# Patient Record
Sex: FEMALE | Race: WHITE | NOT HISPANIC OR LATINO | Employment: OTHER | URBAN - NONMETROPOLITAN AREA
[De-identification: names, ages, dates, MRNs, and addresses within clinical notes are randomized per-mention and may not be internally consistent; named-entity substitution may affect disease eponyms.]

---

## 2017-01-03 ENCOUNTER — TRANSCRIBE ORDERS (OUTPATIENT)
Dept: ADMINISTRATIVE | Facility: HOSPITAL | Age: 67
End: 2017-01-03

## 2017-01-03 DIAGNOSIS — Z12.31 SCREENING MAMMOGRAM, ENCOUNTER FOR: Primary | ICD-10-CM

## 2017-01-03 DIAGNOSIS — Z12.39 ENCOUNTER FOR SCREENING FOR MALIGNANT NEOPLASM OF BREAST: Primary | ICD-10-CM

## 2017-01-04 ENCOUNTER — HOSPITAL ENCOUNTER (OUTPATIENT)
Dept: MAMMOGRAPHY | Facility: HOSPITAL | Age: 67
Discharge: HOME | End: 2017-01-04
Admitting: NURSE PRACTITIONER

## 2017-01-04 DIAGNOSIS — Z12.39 ENCOUNTER FOR SCREENING FOR MALIGNANT NEOPLASM OF BREAST: ICD-10-CM

## 2017-01-04 PROCEDURE — G0202 SCR MAMMO BI INCL CAD: HCPCS

## 2017-01-04 PROCEDURE — 77063 BREAST TOMOSYNTHESIS BI: CPT

## 2017-01-10 ENCOUNTER — TRANSCRIBE ORDERS (OUTPATIENT)
Dept: ADMINISTRATIVE | Facility: HOSPITAL | Age: 67
End: 2017-01-10

## 2017-01-10 DIAGNOSIS — N64.89 BREAST ASYMMETRY: Primary | ICD-10-CM

## 2017-01-12 ENCOUNTER — HOSPITAL ENCOUNTER (OUTPATIENT)
Dept: MAMMOGRAPHY | Facility: HOSPITAL | Age: 67
Discharge: HOME | End: 2017-01-12
Admitting: NURSE PRACTITIONER

## 2017-01-12 ENCOUNTER — HOSPITAL ENCOUNTER (OUTPATIENT)
Dept: ULTRASOUND IMAGING | Facility: HOSPITAL | Age: 67
Discharge: HOME | End: 2017-01-12

## 2017-01-12 DIAGNOSIS — N64.89 BREAST ASYMMETRY: ICD-10-CM

## 2017-01-12 DIAGNOSIS — R92.2 BREAST DENSITY: Primary | ICD-10-CM

## 2017-01-12 PROCEDURE — 76642 ULTRASOUND BREAST LIMITED: CPT

## 2017-01-12 PROCEDURE — G0204 DX MAMMO INCL CAD BI: HCPCS

## 2017-01-12 PROCEDURE — G0279 TOMOSYNTHESIS, MAMMO: HCPCS

## 2017-06-07 ENCOUNTER — OFFICE VISIT (OUTPATIENT)
Dept: CARDIOLOGY | Age: 67
End: 2017-06-07
Payer: COMMERCIAL

## 2017-06-07 VITALS
SYSTOLIC BLOOD PRESSURE: 130 MMHG | HEIGHT: 62 IN | BODY MASS INDEX: 25.1 KG/M2 | DIASTOLIC BLOOD PRESSURE: 80 MMHG | WEIGHT: 136.4 LBS | HEART RATE: 77 BPM

## 2017-06-07 DIAGNOSIS — I10 ESSENTIAL HYPERTENSION: Primary | ICD-10-CM

## 2017-06-07 PROCEDURE — 99204 OFFICE O/P NEW MOD 45 MIN: CPT | Performed by: INTERNAL MEDICINE

## 2017-06-07 PROCEDURE — 93000 ELECTROCARDIOGRAM COMPLETE: CPT | Performed by: INTERNAL MEDICINE

## 2017-06-07 ASSESSMENT — ENCOUNTER SYMPTOMS: SHORTNESS OF BREATH: 0

## 2017-07-17 ENCOUNTER — TRANSCRIBE ORDERS (OUTPATIENT)
Dept: PHYSICAL THERAPY | Facility: HOSPITAL | Age: 67
End: 2017-07-17

## 2017-07-17 ENCOUNTER — HOSPITAL ENCOUNTER (OUTPATIENT)
Dept: PHYSICAL THERAPY | Facility: HOSPITAL | Age: 67
Setting detail: THERAPIES SERIES
Discharge: HOME | End: 2017-07-17

## 2017-07-17 DIAGNOSIS — Z96.651 STATUS POST TOTAL RIGHT KNEE REPLACEMENT: Primary | ICD-10-CM

## 2017-07-17 PROCEDURE — 97161 PT EVAL LOW COMPLEX 20 MIN: CPT

## 2017-07-17 NOTE — THERAPY EVALUATION
Outpatient Physical Therapy Ortho Initial Evaluation   [facility]     Patient Name: Gayatri Bianchi  : 1950  MRN: [id number]  Today's Date: 2017      Visit Date: 2017    There is no problem list on file for this patient.       History reviewed. No pertinent past medical history.     Past Surgical History:   Procedure Laterality Date   • BREAST BIOPSY     • HYSTERECTOMY     • OOPHORECTOMY Right        Visit Dx:     ICD-10-CM ICD-9-CM   1. Status post total right knee replacement Z96.651 V43.65             Patient History       17 0900          History    Chief Complaint Pain;Muscle weakness  -RS      Type of Pain Knee pain  -RS      Date Current Problem(s) Began 17  -RS      Brief Description of Current Complaint Patient had a (R) TKA on the date listed above.  She was admitted for one night at the hospital.  Patient is currently using the axillary crutches and negotiating stairs with no difficulties.  Minor drainge was noted from the wound two days ago but this has stopped.  She is changing her own dressings and working on the prescribed HEP.  No falls since surgery.  -RS      Previous treatment for THIS PROBLEM Surgery;Medication  -RS      Onset Date- PT 2017  -RS      Surgery Date: 17  -RS      Patient/Caregiver Goals Relieve pain;Return to prior level of function;Improve mobility;Improve strength  -RS      Current Tobacco Use no  -RS      Smoking Status never smoker  -RS      Patient's Rating of General Health Excellent  -RS      Hand Dominance right-handed  -RS      Occupation/sports/leisure activities retired physical therapist  -RS      Related/Recent Hospitalizations Yes  -RS      Date of Hospitalization 17  -RS      Surgery/Hospitalization (R) TKA  -RS      Are you or can you be pregnant No  -RS      Location (Hospital Name) [facility]  -RS      Pain     Pain Location Knee  -RS      Pain at Present 4  -RS      Pain at Best 0  -RS      Pain at Worst 10  -RS       Pain Frequency Constant/continuous  -RS      Pain Description Aching;Sharp;Tightness;Tender;Throbbing;Sore  -RS      What Performance Factors Make the Current Problem(s) WORSE? active SLR; twisting; bending  -RS      What Performance Factors Make the Current Problem(s) BETTER? propping knee with ice  -RS      Pain Comments 10/10 pain with straightening the knee (working on extension)  -RS      Fall Risk Assessment    Any falls in the past year: No  -RS      Services    Prior Rehab/Home Health Experiences Yes  -RS      Do you plan to receive Home Health services in the near future No  -RS      Daily Activities    Primary Language English  -RS      Are you able to read Yes  -RS      Are you able to write Yes  -RS      How does patient learn best? Listening;Pictures/Video;Demonstration  -RS      Teaching needs identified Home Exercise Program  -RS      Pt Participated in POC and Goals Yes  -RS      Safety    Are you being hurt, hit, or frightened by anyone at home or in your life? No  -RS      Are you being neglected by a caregiver No  -RS        User Key  (r) = Recorded By, (t) = Taken By, (c) = Cosigned By    Initials Name Provider Type    RS Amadou Hunt, PT DPT Physical Therapist                PT Ortho       07/17/17 0930    Posture/Observations    Posture- WNL Posture is WNL  -RS    Observations Incision healing  -RS    Posture/Observations Comments No drainage noted from the incision.  The distal 1/3 of the incision is slightly adhered but I was not aggressive due to recent surgery.  -RS    Sensation    Sensation WNL? WNL  -RS    Light Touch No apparent deficits  -RS    Special Tests/Palpation    Special Tests/Palpation Knee  -RS    Knee Palpation    Medial Joint Line Right:;Tender;Swollen  -RS    Lateral Joint Line Right:;Tender;Swollen;Warm  -RS    Posterior Joint Line Right:;Tender;Swollen  -RS    Quads Right:;Tender;Atrophied  -RS    Medial Gastroc Head Right:;Tender;Swollen;Guarded/taut  -RS     Lateral Gastroc Head Right:;Tender;Guarded/taut;Swollen  -RS    Knee Palpation? Yes  -RS    Patellar Accessory Motions    Patellar Accessory Motions Tested? Yes  -RS    Medial glide Right:;WNL  -RS    Lateral glide Right:;WNL  -RS    Knee (Tibiofemoral) Accessory Motions    Knee (Tibiofemoral) Accessory Motions Tested? --   not assessed due to recent surgery  -RS    ROM (Range of Motion)    General ROM lower extremity range of motion deficits identified  -RS    General LE Assessment    ROM knee, right: LE ROM deficit  -RS    ROM Detail (R) extension lag with SLR:  13 degrees with pain noted  -RS    Right Knee    Extension/Flexion AROM Deficit Extension:  +3 (WNL); Flexion:  65 degrees  -RS    MMT (Manual Muscle Testing)    General MMT Assessment lower extremity strength deficits identified  -RS    Lower Extremity    Lower Ext Manual Muscle Testing right knee strength deficit  -RS    Right Knee    Knee Manual Muscle Testing Detail Quad tone was moderately decreased with quad set.  Formal MMT unable to be assessed due to pain/swelling  -RS    Girth    Girth Measured? Right Lower Extremity;Left Lower Extremity  -RS    RLE Quick Girth (cm)    Mid patella 48.3 cm  -RS    LLE Quick Girth (cm)    Mid patella 34.6 cm  -RS    Pathomechanics    Lower Extremity Pathomechanics Antalgic with midstance;Asymmetrical steps  -RS    Pathomechanics Comments avoidance pattern of weight bearing on the (R) LE with midstance that was present prior to surgery.  -RS    Balance Skills Training    SLS (R) unable without UE support  -RS    Gait Assessment/Treatment    Gait, Assistive Device axillary crutches  -RS    Gait, Gait Pattern Analysis swing-through gait  -RS    Gait, Gait Deviations right:;ataxia;leans to the left;step length decreased;weight-shifting ability decreased  -RS    Gait, Impairments strength decreased;ROM decreased;impaired balance;pain  -RS    Gait, Comment Patient does lose 6 degrees of knee extension with midstance with  a CW pelvic rotation (habitual prior to surgery)  -RS    Stairs Assessment/Treatment    Stairs, Comment not assessed due to patient reports of this not being an issue at home.  Patient can negotitate 14 stairs at home without assistance.     -RS      User Key  (r) = Recorded By, (t) = Taken By, (c) = Cosigned By    Initials Name Provider Type    RS Amadou Hunt, PT DPT Physical Therapist                            Therapy Education       07/17/17 0930          Therapy Education    Given HEP;Symptoms/condition management;Mobility training  -RS      Program Modified   eccentric SLR, isometric SAQ vs. ASLR and SAQ OKC  -RS      How Provided Verbal;Demonstration  -RS      Provided to Patient  -RS      Level of Understanding Demonstrated;Verbalized  -RS        User Key  (r) = Recorded By, (t) = Taken By, (c) = Cosigned By    Initials Name Provider Type    RS Amadou Hunt, PT DPT Physical Therapist                PT OP Goals       07/17/17 0900       PT Short Term Goals    STG Date to Achieve 08/07/17  -RS     STG 1 Patient will improve active (R) knee flexion to 90+ degrees  -RS     STG 1 Progress New  -RS     STG 2 Patient will decrease (R) knee swelling, measured at the mid patella, by 2.0 cm at least  -RS     STG 2 Progress New  -RS     STG 3 Patient will decrease (R) knee extension lag with active SLR to <5 degrees  -RS     STG 3 Progress New  -RS     Long Term Goals    LTG Date to Achieve 08/28/17  -RS     LTG 1 Patient will be independent with a comprehensive HEP by discharge.    -RS     LTG 1 Progress New  -RS     LTG 2 Patient will ambulate with equal weight bearing using no assistive device, demonstrating no compensatory flexion, by discharge.  -RS     LTG 2 Progress New  -RS     LTG 3 Patient will demonstrate (R) active knee flexion to 120 degrees plus by discharge.  -RS     LTG 3 Progress New  -RS     LTG 4 Patient will demonstrate no compensatory weight shift with functional activites such  as sit to stand, stand to sit, squatting, etc by discharge  -RS     LTG 4 Progress New  -RS     LTG 5 Patient will report symptoms <2/10 with ADL's and recreational activities (other than kayaking) including ambulating community distances by discharge.  -RS     LTG 5 Progress New  -RS     Time Calculation    PT Goal Re-Cert Due Date 08/16/17  -RS       User Key  (r) = Recorded By, (t) = Taken By, (c) = Cosigned By    Initials Name Provider Type    RS Amadou Hunt, PT DPT Physical Therapist                PT Assessment/Plan       07/17/17 0930       PT Assessment    Functional Limitations Impaired gait;Performance in self-care ADL;Performance in leisure activities  -RS     Impairments Balance;Gait;Muscle strength;Pain;Range of motion;Edema  -RS     Assessment Comments Patient presents s/p (R) TKA on 07/13/2017.  Overall she is doing very well at this time.  The patient is using axillary crutches independently with no buckling of the (R) knee noted during midstance.  The patient has habitually shifted to the left LE prior to surgery that we need to address, including a clockwise pelvic rotation.  Extension ROM is excellent at this time and we will monitor this over time to ensure we don't lose any motion.  Flexion AROM is limited by pain but this is early and this will also improve over time.  Scar mobility will be a focus once the incision is fully healed.  The swelling has reflexively inhibited the quads leading to the extension lag with active SLR.  Patient was given a size F Tg  (with gauze to cover the incision) for the swelling and instructed on activity modifications to avoid excessive swelling.  HEP was modified to avoid painful SLR with an eccentric focus.  Modalities may be used to improve quadriceps control and decrease pain if needed.  I anticipate that the patient will do very well.  Thank you for allowing us to work with this patient.  -RS     Please refer to paper survey for additional  self-reported information Yes  -RS     Rehab Potential Excellent  -RS     Patient/caregiver participated in establishment of treatment plan and goals Yes  -RS     Patient would benefit from skilled therapy intervention Yes  -RS     PT Plan    PT Frequency 3x/week  -RS     Predicted Duration of Therapy Intervention (days/wks) 4-6 weeks  -RS     Planned CPT's? PT EVAL LOW COMPLEXITY: 76835;PT THER PROC EA 15 MIN: 85079;PT MANUAL THERAPY EA 15 MIN: 43670;PT NEUROMUSC RE-EDUCATION EA 15 MIN: 67658;PT GAIT TRAINING EA 15 MIN: 99417;PT HOT OR COLD PACK TREAT MCARE;PT ELECTRICAL STIM UNATTEND: ;PT ELECTRICAL STIM ATTD EA 15 MIN: 62130  -RS     Physical Therapy Interventions (Optional Details) balance training;gait training;home exercise program;joint mobilization;manual therapy techniques;modalities;neuromuscular re-education;patient/family education;ROM (Range of Motion);strengthening;stretching;swiss ball techniques;taping;transfer training  -RS     PT Plan Comments We will begin with focusing on decreasing the swelling, gradually restoring flexion ROM, and improving volitonal quadriceps control.  The muscular control will improve as the swelling/effusion goes down.  The patient will be progressed on a comprehensive HEP.  -RS       User Key  (r) = Recorded By, (t) = Taken By, (c) = Cosigned By    Initials Name Provider Type    RS Amadou Hunt, PT DPT Physical Therapist                                    Time Calculation:   Start Time: 0920  Stop Time: 1020  Time Calculation (min): 60 min     Therapy Charges for Today     Code Description Service Date Service Provider Modifiers Qty    [id number]  PT EVAL LOW COMPLEXITY 4 7/17/2017 Amadou Hunt, PT DPT GP 1                    KIM Hunt, PT DPT  7/17/2017

## 2017-07-18 ENCOUNTER — HOSPITAL ENCOUNTER (OUTPATIENT)
Dept: ULTRASOUND IMAGING | Facility: HOSPITAL | Age: 67
Discharge: HOME | End: 2017-07-18
Admitting: ORTHOPAEDIC SURGERY

## 2017-07-18 ENCOUNTER — TRANSCRIBE ORDERS (OUTPATIENT)
Dept: ADMINISTRATIVE | Facility: HOSPITAL | Age: 67
End: 2017-07-18

## 2017-07-18 DIAGNOSIS — M79.89 LEG SWELLING: ICD-10-CM

## 2017-07-18 DIAGNOSIS — M79.89 LEG SWELLING: Primary | ICD-10-CM

## 2017-07-18 PROCEDURE — 93971 EXTREMITY STUDY: CPT

## 2017-07-19 ENCOUNTER — HOSPITAL ENCOUNTER (OUTPATIENT)
Dept: PHYSICAL THERAPY | Facility: HOSPITAL | Age: 67
Setting detail: THERAPIES SERIES
Discharge: HOME | End: 2017-07-19

## 2017-07-19 DIAGNOSIS — Z96.651 STATUS POST TOTAL RIGHT KNEE REPLACEMENT: Primary | ICD-10-CM

## 2017-07-19 PROCEDURE — 97110 THERAPEUTIC EXERCISES: CPT

## 2017-07-19 NOTE — THERAPY TREATMENT NOTE
Outpatient Physical Therapy Ortho Treatment Note  [facility]     Patient Name: Gayatri Bianchi  : 1950  MRN: [id number]  Today's Date: 2017      Visit Date: 2017    Visit Dx:    ICD-10-CM ICD-9-CM   1. Status post total right knee replacement Z96.651 V43.65       There is no problem list on file for this patient.       No past medical history on file.     Past Surgical History:   Procedure Laterality Date   • BREAST BIOPSY     • HYSTERECTOMY     • OOPHORECTOMY Right              PT Ortho       17 0930    Posture/Observations    Posture- WNL Posture is WNL  -RS    Observations Incision healing  -RS    Posture/Observations Comments No drainage noted from the incision.  The distal 1/3 of the incision is slightly adhered but I was not aggressive due to recent surgery.  -RS    Sensation    Sensation WNL? WNL  -RS    Light Touch No apparent deficits  -RS    Special Tests/Palpation    Special Tests/Palpation Knee  -RS    Knee Palpation    Medial Joint Line Right:;Tender;Swollen  -RS    Lateral Joint Line Right:;Tender;Swollen;Warm  -RS    Posterior Joint Line Right:;Tender;Swollen  -RS    Quads Right:;Tender;Atrophied  -RS    Medial Gastroc Head Right:;Tender;Swollen;Guarded/taut  -RS    Lateral Gastroc Head Right:;Tender;Guarded/taut;Swollen  -RS    Knee Palpation? Yes  -RS    Patellar Accessory Motions    Patellar Accessory Motions Tested? Yes  -RS    Medial glide Right:;WNL  -RS    Lateral glide Right:;WNL  -RS    Knee (Tibiofemoral) Accessory Motions    Knee (Tibiofemoral) Accessory Motions Tested? --   not assessed due to recent surgery  -RS    ROM (Range of Motion)    General ROM lower extremity range of motion deficits identified  -RS    General LE Assessment    ROM knee, right: LE ROM deficit  -RS    ROM Detail (R) extension lag with SLR:  13 degrees with pain noted  -RS    Right Knee    Extension/Flexion AROM Deficit Extension:  +3 (WNL); Flexion:  65 degrees  -RS    MMT (Manual Muscle  Testing)    General MMT Assessment lower extremity strength deficits identified  -RS    Lower Extremity    Lower Ext Manual Muscle Testing right knee strength deficit  -RS    Right Knee    Knee Manual Muscle Testing Detail Quad tone was moderately decreased with quad set.  Formal MMT unable to be assessed due to pain/swelling  -RS    Girth    Girth Measured? Right Lower Extremity;Left Lower Extremity  -RS    RLE Quick Girth (cm)    Mid patella 48.3 cm  -RS    LLE Quick Girth (cm)    Mid patella 34.6 cm  -RS    Pathomechanics    Lower Extremity Pathomechanics Antalgic with midstance;Asymmetrical steps  -RS    Pathomechanics Comments avoidance pattern of weight bearing on the (R) LE with midstance that was present prior to surgery.  -RS    Balance Skills Training    SLS (R) unable without UE support  -RS    Gait Assessment/Treatment    Gait, Assistive Device axillary crutches  -RS    Gait, Gait Pattern Analysis swing-through gait  -RS    Gait, Gait Deviations right:;ataxia;leans to the left;step length decreased;weight-shifting ability decreased  -RS    Gait, Impairments strength decreased;ROM decreased;impaired balance;pain  -RS    Gait, Comment Patient does lose 6 degrees of knee extension with midstance with a CW pelvic rotation (habitual prior to surgery)  -RS    Stairs Assessment/Treatment    Stairs, Comment not assessed due to patient reports of this not being an issue at home.  Patient can negotitate 14 stairs at home without assistance.     -RS      User Key  (r) = Recorded By, (t) = Taken By, (c) = Cosigned By    Initials Name Provider Type    RS Amadou Hunt, PT DPT Physical Therapist                            PT Assessment/Plan       07/19/17 1000       PT Assessment    Assessment Comments Today the patient was much more swollen and sore today.  The patient did have a DVT ruled out yesterday.  The flexion was improved and was not forced today due to symptoms and increased swelling.    -RS     PT  Plan    PT Plan Comments progress the HEP as able; continue to improve swelling and ROM.  -RS       User Key  (r) = Recorded By, (t) = Taken By, (c) = Cosigned By    Initials Name Provider Type    RS Amadou Hunt, PT DPT Physical Therapist                    Exercises       07/19/17 0800          Subjective Pain    Able to rate subjective pain? yes  -RS      Pre-Treatment Pain Level 3  -RS      Post-Treatment Pain Level 4  -RS      Subjective Pain Comment 8-9/10 with motion  -RS      Exercise 1    Exercise Name 1 assessed ROM:  3-0-66 to begin  -RS      Exercise 2    Exercise Name 2 eccentric place and hold SLR  -RS      Reps 2 15  -RS      Exercise 3    Exercise Name 3 supine:  supported AAROM knee flexion   55 cm physioball  -RS      Reps 3 30  -RS      Exercise 4    Exercise Name 4 supine:  therapist PROM flexion to tolerance with LLLD hold  -RS      Time (Minutes) 4 8 total  -RS      Exercise 5    Exercise Name 5 supine heel prop extension with therapist providing calf raise.  -RS      Time (Minutes) 5 4  -RS      Exercise 6    Exercise Name 6 light patellofemoral glide grade 1/2 M-L and superior - inferior  -RS        User Key  (r) = Recorded By, (t) = Taken By, (c) = Cosigned By    Initials Name Provider Type    EFRAIN Hunt, PT DPT Physical Therapist                               PT OP Goals       07/19/17 1000       PT Short Term Goals    STG Date to Achieve 08/07/17  -RS     STG 1 Patient will improve active (R) knee flexion to 90+ degrees  -RS     STG 1 Progress Ongoing  -RS     STG 1 Progress Comments AAROM 68 deg  -RS     STG 2 Patient will decrease (R) knee swelling, measured at the mid patella, by 2.0 cm at least  -RS     STG 2 Progress Ongoing  -RS     STG 3 Patient will decrease (R) knee extension lag with active SLR to <5 degrees  -RS     STG 3 Progress Ongoing  -RS     Long Term Goals    LTG Date to Achieve 08/28/17  -RS     LTG 1 Patient will be independent with a  comprehensive HEP by discharge.    -RS     LTG 1 Progress Ongoing  -RS     LTG 2 Patient will ambulate with equal weight bearing using no assistive device, demonstrating no compensatory flexion, by discharge.  -RS     LTG 2 Progress Ongoing  -RS     LTG 3 Patient will demonstrate (R) active knee flexion to 120 degrees plus by discharge.  -RS     LTG 3 Progress Ongoing  -RS     LTG 4 Patient will demonstrate no compensatory weight shift with functional activites such as sit to stand, stand to sit, squatting, etc by discharge  -RS     LTG 4 Progress Ongoing  -RS     LTG 5 Patient will report symptoms <2/10 with ADL's and recreational activities (other than kayaking) including ambulating community distances by discharge.  -RS     LTG 5 Progress Ongoing  -RS     Time Calculation    PT Goal Re-Cert Due Date 08/16/17  -RS       User Key  (r) = Recorded By, (t) = Taken By, (c) = Cosigned By    Initials Name Provider Type    RS Amadou Hunt, PT DPT Physical Therapist                    Time Calculation:   Start Time: 0845  Stop Time: 0930  Time Calculation (min): 45 min    Therapy Charges for Today     Code Description Service Date Service Provider Modifiers Qty    14305692575 HC PT THER PROC EA 15 MIN 7/19/2017 Amadou Hunt, PT DPT GP 3                    KIM Hunt, PT DPT  7/19/2017

## 2017-07-21 ENCOUNTER — HOSPITAL ENCOUNTER (OUTPATIENT)
Dept: PHYSICAL THERAPY | Facility: HOSPITAL | Age: 67
Setting detail: THERAPIES SERIES
Discharge: HOME | End: 2017-07-21

## 2017-07-21 DIAGNOSIS — Z96.651 STATUS POST TOTAL RIGHT KNEE REPLACEMENT: Primary | ICD-10-CM

## 2017-07-21 PROCEDURE — 97140 MANUAL THERAPY 1/> REGIONS: CPT

## 2017-07-21 PROCEDURE — 97110 THERAPEUTIC EXERCISES: CPT

## 2017-07-21 NOTE — THERAPY TREATMENT NOTE
Outpatient Physical Therapy Ortho Treatment Note   [facility]     Patient Name: Gayatri Bianchi  : 1950  MRN: [id number]  Today's Date: 2017      Visit Date: 2017    Visit Dx:    ICD-10-CM ICD-9-CM   1. Status post total right knee replacement Z96.651 V43.65       There is no problem list on file for this patient.       No past medical history on file.     Past Surgical History:   Procedure Laterality Date   • BREAST BIOPSY     • HYSTERECTOMY     • OOPHORECTOMY Right                              PT Assessment/Plan       17 1000       PT Assessment    Assessment Comments The global swelling has improved with the calf mobility overall drastically improved.  At this point the total flexion ROM is where we should be at this time.  Quadriceps volitional control is limited by the reflexive inhibition due to the swelling.    -RS     PT Plan    PT Plan Comments continue to gradually improve ROM as symptoms allow; scar mobility.  Improvoe quadriceps recruitment.  -RS       User Key  (r) = Recorded By, (t) = Taken By, (c) = Cosigned By    Initials Name Provider Type    RS Amadou Hunt, PT DPT Physical Therapist                    Exercises       17 1000          Subjective Comments    Subjective Comments Patient has been working on decreasing the swelling with the LE elevated.  She has also been working on knee bending.  -RS      Subjective Pain    Able to rate subjective pain? yes  -RS      Pre-Treatment Pain Level 4  -RS      Post-Treatment Pain Level 4  -RS      Exercise 1    Exercise Name 1 assessed flexion ROM:  70 prior to stretching; 78 after stretching  -RS      Exercise 2    Exercise Name 2 eccentric place and hold SLR using 45 cm physioball  -RS      Reps 2 15  -RS      Exercise 3    Exercise Name 3 supine:  supported AAROM knee flexion   55 cm physioball  -RS      Reps 3 30  -RS      Exercise 4    Exercise Name 4 supine:  therapist PROM flexion to tolerance with LLLD hold   -RS      Time (Minutes) 4 8 total  -RS      Exercise 5    Exercise Name 5 supine heel prop extension with therapist providing calf raise.  -RS      Time (Minutes) 5 4  -RS      Exercise 6    Exercise Name 6 cold laser sweep mode for inflammation without contact of the sound head  -RS      Time (Minutes) 6 5  -RS        User Key  (r) = Recorded By, (t) = Taken By, (c) = Cosigned By    Initials Name Provider Type    RS Amadou Hunt, PT DPT Physical Therapist                        Manual Rx (last 36 hours)      Manual Treatments       07/21/17 1000          Manual Rx 1    Manual Rx 1 Location scar mobility focusing on distal segments  -RS      Manual Rx 1 Type min OP  -RS      Manual Rx 1 Duration 10  -RS      Manual Rx 2    Manual Rx 2 Location (R) patella  -RS      Manual Rx 2 Type medial and lateral oscillation   -RS      Manual Rx 2 Grade 1/2  -RS      Manual Rx 2 Duration 5  -RS        User Key  (r) = Recorded By, (t) = Taken By, (c) = Cosigned By    Initials Name Provider Type    RS Amadou Hunt, PT DPT Physical Therapist                PT OP Goals       07/21/17 0900       PT Short Term Goals    STG Date to Achieve 08/07/17  -RS     STG 1 Patient will improve active (R) knee flexion to 90+ degrees  -RS     STG 1 Progress Ongoing  -RS     STG 1 Progress Comments 78 deg  -RS     STG 2 Patient will decrease (R) knee swelling, measured at the mid patella, by 2.0 cm at least  -RS     STG 2 Progress Ongoing  -RS     STG 3 Patient will decrease (R) knee extension lag with active SLR to <5 degrees  -RS     STG 3 Progress Ongoing  -RS     Long Term Goals    LTG Date to Achieve 08/28/17  -RS     LTG 1 Patient will be independent with a comprehensive HEP by discharge.    -RS     LTG 1 Progress Progressing  -RS     LTG 2 Patient will ambulate with equal weight bearing using no assistive device, demonstrating no compensatory flexion, by discharge.  -RS     LTG 2 Progress Ongoing  -RS     LTG 3 Patient will  demonstrate (R) active knee flexion to 120 degrees plus by discharge.  -RS     LTG 3 Progress Ongoing  -RS     LTG 3 Progress Comments supported AAROM 78;   -RS     LTG 4 Patient will demonstrate no compensatory weight shift with functional activites such as sit to stand, stand to sit, squatting, etc by discharge  -RS     LTG 4 Progress Ongoing  -RS     LTG 5 Patient will report symptoms <2/10 with ADL's and recreational activities (other than kayaking) including ambulating community distances by discharge.  -RS     LTG 5 Progress Ongoing  -RS     Time Calculation    PT Goal Re-Cert Due Date 08/16/17  -RS       User Key  (r) = Recorded By, (t) = Taken By, (c) = Cosigned By    Initials Name Provider Type    EFRAIN Hunt, PT DPT Physical Therapist                Therapy Education       07/21/17 1000          Therapy Education    Given HEP  -RS      Program Reinforced  -RS      How Provided Verbal  -RS      Provided to Patient  -RS      Level of Understanding Verbalized  -RS        User Key  (r) = Recorded By, (t) = Taken By, (c) = Cosigned By    Initials Name Provider Type    EFRAIN Hunt, PT DPT Physical Therapist                Time Calculation:   Start Time: 0950  Stop Time: 1030  Time Calculation (min): 40 min  Total Timed Code Minutes- PT: 40 minute(s)    Therapy Charges for Today     Code Description Service Date Service Provider Modifiers Qty    29011913609 HC PT THER PROC EA 15 MIN 7/21/2017 Amadou Hunt, PT DPT GP 2    98119300465 HC PT MANUAL THERAPY EA 15 MIN 7/21/2017 Amadou Hunt, PT DPT GP 1                    KIM Hunt PT DPT  7/21/2017

## 2017-07-24 ENCOUNTER — HOSPITAL ENCOUNTER (OUTPATIENT)
Dept: PHYSICAL THERAPY | Facility: HOSPITAL | Age: 67
Setting detail: THERAPIES SERIES
Discharge: HOME | End: 2017-07-24

## 2017-07-24 DIAGNOSIS — Z96.651 STATUS POST TOTAL RIGHT KNEE REPLACEMENT: Primary | ICD-10-CM

## 2017-07-24 PROCEDURE — 97032 APPL MODALITY 1+ESTIM EA 15: CPT

## 2017-07-24 PROCEDURE — 97110 THERAPEUTIC EXERCISES: CPT

## 2017-07-24 PROCEDURE — 97140 MANUAL THERAPY 1/> REGIONS: CPT

## 2017-07-24 NOTE — THERAPY TREATMENT NOTE
Outpatient Physical Therapy Ortho Treatment Note   [place]     Patient Name: Gayatri Bianchi  : 1950  MRN: [id number]  Today's Date: 2017      Visit Date: 2017    Visit Dx:    ICD-10-CM ICD-9-CM   1. Status post total right knee replacement Z96.651 V43.65       There is no problem list on file for this patient.       No past medical history on file.     Past Surgical History:   Procedure Laterality Date   • BREAST BIOPSY     • HYSTERECTOMY     • OOPHORECTOMY Right                              PT Assessment/Plan       17 1150       PT Assessment    Assessment Comments I encouraged her to use one crutch for ambulation more often and consider using a cane in the near future due to with both crutches her gait pattern is more poor and she has a tendancy to keep the R LE rigid. She is very motivated and compliant, but we will need to be diligent in assuring that she is over doing it. I assessed her R quad lag today and it had improved to 11 degrees.  -EC     PT Plan    PT Plan Comments Consider brief gait training with SC, assess the effects of the K.T. application, and consider Estim to the quads end of the week or first of next week.  -EC       User Key  (r) = Recorded By, (t) = Taken By, (c) = Cosigned By    Initials Name Provider Type    EC Germán Pérez PTA Physical Therapy Assistant                Modalities       17 1100          ELECTRICAL STIMULATION    Attended/Unattended Attended  -EC      Stimulation Type --   Chronic Inflammation Mode  -EC      Location/Electrode Placement/Other R knee globally  -EC      Rx Minutes 10 mins  -EC        User Key  (r) = Recorded By, (t) = Taken By, (c) = Cosigned By    Initials Name Provider Type    EC Germán Pérez PTA Physical Therapy Assistant                Exercises       17 1000          Subjective Comments    Subjective Comments Pt reports continued swelling and R knee pain as well as soreness. States R lateral distal  ankle is sore  -EC      Subjective Pain    Able to rate subjective pain? yes  -EC      Pre-Treatment Pain Level 4  -EC      Post-Treatment Pain Level 5  -EC      Exercise 1    Exercise Name 1 heel slides and TKE stretches on 45 cm swiss ball  -EC      Time (Minutes) 1 5  -EC      Exercise 2    Exercise Name 2 Sure prep, K.T. application R anterior and anterior/lateral thigh for Lymphatic drainage  -EC        User Key  (r) = Recorded By, (t) = Taken By, (c) = Cosigned By    Initials Name Provider Type    EC Germán Pérez PTA Physical Therapy Assistant                        Manual Rx (last 36 hours)      Manual Treatments       07/24/17 1100          Manual Rx 1    Manual Rx 1 Location R hamstrings and gastroc  -EC      Manual Rx 1 Type IASTM with EDGE tool  -EC      Manual Rx 1 Duration 15   10 min hamstrings/ 5 gastroc due to swelling  -EC      Manual Rx 2    Manual Rx 2 Location (R) patella  -EC      Manual Rx 2 Type medial and lateral oscillation   -EC      Manual Rx 2 Grade 1/2  -EC      Manual Rx 2 Duration 5  -EC        User Key  (r) = Recorded By, (t) = Taken By, (c) = Cosigned By    Initials Name Provider Type    EC Germán Pérez PTA Physical Therapy Assistant                PT OP Goals       07/24/17 1028       PT Short Term Goals    STG Date to Achieve 08/07/17  -EC     STG 1 Patient will improve active (R) knee flexion to 90+ degrees  -EC     STG 1 Progress Ongoing  -EC     STG 1 Progress Comments 87 degrees today  -EC     STG 2 Patient will decrease (R) knee swelling, measured at the mid patella, by 2.0 cm at least  -EC     STG 2 Progress Ongoing  -EC     STG 2 Progress Comments L LE 32.4 cm R LE 37 cm  -EC     STG 3 Patient will decrease (R) knee extension lag with active SLR to <5 degrees  -EC     STG 3 Progress Ongoing  -EC     Long Term Goals    LTG Date to Achieve 08/28/17  -EC     LTG 1 Patient will be independent with a comprehensive HEP by discharge.    -EC     LTG 1 Progress Progressing   -EC     LTG 2 Patient will ambulate with equal weight bearing using no assistive device, demonstrating no compensatory flexion, by discharge.  -EC     LTG 2 Progress Ongoing  -EC     LTG 3 Patient will demonstrate (R) active knee flexion to 120 degrees plus by discharge.  -EC     LTG 3 Progress Ongoing  -EC     LTG 4 Patient will demonstrate no compensatory weight shift with functional activites such as sit to stand, stand to sit, squatting, etc by discharge  -EC     LTG 4 Progress Ongoing  -EC     LTG 5 Patient will report symptoms <2/10 with ADL's and recreational activities (other than kayaking) including ambulating community distances by discharge.  -EC     LTG 5 Progress Ongoing  -EC     Time Calculation    PT Goal Re-Cert Due Date 08/16/17  -EC       User Key  (r) = Recorded By, (t) = Taken By, (c) = Cosigned By    Initials Name Provider Type    EC Gerámn Pérez PTA Physical Therapy Assistant                Therapy Education       07/24/17 1150          Therapy Education    Given Symptoms/condition management;Edema management  -EC      How Provided Verbal  -EC      Provided to Patient  -EC      Level of Understanding Verbalized  -EC        User Key  (r) = Recorded By, (t) = Taken By, (c) = Cosigned By    Initials Name Provider Type    WILVER Pérez PTA Physical Therapy Assistant                Time Calculation:   Start Time: 1030  Stop Time: 1120  Time Calculation (min): 50 min  Total Timed Code Minutes- PT: 50 minute(s)    Therapy Charges for Today     Code Description Service Date Service Provider Modifiers Qty    74307991512 HC PT THER PROC EA 15 MIN 7/24/2017 Germán Pérez, RICCI GP 1    50021915836 HC PT ELEC STIM EA-PER 15 MIN 7/24/2017 Germán Pérez PTA GP 1    57807400394 HC PT MANUAL THERAPY EA 15 MIN 7/24/2017 Germán Pérez PTA GP 1                    Germán Pérez PTA  7/24/2017

## 2017-07-26 ENCOUNTER — HOSPITAL ENCOUNTER (OUTPATIENT)
Dept: PHYSICAL THERAPY | Facility: HOSPITAL | Age: 67
Setting detail: THERAPIES SERIES
Discharge: HOME | End: 2017-07-26

## 2017-07-26 DIAGNOSIS — Z96.651 STATUS POST TOTAL RIGHT KNEE REPLACEMENT: Primary | ICD-10-CM

## 2017-07-26 PROCEDURE — 97140 MANUAL THERAPY 1/> REGIONS: CPT

## 2017-07-26 PROCEDURE — 97032 APPL MODALITY 1+ESTIM EA 15: CPT

## 2017-07-26 PROCEDURE — 97110 THERAPEUTIC EXERCISES: CPT

## 2017-07-26 NOTE — THERAPY TREATMENT NOTE
Outpatient Physical Therapy Ortho Treatment Note   [place]     Patient Name: Gayatri Bianchi  : 1950  MRN: [id number]  Today's Date: 2017      Visit Date: 2017    Visit Dx:    ICD-10-CM ICD-9-CM   1. Status post total right knee replacement Z96.651 V43.65       There is no problem list on file for this patient.       No past medical history on file.     Past Surgical History:   Procedure Laterality Date   • BREAST BIOPSY     • HYSTERECTOMY     • OOPHORECTOMY Right                              PT Assessment/Plan       17 1245       PT Assessment    Assessment Comments She is utilizing a single cruthc for a majority of her ambulation and it seems to have improved her pattern by not causing her to be forward flexed; however, she tends to WB less through her R LE. As her swelling decreases and pain remains low we will address that slowly.  -EC     PT Plan    PT Plan Comments Attempt to have her consider gait training with SC if not spend some time on equal WS with gait with single crutch.  -EC       User Key  (r) = Recorded By, (t) = Taken By, (c) = Cosigned By    Initials Name Provider Type    EC Germán Pérez PTA Physical Therapy Assistant                Modalities       17 1100          ELECTRICAL STIMULATION    Attended/Unattended Attended  -EC      Stimulation Type --   Chronic Inflammation Mode  -EC      Location/Electrode Placement/Other R knee globally  -EC      Rx Minutes 10 mins  -EC        User Key  (r) = Recorded By, (t) = Taken By, (c) = Cosigned By    Initials Name Provider Type    EC Germán Pérez PTA Physical Therapy Assistant                Exercises       17 1200 17 1100       Subjective Comments    Subjective Comments  Pt reports tape on one side of her leg came loose when getting dressed  -EC     Subjective Pain    Able to rate subjective pain?  yes  -EC     Pre-Treatment Pain Level  3  -EC     Exercise 1    Exercise Name 1  R passive TKE  stretches within tolerance  -EC     Time (Minutes) 1  2  -EC     Exercise 2    Exercise Name 2 Sure prep, K.T. application R  anterior/lateral thigh for Lymphatic drainage  -EC      Exercise 3    Exercise Name 3 Sure prep and K.T. applied to R lower leg along bruising and swelling of the calf  -EC      Exercise 4    Exercise Name 4 sustained wall ball SLR's with manual perturbations  -EC      Sets 4 3  -EC      Time (Seconds) 4 30  -EC      Additional Comments 5 cm swiss ball  -EC      Exercise 5    Exercise Name 5 assessed B tibial M/L glide and rotation   -EC        User Key  (r) = Recorded By, (t) = Taken By, (c) = Cosigned By    Initials Name Provider Type    EC Germán Pérez PTA Physical Therapy Assistant                        Manual Rx (last 36 hours)      Manual Treatments       07/26/17 1100          Manual Rx 1    Manual Rx 1 Location R hamstrings and gastroc  -EC      Manual Rx 1 Type IASTM with EDGE tool  -EC      Manual Rx 1 Duration 5  -EC        User Key  (r) = Recorded By, (t) = Taken By, (c) = Cosigned By    Initials Name Provider Type    EC Germán Pérez PTA Physical Therapy Assistant                PT OP Goals       07/26/17 1059       PT Short Term Goals    STG Date to Achieve 08/07/17  -EC     STG 1 Patient will improve active (R) knee flexion to 90+ degrees  -EC     STG 1 Progress Ongoing  -EC     STG 2 Patient will decrease (R) knee swelling, measured at the mid patella, by 2.0 cm at least  -EC     STG 2 Progress Ongoing  -EC     STG 2 Progress Comments R LE has decreased to 36.2  -EC     STG 3 Patient will decrease (R) knee extension lag with active SLR to <5 degrees  -EC     STG 3 Progress Ongoing  -EC     Long Term Goals    LTG Date to Achieve 08/28/17  -EC     LTG 1 Patient will be independent with a comprehensive HEP by discharge.    -EC     LTG 1 Progress Progressing  -EC     LTG 2 Patient will ambulate with equal weight bearing using no assistive device, demonstrating no  compensatory flexion, by discharge.  -EC     LTG 2 Progress Ongoing  -EC     LTG 3 Patient will demonstrate (R) active knee flexion to 120 degrees plus by discharge.  -EC     LTG 3 Progress Ongoing  -EC     LTG 4 Patient will demonstrate no compensatory weight shift with functional activites such as sit to stand, stand to sit, squatting, etc by discharge  -EC     LTG 4 Progress Ongoing  -EC     LTG 5 Patient will report symptoms <2/10 with ADL's and recreational activities (other than kayaking) including ambulating community distances by discharge.  -EC     LTG 5 Progress Ongoing  -EC     Time Calculation    PT Goal Re-Cert Due Date 08/16/17  -EC       User Key  (r) = Recorded By, (t) = Taken By, (c) = Cosigned By    Initials Name Provider Type    WILVER Pérez PTA Physical Therapy Assistant                Therapy Education       07/26/17 1244          Therapy Education    Given Symptoms/condition management  -EC      How Provided Verbal  -EC      Provided to Patient  -EC      Level of Understanding Verbalized  -EC        User Key  (r) = Recorded By, (t) = Taken By, (c) = Cosigned By    Initials Name Provider Type    WILVER Pérez PTA Physical Therapy Assistant                Time Calculation:   Start Time: 1100  Stop Time: 1150  Time Calculation (min): 50 min  Total Timed Code Minutes- PT: 50 minute(s)    Therapy Charges for Today     Code Description Service Date Service Provider Modifiers Qty    89116309482 HC PT ELEC STIM EA-PER 15 MIN 7/26/2017 Germán Pérez PTA GP 1    45695249419 HC PT MANUAL THERAPY EA 15 MIN 7/26/2017 Germán Pérez PTA GP 1    55549659067 HC PT THER PROC EA 15 MIN 7/26/2017 Germán Pérez PTA GP 1                    Germán Pérez PTA  7/26/2017

## 2017-07-28 ENCOUNTER — HOSPITAL ENCOUNTER (OUTPATIENT)
Dept: PHYSICAL THERAPY | Facility: HOSPITAL | Age: 67
Setting detail: THERAPIES SERIES
Discharge: HOME | End: 2017-07-28

## 2017-07-28 DIAGNOSIS — Z96.651 STATUS POST TOTAL RIGHT KNEE REPLACEMENT: Primary | ICD-10-CM

## 2017-07-28 PROCEDURE — 97140 MANUAL THERAPY 1/> REGIONS: CPT

## 2017-07-28 PROCEDURE — 97110 THERAPEUTIC EXERCISES: CPT

## 2017-07-28 NOTE — THERAPY TREATMENT NOTE
"    Outpatient Physical Therapy Ortho Treatment Note  [facility]     Patient Name: Gayatri Bianchi  : 1950  MRN: [id number]  Today's Date: 2017      Visit Date: 2017    Visit Dx:    ICD-10-CM ICD-9-CM   1. Status post total right knee replacement Z96.651 V43.65       There is no problem list on file for this patient.       No past medical history on file.     Past Surgical History:   Procedure Laterality Date   • BREAST BIOPSY     • HYSTERECTOMY     • OOPHORECTOMY Right                      Lymphedema       17 0930          Subjective Comments    Subjective Comments Rates pain 3/10 in the knee joint.  She has tightness in the right ankle with movement, she states this is an ache.  No falls.  She is working on her HEP. keeps her leg elevated, and using ice.   -AL      RLE Quick Girth (cm)    Mid patella 36.6 cm  -AL      Manual Lymphatic Drainage    Manual Lymphatic Drainage initial sequence;opened regional lymph nodes;extremity treatment  -AL      Initial Sequence short neck;supraclavicular;shoulder collectors;abdomen;diaphragmatic breathing  -AL      Supraclavicular right;left  -AL      Shoulder Collectors right;left  -AL      Abdomen deep  -AL      Diaphragmatic Breathing x 9 with deep abdominals  -AL      Opened Regional Lymph Nodes axillary;inguinal  -AL      Axillary right;left  -AL      Inguinal left;right  -AL      Extremity Treatment MLD to full limb  -AL      MLD to Full Limb R LE  -AL      Manual Lymphatic Drainage Comments Instructed on self MLD.  Concentrated on right knee and upper leg.  Instructed on self MLD  -AL      Compression/Skin Care    Compression/Skin Care Comments Discussed getting a 15-20 mmHg compression thigh danny, using \"It Stays\"  to help keep the garment in place.  She is to wear the compression when working on her HEP as well.   -AL        User Key  (r) = Recorded By, (t) = Taken By, (c) = Cosigned By    Initials Name Provider Type    ELINOR Blackmon PTA " Physical Therapy Assistant                    PT Assessment/Plan       07/28/17 0930       PT Assessment    Assessment Comments Patient prefers using the crutch,she is supposed to have a cane delivered today and will start using it in the house.  She is much more comfortable by the end of treatment with the cane. She continues to have swelling R LE, especially at the knee as well as the right upper leg.  She is going to start working on self MLD as well as wear a light comrpession stocking.  Patient's ROM is still limited by pain.   -AL     PT Plan    PT Plan Comments Will progress with quad strengthening next treatment, as well as ROM.  Will also see how patient is doing with using the straight cane for gait and check on the swelling in the R LE.   -AL       User Key  (r) = Recorded By, (t) = Taken By, (c) = Cosigned By    Initials Name Provider Type    ELINOR Blackmon PTA Physical Therapy Assistant                    Exercises       07/28/17 0930          Subjective Comments    Subjective Comments Rates pain 3/10 in the knee joint.  She has tightness in the right ankle with movement, she states this is an ache.  No falls.  She is working on her HEP. keeps her leg elevated, and using ice.   -AL      Subjective Pain    Able to rate subjective pain? yes  -AL      Pre-Treatment Pain Level 3  -AL      Post-Treatment Pain Level 5  -AL      Exercise 1    Exercise Name 1 R passive TKE stretches within tolerance  -AL      Time (Minutes) 1 2  -AL      Exercise 2    Exercise Name 2 Sure prep, K.T. application R  anterior/lateral thigh for Lymphatic drainage   Medial thigh with two pieces of Ktape ending at medial knee  -AL      Exercise 3    Exercise Name 3 Sure prep and K.T. applied to R lower leg along bruising and swelling of the calf   Calf Ktape anchor towards medial knee  -AL      Exercise 4    Exercise Name 4 Worked on TKE, patient instructed to work on this at home.  -AL      Cueing 4 Verbal;Tactile  -AL      Sets  4 2  -AL      Reps 4 5  -AL      Exercise 5    Exercise Name 5 weight shifting forward onto R leg with cane in left hand  -AL      Sets 5 2  -AL      Reps 5 5  -AL      Exercise 6    Exercise Name 6 Gait training with a cane  -AL      Cueing 6 Verbal  -AL      Reps 6 4  -AL      Additional Comments 50  -AL      Exercise 7    Exercise Name 7 Backwards walking with a straight cane   Added to HEP  -AL      Sets 7 3  -AL      Additional Comments 50 feet  -AL        User Key  (r) = Recorded By, (t) = Taken By, (c) = Cosigned By    Initials Name Provider Type    ELINOR Blackmon PTA Physical Therapy Assistant                        Manual Rx (last 36 hours)      Manual Treatments       07/28/17 1100          Manual Rx 1    Manual Rx 1 Location Encouraged scar tissue massage, especially at most distal portion of scar.  -AL      Manual Rx 2    Manual Rx 2 Location Sitting knee distraction right knee  -AL      Manual Rx 2 Duration 2  -AL        User Key  (r) = Recorded By, (t) = Taken By, (c) = Cosigned By    Initials Name Provider Type    ELINOR Blackmon PTA Physical Therapy Assistant                PT OP Goals       07/28/17 0930       PT Short Term Goals    STG Date to Achieve 08/07/17  -AL     STG 1 Patient will improve active (R) knee flexion to 90+ degrees  -AL     STG 1 Progress Ongoing  -AL     STG 1 Progress Comments Starts to guard with stretching at 90 degrees flexion  -AL     STG 2 Patient will decrease (R) knee swelling, measured at the mid patella, by 2.0 cm at least  -AL     STG 2 Progress Ongoing  -AL     STG 2 Progress Comments 36.6 cm  -AL     STG 3 Patient will decrease (R) knee extension lag with active SLR to <5 degrees  -AL     STG 3 Progress Ongoing  -AL     STG 3 Progress Comments Extension lag still present  -AL     Long Term Goals    LTG Date to Achieve 08/28/17  -AL     LTG 1 Patient will be independent with a comprehensive HEP by discharge.    -AL     LTG 1 Progress Progressing  -AL     LTG 1  Progress Comments She is working on HEP  -AL     LTG 2 Patient will ambulate with equal weight bearing using no assistive device, demonstrating no compensatory flexion, by discharge.  -AL     LTG 2 Progress Ongoing  -AL     LTG 2 Progress Comments Needs cues to increase weight bearing R LE  -AL     LTG 3 Patient will demonstrate (R) active knee flexion to 120 degrees plus by discharge.  -AL     LTG 3 Progress Ongoing  -AL     LTG 4 Patient will demonstrate no compensatory weight shift with functional activites such as sit to stand, stand to sit, squatting, etc by discharge  -AL     LTG 4 Progress Ongoing  -AL     LTG 4 Progress Comments Continues to compensate, but improves with verbal cues and practice.  -AL     LTG 5 Patient will report symptoms <2/10 with ADL's and recreational activities (other than kayaking) including ambulating community distances by discharge.  -AL     LTG 5 Progress Ongoing  -AL     LTG 5 Progress Comments Pain 3/10 before treatment, 5/10 after treatment.   -AL     Time Calculation    PT Goal Re-Cert Due Date 08/16/17  -AL       User Key  (r) = Recorded By, (t) = Taken By, (c) = Cosigned By    Initials Name Provider Type    ELINOR Blackmon PTA Physical Therapy Assistant                Therapy Education       07/28/17 0930          Therapy Education    Given Edema management;HEP   TKE, ambulate with the cane at home, walking backwards with the cane.  Also instructed on self MLD  -AL      Program New  -AL      How Provided Verbal  -AL      Provided to Patient;Caregiver  -AL      Level of Understanding Teach back education performed;Verbalized;Demonstrated  -AL        User Key  (r) = Recorded By, (t) = Taken By, (c) = Cosigned By    Initials Name Provider Type    ELINOR Blackmon PTA Physical Therapy Assistant                Time Calculation:   Start Time: 0930  Stop Time: 1045  Time Calculation (min): 75 min  Total Timed Code Minutes- PT: 75 minute(s)    Therapy Charges for Today     Code  Description Service Date Service Provider Modifiers Qty    30784243600 HC PT MANUAL THERAPY EA 15 MIN 7/28/2017 Digna Blackmon PTA GP 3    80158539114 HC PT THER PROC EA 15 MIN 7/28/2017 Digna Blackmon PTA GP 2                    Digna Blackmon PTA  7/28/2017

## 2017-07-31 ENCOUNTER — HOSPITAL ENCOUNTER (OUTPATIENT)
Dept: PHYSICAL THERAPY | Facility: HOSPITAL | Age: 67
Setting detail: THERAPIES SERIES
Discharge: HOME | End: 2017-07-31

## 2017-07-31 DIAGNOSIS — Z96.651 STATUS POST TOTAL RIGHT KNEE REPLACEMENT: Primary | ICD-10-CM

## 2017-07-31 PROCEDURE — 97032 APPL MODALITY 1+ESTIM EA 15: CPT

## 2017-07-31 PROCEDURE — 97110 THERAPEUTIC EXERCISES: CPT

## 2017-08-02 ENCOUNTER — HOSPITAL ENCOUNTER (OUTPATIENT)
Dept: PHYSICAL THERAPY | Facility: HOSPITAL | Age: 67
Setting detail: THERAPIES SERIES
Discharge: HOME | End: 2017-08-02

## 2017-08-02 DIAGNOSIS — Z96.651 STATUS POST TOTAL RIGHT KNEE REPLACEMENT: Primary | ICD-10-CM

## 2017-08-02 PROCEDURE — 97032 APPL MODALITY 1+ESTIM EA 15: CPT

## 2017-08-02 PROCEDURE — 97140 MANUAL THERAPY 1/> REGIONS: CPT

## 2017-08-02 PROCEDURE — 97110 THERAPEUTIC EXERCISES: CPT

## 2017-08-02 NOTE — THERAPY TREATMENT NOTE
Outpatient Physical Therapy Ortho Treatment Note   [place]     Patient Name: Gayatri Bianchi  : 1950  MRN: [id number]  Today's Date: 2017      Visit Date: 2017    Visit Dx:    ICD-10-CM ICD-9-CM   1. Status post total right knee replacement Z96.651 V43.65       There is no problem list on file for this patient.       No past medical history on file.     Past Surgical History:   Procedure Laterality Date   • BREAST BIOPSY     • HYSTERECTOMY     • OOPHORECTOMY Right                              PT Assessment/Plan       17 1238       PT Assessment    Assessment Comments I advised her to try using the Tubigrip again and we my need to encourage this again in the future. She had really focused on flexion yesterday and reported after an extended period of time she reached 110 degrees. I advised her make sure to focus on her gait pattern and quad recruitment  equally as well. She will be returning to the referring surgeon on 17 and I  anticipate our progres thus far will be sufficient , but he will order additional sessions. Due to her inccreased pain rating and swelling I opted to focus on STM and mobillizations, but advised her we will return to strengthening activities during her next session and to refrain from her HEP for the rest of the day today  -EC     PT Plan    PT Plan Comments Continue to progress and encourage Tubigrip use, and focus on all aspects of her recovery especialy her gait pattern with increased activity.  -EC       User Key  (r) = Recorded By, (t) = Taken By, (c) = Cosigned By    Initials Name Provider Type    EC Germán Pérez PTA Physical Therapy Assistant                Modalities       17 1100 17 0900       Subjective Pain    Post-Treatment Pain Level  5  -EC     ELECTRICAL STIMULATION    Attended/Unattended Attended  -EC      Stimulation Type --   Chronic Inflammation Mode  -EC      Location/Electrode Placement/Other R knee globally  -EC      Rx  Minutes 10 mins  -EC        User Key  (r) = Recorded By, (t) = Taken By, (c) = Cosigned By    Initials Name Provider Type    WILVER Pérez PTA Physical Therapy Assistant                Exercises       08/02/17 0900          Subjective Comments    Subjective Comments Pt states she ran a lot of errands yesterday, had increased activity and it's more sore today.  -EC      Subjective Pain    Able to rate subjective pain? yes  -EC      Pre-Treatment Pain Level 5  -EC      Post-Treatment Pain Level 5  -EC        User Key  (r) = Recorded By, (t) = Taken By, (c) = Cosigned By    Initials Name Provider Type    WILVER Pérez PTA Physical Therapy Assistant                        Manual Rx (last 36 hours)      Manual Treatments       08/02/17 1100          Manual Rx 1    Manual Rx 1 Location R hamstrings and gastroc  -EC      Manual Rx 1 Type IASTM with EDGE tool  -EC      Manual Rx 1 Duration 15  -EC      Manual Rx 2    Manual Rx 2 Location R ITB  -EC      Manual Rx 2 Type IASTM with EDGE tool  -EC      Manual Rx 2 Duration 10  -EC      Manual Rx 3    Manual Rx 3 Location R patella  -EC      Manual Rx 3 Type medial and superior glides  -EC      Manual Rx 3 Grade 1 and 2  -EC      Manual Rx 3 Duration 5  -EC      Manual Rx 4    Manual Rx 4 Location light scar tissue mobilization distal incision and R patellar tendon  -EC      Manual Rx 4 Duration 3  -EC      Manual Rx 5    Manual Rx 5 Location manual R knee flexion stretches  -EC      Manual Rx 5 Duration 2  -EC        User Key  (r) = Recorded By, (t) = Taken By, (c) = Cosigned By    Initials Name Provider Type    WILVER Pérez PTA Physical Therapy Assistant                PT OP Goals       08/02/17 1100 08/02/17 0934    PT Short Term Goals    STG Date to Achieve 08/07/17  -EC     STG 1 Patient will improve active (R) knee flexion to 90+ degrees  -EC     STG 1 Progress Met  -EC     STG 1 Progress Comments 93 degrees AROM previous session, 98 degrees PROM   -EC     STG 2 Patient will decrease (R) knee swelling, measured at the mid patella, by 2.0 cm at least  -EC     STG 2 Progress Ongoing  -EC     STG 2 Progress Comments 35.9 at the jointline  -EC     STG 3 Patient will decrease (R) knee extension lag with active SLR to <5 degrees  -EC     STG 3 Progress Ongoing  -EC     Long Term Goals    LTG Date to Achieve 08/28/17  -EC     LTG 1 Patient will be independent with a comprehensive HEP by discharge.    -EC     LTG 1 Progress Progressing  -EC     LTG 2 Patient will ambulate with equal weight bearing using no assistive device, demonstrating no compensatory flexion, by discharge.  -EC     LTG 2 Progress Ongoing  -EC     LTG 3 Patient will demonstrate (R) active knee flexion to 120 degrees plus by discharge.  -EC     LTG 3 Progress Ongoing  -EC     LTG 4 Patient will demonstrate no compensatory weight shift with functional activites such as sit to stand, stand to sit, squatting, etc by discharge  -EC     LTG 4 Progress Ongoing  -EC     LTG 5 Patient will report symptoms <2/10 with ADL's and recreational activities (other than kayaking) including ambulating community distances by discharge.  -EC     LTG 5 Progress Ongoing  -EC     Time Calculation    PT Goal Re-Cert Due Date  08/16/17  -EC      User Key  (r) = Recorded By, (t) = Taken By, (c) = Cosigned By    Initials Name Provider Type    WILVER Pérez PTA Physical Therapy Assistant                Therapy Education       08/02/17 1237          Therapy Education    Given Symptoms/condition management;Edema management  -EC      How Provided Verbal  -EC      Provided to Patient  -EC      Level of Understanding Verbalized  -EC        User Key  (r) = Recorded By, (t) = Taken By, (c) = Cosigned By    Initials Name Provider Type    WILVER Pérez PTA Physical Therapy Assistant                Time Calculation:   Start Time: 0932  Stop Time: 1017  Time Calculation (min): 45 min  Total Timed Code Minutes- PT: 45  minute(s)    Therapy Charges for Today     Code Description Service Date Service Provider Modifiers Qty    86909856591 HC PT MANUAL THERAPY EA 15 MIN 8/2/2017 Germán Pérez, RICCI GP 2    48748567414 HC PT ELEC STIM EA-PER 15 MIN 8/2/2017 Germán Pérez PTA GP 1                    Germán Pérez, PTA  8/2/2017

## 2017-08-04 ENCOUNTER — HOSPITAL ENCOUNTER (OUTPATIENT)
Dept: PHYSICAL THERAPY | Facility: HOSPITAL | Age: 67
Setting detail: THERAPIES SERIES
Discharge: HOME | End: 2017-08-04

## 2017-08-04 DIAGNOSIS — Z96.651 STATUS POST TOTAL RIGHT KNEE REPLACEMENT: Primary | ICD-10-CM

## 2017-08-04 PROCEDURE — 97110 THERAPEUTIC EXERCISES: CPT

## 2017-08-04 NOTE — THERAPY TREATMENT NOTE
Outpatient Physical Therapy Ortho Treatment Note  [facility]     Patient Name: Gayatri Bianchi  : 1950  MRN: [id number]  Today's Date: 2017      Visit Date: 2017    Visit Dx:    ICD-10-CM ICD-9-CM   1. Status post total right knee replacement Z96.651 V43.65       There is no problem list on file for this patient.       No past medical history on file.     Past Surgical History:   Procedure Laterality Date   • BREAST BIOPSY     • HYSTERECTOMY     • OOPHORECTOMY Right                              PT Assessment/Plan       17 1000       PT Assessment    Assessment Comments The extension motion has decreased as we have been working on bending this week.  The weight bearing with midstance has improved with the cane with the chronic hip compensation still noted.  This will be an emphasis going forward.  -RS     PT Plan    PT Plan Comments continue with improving extension and extension lag, quad recruitment, and gait mechanics  -RS       User Key  (r) = Recorded By, (t) = Taken By, (c) = Cosigned By    Initials Name Provider Type    RS Amadou Hunt, PT DPT Physical Therapist                    Exercises       17 0800          Subjective Comments    Subjective Comments Patient is doing well and will see the MD next week.  She has been working on her bending at home  -RS      Subjective Pain    Able to rate subjective pain? yes  -RS      Pre-Treatment Pain Level 3  -RS      Post-Treatment Pain Level 4  -RS      Exercise 1    Exercise Name 1 knee flexion/extension:  0-4-101  -RS      Exercise 2    Exercise Name 2 sitting:  LAQ isometric with therapist resistance at 60 deg flexion  -RS      Cueing 2 Verbal;Tactile  -RS      Reps 2 10  -RS      Time (Seconds) 2 20 sec hold  -RS      Exercise 3    Exercise Name 3 sitting:  TKE with NMES superimposed resting foot on stool in between reps  -RS      Cueing 3 Verbal  -RS      Time (Minutes) 3 10  -RS      Additional Comments 10/40 on/off; 12  mA bipolar setup VMO --> rectus femoris; skin check after and no adverse reaction noted  -RS      Exercise 4    Exercise Name 4 mini lunge on curved side of BOSU  -RS      Cueing 4 Demo  -RS      Sets 4 3  -RS      Reps 4 5  -RS      Time (Seconds) 4 10 sec hold  -RS      Exercise 5    Exercise Name 5 step back up/over cone with UE support as needed  -RS      Cueing 5 Demo  -RS      Sets 5 3  -RS      Reps 5 10  -RS      Additional Comments focused on upright trunk  -RS      Exercise 6    Exercise Name 6 sitting flexion with therapist light OP at end range  -RS      Sets 6 1  -RS      Reps 6 20  -RS        User Key  (r) = Recorded By, (t) = Taken By, (c) = Cosigned By    Initials Name Provider Type    RS Amadou Hunt, PT DPT Physical Therapist                               PT OP Goals       08/04/17 0800       PT Short Term Goals    STG Date to Achieve 08/07/17  -RS     STG 1 Patient will improve active (R) knee flexion to 90+ degrees  -RS     STG 1 Progress Met  -RS     STG 2 Patient will decrease (R) knee swelling, measured at the mid patella, by 2.0 cm at least  -RS     STG 2 Progress Ongoing  -RS     STG 3 Patient will decrease (R) knee extension lag with active SLR to <5 degrees  -RS     STG 3 Progress Ongoing  -RS     STG 3 Progress Comments 8 deg today  -RS     Long Term Goals    LTG Date to Achieve 08/28/17  -RS     LTG 1 Patient will be independent with a comprehensive HEP by discharge.    -RS     LTG 1 Progress Progressing  -RS     LTG 2 Patient will ambulate with equal weight bearing using no assistive device, demonstrating no compensatory flexion, by discharge.  -RS     LTG 2 Progress Ongoing  -RS     LTG 3 Patient will demonstrate (R) active knee flexion to 120 degrees plus by discharge.  -RS     LTG 3 Progress Ongoing  -RS     LTG 3 Progress Comments 101 AAROM  -RS     LTG 4 Patient will demonstrate no compensatory weight shift with functional activites such as sit to stand, stand to sit,  squatting, etc by discharge  -RS     LTG 4 Progress Ongoing  -RS     LTG 5 Patient will report symptoms <2/10 with ADL's and recreational activities (other than kayaking) including ambulating community distances by discharge.  -RS     LTG 5 Progress Ongoing  -RS     Time Calculation    PT Goal Re-Cert Due Date 08/16/17  -RS       User Key  (r) = Recorded By, (t) = Taken By, (c) = Cosigned By    Initials Name Provider Type    EFRAIN Hunt, PT DPT Physical Therapist                Therapy Education       08/04/17 0800          Therapy Education    Education Details step back up/over cone with UE support  -RS      Given HEP  -RS      Program New  -RS      How Provided Demonstration  -RS      Provided to Patient  -RS      Level of Understanding Demonstrated  -RS        User Key  (r) = Recorded By, (t) = Taken By, (c) = Cosigned By    Initials Name Provider Type    EFRAIN Hunt, PT DPT Physical Therapist                Time Calculation:   Start Time: 0846  Stop Time: 0932  Time Calculation (min): 46 min  Total Timed Code Minutes- PT: 40 minute(s)    Therapy Charges for Today     Code Description Service Date Service Provider Modifiers Qty    91518968120 HC PT THER PROC EA 15 MIN 8/4/2017 Amadou Hunt, PT DPT GP 3                    KIM Hunt, PT DPT  8/4/2017

## 2017-08-07 ENCOUNTER — HOSPITAL ENCOUNTER (OUTPATIENT)
Dept: PHYSICAL THERAPY | Facility: HOSPITAL | Age: 67
Setting detail: THERAPIES SERIES
Discharge: HOME | End: 2017-08-07

## 2017-08-07 DIAGNOSIS — Z96.651 STATUS POST TOTAL RIGHT KNEE REPLACEMENT: Primary | ICD-10-CM

## 2017-08-07 PROCEDURE — 97110 THERAPEUTIC EXERCISES: CPT

## 2017-08-07 PROCEDURE — 97140 MANUAL THERAPY 1/> REGIONS: CPT

## 2017-08-07 NOTE — THERAPY TREATMENT NOTE
Outpatient Physical Therapy Ortho Treatment Note  [facility]     Patient Name: Gayatri Bianchi  : 1950  MRN: [id number]  Today's Date: 2017      Visit Date: 2017    Visit Dx:    ICD-10-CM ICD-9-CM   1. Status post total right knee replacement Z96.651 V43.65       There is no problem list on file for this patient.       No past medical history on file.     Past Surgical History:   Procedure Laterality Date   • BREAST BIOPSY     • HYSTERECTOMY     • OOPHORECTOMY Right                              PT Assessment/Plan       17 1125       PT Assessment    Assessment Comments Her edema appears to be more controlled and her soft tissue adhesions are reduced significantly. Her quad lag is still evident and we will continue to address this. I continue to suspect her M/L tibia glide is restricted significantly thus changing her mechanics with gait.  -EC     PT Plan    PT Plan Comments Continue quad strengthening and work to improve gait pattern.   -EC       User Key  (r) = Recorded By, (t) = Taken By, (c) = Cosigned By    Initials Name Provider Type    EC Germán Pérez PTA Physical Therapy Assistant                    Exercises       17 0900 17 0800       Subjective Comments    Subjective Comments  Pt reports R sharri pain. She reports she thinks the use of the EDGE tool is very beneficial  -EC     Subjective Pain    Able to rate subjective pain?  yes  -EC     Pre-Treatment Pain Level  3  -EC     Post-Treatment Pain Level  3  -EC     Exercise 1    Exercise Name 1  prone flexion stretches with contract/relax technique  -EC     Reps 1  --  -EC     Time (Seconds) 1  --  -EC     Exercise 2    Exercise Name 2  supine manual TKE stretches  -EC     Reps 2  --  -EC     Time (Seconds) 2  --  -EC     Exercise 3    Exercise Name 3 sitting:  TKE with NMES superimposed resting foot on stool in between reps  -EC      Cueing 3 Verbal  -EC      Time (Minutes) 3 10  -EC        User Key  (r) = Recorded  By, (t) = Taken By, (c) = Cosigned By    Initials Name Provider Type    EC Germán Pérez PTA Physical Therapy Assistant                        Manual Rx (last 36 hours)      Manual Treatments       08/07/17 0900          Manual Rx 1    Manual Rx 1 Location R hamstrings and gastroc  -EC      Manual Rx 1 Type IASTM with EDGE tool  -EC      Manual Rx 1 Duration 10  -EC      Manual Rx 3    Manual Rx 3 Location R patella  -EC      Manual Rx 3 Type medial and superior glides  -EC      Manual Rx 3 Grade 1 and 2  -EC      Manual Rx 3 Duration 5  -EC        User Key  (r) = Recorded By, (t) = Taken By, (c) = Cosigned By    Initials Name Provider Type    EC Germán Pérez PTA Physical Therapy Assistant                PT OP Goals       08/07/17 0843       PT Short Term Goals    STG Date to Achieve 08/07/17  -EC     STG 1 Patient will improve active (R) knee flexion to 90+ degrees  -EC     STG 1 Progress Met  -EC     STG 2 Patient will decrease (R) knee swelling, measured at the mid patella, by 2.0 cm at least  -EC     STG 2 Progress Ongoing  -EC     STG 3 Patient will decrease (R) knee extension lag with active SLR to <5 degrees  -EC     STG 3 Progress Ongoing  -EC     Long Term Goals    LTG Date to Achieve 08/28/17  -EC     LTG 1 Patient will be independent with a comprehensive HEP by discharge.    -EC     LTG 1 Progress Progressing  -EC     LTG 1 Progress Comments she continues to work on extension, and equal WS  -EC     LTG 2 Patient will ambulate with equal weight bearing using no assistive device, demonstrating no compensatory flexion, by discharge.  -EC     LTG 2 Progress Ongoing  -EC     LTG 3 Patient will demonstrate (R) active knee flexion to 120 degrees plus by discharge.  -EC     LTG 3 Progress Ongoing  -EC     LTG 4 Patient will demonstrate no compensatory weight shift with functional activites such as sit to stand, stand to sit, squatting, etc by discharge  -EC     LTG 4 Progress Ongoing  -EC     LTG 5  Patient will report symptoms <2/10 with ADL's and recreational activities (other than kayaking) including ambulating community distances by discharge.  -EC     LTG 5 Progress Ongoing  -EC     Time Calculation    PT Goal Re-Cert Due Date 08/16/17  -EC       User Key  (r) = Recorded By, (t) = Taken By, (c) = Cosigned By    Initials Name Provider Type    EC Germán Pérez PTA Physical Therapy Assistant                Therapy Education       08/07/17 1125          Therapy Education    Given Symptoms/condition management  -EC      How Provided Verbal  -EC      Provided to Patient  -EC      Level of Understanding Verbalized  -EC        User Key  (r) = Recorded By, (t) = Taken By, (c) = Cosigned By    Initials Name Provider Type    EC Germán Pérez PTA Physical Therapy Assistant                Time Calculation:   Start Time: 0847  Stop Time: 0930  Time Calculation (min): 43 min  Total Timed Code Minutes- PT: 43 minute(s)    Therapy Charges for Today     Code Description Service Date Service Provider Modifiers Qty    47411831409 HC PT MANUAL THERAPY EA 15 MIN 8/7/2017 Germán Pérez PTA GP 1    [id number] HC PT THER PROC EA 15 MIN 8/7/2017 Germán Pérez PTA GP 2                    Germán Pérez PTA  8/7/2017

## 2017-08-09 ENCOUNTER — HOSPITAL ENCOUNTER (OUTPATIENT)
Dept: PHYSICAL THERAPY | Facility: HOSPITAL | Age: 67
Setting detail: THERAPIES SERIES
Discharge: HOME | End: 2017-08-09

## 2017-08-09 DIAGNOSIS — Z96.651 STATUS POST TOTAL RIGHT KNEE REPLACEMENT: Primary | ICD-10-CM

## 2017-08-09 PROCEDURE — 97110 THERAPEUTIC EXERCISES: CPT

## 2017-08-09 NOTE — THERAPY TREATMENT NOTE
Outpatient Physical Therapy Ortho Treatment Note  [facility]     Patient Name: Gayatri Bianchi  : 1950  MRN: [id number]  Today's Date: 2017      Visit Date: 2017    Visit Dx:    ICD-10-CM ICD-9-CM   1. Status post total right knee replacement Z96.651 V43.65       There is no problem list on file for this patient.       No past medical history on file.     Past Surgical History:   Procedure Laterality Date   • BREAST BIOPSY     • HYSTERECTOMY     • OOPHORECTOMY Right                              PT Assessment/Plan       17 0800       PT Assessment    Assessment Comments (P)  The patient continues to gain more AROM and PROM of her (R) knee with every visit. She reports some disappointment that she has equal amounts of pain at her end range as she did in the begining but appreciates that she has much more range now. As she has progressed to more weight-bearing activities, her (R) posterior tibialis has caused her some pain, which was relieved with IASTM using the EDGE tool. Her extension lag has significantly improved, although she still does lack some TKE ROM for a proper screw-home mechanism.   -SG     PT Plan    PT Plan Comments (P)  Continue to focus on (R) knee AROM and the progression towards weightbearing strength and mobility exercises. Monitor (R) posterior tibialis pain.  -       User Key  (r) = Recorded By, (t) = Taken By, (c) = Cosigned By    Initials Name Provider Type    SUBHA Dao, PT Student PT Student                    Exercises       17 0800          Subjective Comments    Subjective Comments (P)  Patient reports that her medial (R) calf has been sore while walking and that it is tender; it feels like it is referrering pain to her ankle. She reports walking short distances in her home without her cane.  -SG      Subjective Pain    Able to rate subjective pain? (P)  yes  -SG      Pre-Treatment Pain Level (P)  3  -SG      Post-Treatment Pain Level (P)  7   -SG      Exercise 1    Exercise Name 1 (P)  Assessed (R) knee AROM in supine: -1 deg extension, 104 deg flexion  -SG      Exercise 2    Exercise Name 2 (P)  AAROM with light OP into (R) flexion and extension  -SG      Cueing 2 (P)  Verbal  -SG      Reps 2 (P)  20  -SG      Additional Comments (P)  Patient provided OP while in knee flexion.  -SG      Exercise 3    Exercise Name 3 (P)  Shuttle press with 4 bands  -SG      Cueing 3 (P)  Verbal  -SG      Sets 3 (P)  3  -SG      Reps 3 (P)  10  -SG      Additional Comments (P)  Patient reported that this exercise felt good when she was doing it but after standing up she felt that her knee had flared up some.  -SG      Exercise 4    Exercise Name 4 (P)  (R) hip abd in sidelying  -SG      Cueing 4 (P)  Verbal  -SG      Reps 4 (P)  12  -SG      Additional Comments (P)  When attempted with a physioball against the wall, she felt a cramp on the lateral side of her leg which she attributed to her lateral ligamentous structures. That area was not tender to palpation. She did not have this sensation while performing it without a ball.  -SG      Exercise 5    Exercise Name 5 (P)  Assessed ambulation without cane for 10 feet  -SG      Additional Comments (P)  Patient demonstrated increased lateral sway and decreased time in (R) stance. Her knee extension in mid-stance was WNL.  -SG        User Key  (r) = Recorded By, (t) = Taken By, (c) = Cosigned By    Initials Name Provider Type    SUBHA Dao PT Student PT Student                        Manual Rx (last 36 hours)      Manual Treatments       08/09/17 0800          Manual Rx 1    Manual Rx 1 Location (P)  (R) posterior tibialis  -      Manual Rx 1 Type (P)  IASTM with EDGE tool  -SG      Manual Rx 1 Duration (P)  5  -SG        User Key  (r) = Recorded By, (t) = Taken By, (c) = Cosigned By    Initials Name Provider Type    SUBHA Dao PT Student PT Student                PT OP Goals       08/09/17 0800        PT Short Term Goals    STG Date to Achieve (P)  08/07/17  -SG     STG 1 (P)  Patient will improve active (R) knee flexion to 90+ degrees  -SG     STG 1 Progress (P)  Met  -SG     STG 2 (P)  Patient will decrease (R) knee swelling, measured at the mid patella, by 2.0 cm at least  -SG     STG 2 Progress (P)  Progressing   (L): 34.2cm, (R): 36.6cm  -SG     STG 3 (P)  Patient will decrease (R) knee extension lag with active SLR to <5 degrees  -SG     STG 3 Progress (P)  Met  -SG     STG 3 Progress Comments (P)  Patient has no extension lag for her current AROM during (R) SLR.  -SG     Long Term Goals    LTG Date to Achieve (P)  08/28/17  -SG     LTG 1 (P)  Patient will be independent with a comprehensive HEP by discharge.    -SG     LTG 1 Progress (P)  Progressing  -SG     LTG 2 (P)  Patient will ambulate with equal weight bearing using no assistive device, demonstrating no compensatory flexion, by discharge.  -SG     LTG 2 Progress (P)  Ongoing  -SG     LTG 3 (P)  Patient will demonstrate (R) active knee flexion to 120 degrees plus by discharge.  -SG     LTG 3 Progress (P)  Ongoing  -SG     LTG 4 (P)  Patient will demonstrate no compensatory weight shift with functional activites such as sit to stand, stand to sit, squatting, etc by discharge  -SG     LTG 4 Progress (P)  Ongoing  -SG     LTG 5 (P)  Patient will report symptoms <2/10 with ADL's and recreational activities (other than kayaking) including ambulating community distances by discharge.  -     LTG 5 Progress (P)  Ongoing  -SG     Time Calculation    PT Goal Re-Cert Due Date (P)  08/16/17  -       User Key  (r) = Recorded By, (t) = Taken By, (c) = Cosigned By    Initials Name Provider Type    SUBHA Dao, PT Student PT Student                Therapy Education       08/09/17 0800          Therapy Education    Education Details (P)  Patient was provided education that although she continues to experience a similar amount of pain as she did at the  begining of her rehab, she does not experience it nearly as early in her ROM.  -SG      Given (P)  Symptoms/condition management  -SG      Program (P)  Reinforced  -SG      How Provided (P)  Verbal  -SG      Provided to (P)  Patient  -SG      Level of Understanding (P)  Verbalized  -SG        User Key  (r) = Recorded By, (t) = Taken By, (c) = Cosigned By    Initials Name Provider Type    SG Luisa Dao, PT Student PT Student                Time Calculation:   Start Time: (P) 0800  Stop Time: (P) 0845  Time Calculation (min): (P) 45 min  PT Non-Billable Time (min): (P) 6 min    Therapy Charges for Today     Code Description Service Date Service Provider Modifiers Qty    90885205367 HC PT THER PROC EA 15 MIN 8/9/2017 Luisa Dao, PT Student GP 3                    Luisa Dao PT Student  8/9/2017

## 2017-08-15 ENCOUNTER — HOSPITAL ENCOUNTER (OUTPATIENT)
Dept: PHYSICAL THERAPY | Facility: HOSPITAL | Age: 67
Setting detail: THERAPIES SERIES
Discharge: HOME | End: 2017-08-15

## 2017-08-15 DIAGNOSIS — Z96.651 STATUS POST TOTAL RIGHT KNEE REPLACEMENT: Primary | ICD-10-CM

## 2017-08-15 PROCEDURE — 97110 THERAPEUTIC EXERCISES: CPT

## 2017-08-15 NOTE — THERAPY TREATMENT NOTE
Outpatient Physical Therapy Ortho Treatment Note  [facility]     Patient Name: Gayatri Bianchi  : 1950  MRN: [id number]  Today's Date: 8/15/2017      Visit Date: 08/15/2017    Visit Dx:    ICD-10-CM ICD-9-CM   1. Status post total right knee replacement Z96.651 V43.65       There is no problem list on file for this patient.       No past medical history on file.     Past Surgical History:   Procedure Laterality Date   • BREAST BIOPSY     • HYSTERECTOMY     • OOPHORECTOMY Right                              PT Assessment/Plan       08/15/17 1048       PT Assessment    Assessment Comments Her progress thus far is above average; however, at times she tends to over do some activities and with some activities she has some apprehension. She has had a significant improvement in her active flexion but a slight reduction in her extension. We will continue two to three times a week for the next three weeks and then we may either drop to once a week if not D/C at that point. Among her personal goals are to be able to travel to [place] in late September and be able to increase her gait performance, distance and tolerance without her SBQC. Thus we will update her goals to include gait distance, SLS, and proprioceptive objectives such as EC on dynamic surface for  a length of time , idealy thirty seconds.   -EC     PT Plan    PT Plan Comments Continue to progress and intensify proprioceptive activities, gait training, and allow her to work on strengthening some with us here but more on her own.  -EC       User Key  (r) = Recorded By, (t) = Taken By, (c) = Cosigned By    Initials Name Provider Type    EC Germán Pérez PTA Physical Therapy Assistant                    Exercises       08/15/17 1000 08/15/17 0900       Subjective Comments    Subjective Comments  Pt reports her physician was pleased with her progress thus far.  -EC     Subjective Pain    Able to rate subjective pain?  yes  -EC     Pre-Treatment Pain  Level  2  -EC     Post-Treatment Pain Level  2  -EC     Exercise 1    Exercise Name 1  assessed all goals for recertification  -EC     Exercise 2    Exercise Name 2  4 degrees AROM R TKE couldn  -EC     Exercise 3    Exercise Name 3 Shuttle press with 4 bands  -EC      Cueing 3 Verbal  -EC      Sets 3 2  -EC      Reps 3 10  -EC      Exercise 4    Exercise Name 4 Shuttle Press B DF  -EC      Sets 4 2  -EC      Reps 4 10  -EC      Additional Comments 4 bands  -EC      Exercise 5    Exercise Name 5 minisquats on wobble board M/L  -EC      Reps 5 10  -EC        User Key  (r) = Recorded By, (t) = Taken By, (c) = Cosigned By    Initials Name Provider Type    EC Germán Pérez PTA Physical Therapy Assistant                   Balance Exercises (last 24 hours)      Balance Exercises       08/15/17 1000          Stand with Feet Together    Activity Eyes open;Eyes closed;On foam;On rocker board   Theradisks, gray and blue foam max  -EC      Time (seconds) 30   30 EO 17 sec EC.  -EC       Tandem    Activity On foam;Eyes open;Eyes closed   threadisks, blue and gray foam for max  -EC      Time (seconds) --   improved to 30 sec static surface, 15 sex max all others  -EC        User Key  (r) = Recorded By, (t) = Taken By, (c) = Cosigned By    Initials Name Provider Type    EC Germán Pérez PTA Physical Therapy Assistant                     PT OP Goals       08/15/17 0932       PT Short Term Goals    STG Date to Achieve 08/07/17  -EC     STG 1 Patient will improve active (R) knee flexion to 90+ degrees  -EC     STG 1 Progress Met  -EC     STG 2 Patient will decrease (R) knee swelling, measured at the mid patella, by 2.0 cm at least  -EC     STG 2 Progress Progressing  -EC     STG 2 Progress Comments R LE 35.4, L LE 33.5  -EC     STG 3 Patient will decrease (R) knee extension lag with active SLR to <5 degrees  -EC     STG 3 Progress Met  -EC     Long Term Goals    LTG Date to Achieve 08/28/17  -EC     LTG 1 Patient  will be independent with a comprehensive HEP by discharge.    -EC     LTG 1 Progress Progressing  -EC     LTG 1 Progress Comments verbalized components  -EC     LTG 2 Patient will ambulate with equal weight bearing using no assistive device, demonstrating no compensatory flexion, by discharge.  -EC     LTG 2 Progress Ongoing  -EC     LTG 2 Progress Comments uses SBQC B shoulder listing bilaterally  -EC     LTG 3 Patient will demonstrate (R) active knee flexion to 120 degrees plus by discharge.  -EC     LTG 3 Progress Ongoing  -EC     LTG 3 Progress Comments 110 AROM post stretching  -EC     LTG 4 Patient will demonstrate no compensatory weight shift with functional activites such as sit to stand, stand to sit, squatting, etc by discharge  -EC     LTG 4 Progress Ongoing  -EC     LTG 4 Progress Comments L compensatory shift with sit to stand is decreased but remains very slight  -EC     LTG 5 Patient will report symptoms <2/10 with ADL's and recreational activities (other than kayaking) including ambulating community distances by discharge.  -EC     LTG 5 Progress Ongoing  -EC     LTG 5 Progress Comments 2/ 10 today  -EC     Time Calculation    PT Goal Re-Cert Due Date 09/14/17  -EC       User Key  (r) = Recorded By, (t) = Taken By, (c) = Cosigned By    Initials Name Provider Type    WILVER Pérez PTA Physical Therapy Assistant                Therapy Education       08/15/17 1029          Therapy Education    Given Symptoms/condition management  -EC      How Provided Verbal  -EC      Provided to Patient  -EC      Level of Understanding Verbalized;Demonstrated  -EC        User Key  (r) = Recorded By, (t) = Taken By, (c) = Cosigned By    Initials Name Provider Type    WILVER Pérez PTA Physical Therapy Assistant                Time Calculation:   Start Time: 0935  Stop Time: 1026  Time Calculation (min): 51 min  PT Non-Billable Time (min): 5 min  Total Timed Code Minutes- PT: 46 minute(s)    Therapy  Charges for Today     Code Description Service Date Service Provider Modifiers Qty    10455989006 HC PT THER PROC EA 15 MIN 8/15/2017 Germán Pérez, RICCI GP 3                    Germán Pérez, RICCI  8/15/2017

## 2017-08-17 ENCOUNTER — HOSPITAL ENCOUNTER (OUTPATIENT)
Dept: PHYSICAL THERAPY | Facility: HOSPITAL | Age: 67
Setting detail: THERAPIES SERIES
Discharge: HOME | End: 2017-08-17

## 2017-08-17 DIAGNOSIS — Z96.651 STATUS POST TOTAL RIGHT KNEE REPLACEMENT: Primary | ICD-10-CM

## 2017-08-17 PROCEDURE — 97110 THERAPEUTIC EXERCISES: CPT

## 2017-08-17 NOTE — THERAPY TREATMENT NOTE
Outpatient Physical Therapy Ortho Treatment Note  [facility]     Patient Name: Gayatri Bianchi  : 1950  MRN: [id number]  Today's Date: 2017      Visit Date: 2017    Visit Dx:    ICD-10-CM ICD-9-CM   1. Status post total right knee replacement Z96.651 V43.65       There is no problem list on file for this patient.       No past medical history on file.     Past Surgical History:   Procedure Laterality Date   • BREAST BIOPSY     • HYSTERECTOMY     • OOPHORECTOMY Right                              PT Assessment/Plan       17 0930       PT Assessment    Assessment Comments (P)  Patient has not made significant gains in her ROM but reports making significant functional gains. She has increased tolerance to WBing and in more comfortable in her available AROM. She demonstrates good control of her knee during squatting and lunging activities but has some compensations at her pelvis. Her (R) ankle is slow to give feedback during balance activities on the foam block but she does not demonstrate postural sway.   -SG     PT Plan    PT Plan Comments (P)  Continue to increase endurance for WB activities. Monitor (R) posterior tib and ankle mobility. Cont to work on (R) knee ROM.  -SG       User Key  (r) = Recorded By, (t) = Taken By, (c) = Cosigned By    Initials Name Provider Type    SUBHA Dao, PT Student PT Student                    Exercises       17 0930          Subjective Comments    Subjective Comments (P)  Pt reports she swam laps for 15min and walked in the pool for 10 min and this felt pretty good with her knee. At home, she is using her cane less and reports forgetting about it sometimes.  -SG      Subjective Pain    Able to rate subjective pain? (P)  yes  -SG      Pre-Treatment Pain Level (P)  2  -SG      Post-Treatment Pain Level (P)  2  -SG      Subjective Pain Comment (P)  Her scar feels more tender today.  -SG      Exercise 1    Exercise Name 1 (P)  Assessed (R) knee  ROM: 110 deg flexion, -5 deg extension  -SG      Exercise 2    Exercise Name 2 (P)  AAROM in supine on 55cm physioball  -SG      Time (Minutes) 2 (P)  8  -SG      Exercise 3    Exercise Name 3 (P)  LAQ in supine  -SG      Sets 3 (P)  2  -SG      Reps 3 (P)  8  -SG      Time (Seconds) 3 (P)  3  -SG      Exercise 4    Exercise Name 4 (P)  Standing with (R) knee extension resisted by blue theraband  -SG      Cueing 4 (P)  Verbal  -SG      Sets 4 (P)  3  -SG      Reps 4 (P)  10  -SG      Time (Seconds) 4 (P)  10  -SG      Exercise 5    Exercise Name 5 (P)  Minisquats and minilunges  -SG      Cueing 5 (P)  Verbal  -SG      Sets 5 (P)  2  -SG      Reps 5 (P)  10  -SG      Additional Comments (P)  Cueing for neutral pelvis  -SG      Exercise 6    Exercise Name 6 (P)  Gray foam feet together and tandem, EO and EC  -SG      Time (Minutes) 6 (P)  3 min total  -SG      Additional Comments (P)  (R) LE was not compensating for balance as much as the (L). Tandem stance with (L) in front wasa difficult for her (R) ankle  -SG      Exercise 7    Exercise Name 7 (P)  Sit-to-stand with 4# counter weight  -SG      Cueing 7 (P)  Verbal  -SG      Sets 7 (P)  1  -SG      Reps 7 (P)  8  -SG      Exercise 8    Exercise Name 8 (P)  Assessed (R) great toe ext and (R) ankle DF  -SG      Exercise 9    Exercise Name 9 (P)  Assessed incision. Slightly pinkish-red, intact, good mobility.  -SG        User Key  (r) = Recorded By, (t) = Taken By, (c) = Cosigned By    Initials Name Provider Type    SG Luisa Dao, PT Student PT Student                               PT OP Goals       08/17/17 1000 08/17/17 0930    PT Short Term Goals    STG Date to Achieve  (P)  08/07/17  -SG    STG 1  (P)  Patient will improve active (R) knee flexion to 90+ degrees  -SG    STG 1 Progress  (P)  Met  -SG    STG 2  (P)  Patient will decrease (R) knee swelling, measured at the mid patella, by 2.0 cm at least  -SG    STG 2 Progress  (P)  Progressing  -SG    STG 3   (P)  Patient will decrease (R) knee extension lag with active SLR to <5 degrees  -SG    STG 3 Progress  (P)  Met  -SG    Long Term Goals    LTG Date to Achieve  (P)  08/28/17  -SG    LTG 1  (P)  Patient will be independent with a comprehensive HEP by discharge.    -SG    LTG 1 Progress  (P)  Progressing  -SG    LTG 2  (P)  Patient will ambulate with equal weight bearing using no assistive device, demonstrating no compensatory flexion, by discharge.  -SG    LTG 2 Progress  (P)  Ongoing  -SG    LTG 3  (P)  Patient will demonstrate (R) active knee flexion to 120 degrees plus by discharge.  -SG    LTG 3 Progress  (P)  Ongoing  -SG    LTG 4  (P)  Patient will demonstrate no compensatory weight shift with functional activites such as sit to stand, stand to sit, squatting, etc by discharge  -SG    LTG 4 Progress  (P)  Ongoing  -SG    LTG 5  (P)  Patient will report symptoms <2/10 with ADL's and recreational activities (other than kayaking) including ambulating community distances by discharge.  -SG    LTG 5 Progress  (P)  Ongoing  -SG    LTG 6  (P)  Patient will be able to return to recreational activities such as hiking, climbing up/down inclines on uneven surfaces, etc. by discharge  -SG    LTG 6 Progress  (P)  New  -SG    Time Calculation    PT Goal Re-Cert Due Date (P)  09/14/17  -SG       User Key  (r) = Recorded By, (t) = Taken By, (c) = Cosigned By    Initials Name Provider Type    SUBHA Dao PT Student PT Student                Therapy Education       08/17/17 0930          Therapy Education    Education Details (P)  Stretch (R) great toe into extension  -SG      Given (P)  HEP  -SG      Program (P)  New  -SG      How Provided (P)  Verbal;Demonstration  -SG      Provided to (P)  Patient  -SG      Level of Understanding (P)  Verbalized;Demonstrated  -SG        User Key  (r) = Recorded By, (t) = Taken By, (c) = Cosigned By    Initials Name Provider Type    SUBHA Dao, PT Student PT Student                 Time Calculation:   Start Time: (P) 0930  Stop Time: (P) 1020  Time Calculation (min): (P) 50 min  PT Non-Billable Time (min): (P) 4 min    Therapy Charges for Today     Code Description Service Date Service Provider Modifiers Qty    91020157743 HC PT THER PROC EA 15 MIN 8/17/2017 Luisa Dao, PT Student GP 3                    Luisa Dao, PT Student  8/17/2017

## 2017-08-22 ENCOUNTER — HOSPITAL ENCOUNTER (OUTPATIENT)
Dept: PHYSICAL THERAPY | Facility: HOSPITAL | Age: 67
Setting detail: THERAPIES SERIES
Discharge: HOME | End: 2017-08-22

## 2017-08-22 DIAGNOSIS — Z96.651 STATUS POST TOTAL RIGHT KNEE REPLACEMENT: Primary | ICD-10-CM

## 2017-08-22 PROCEDURE — 97140 MANUAL THERAPY 1/> REGIONS: CPT

## 2017-08-22 PROCEDURE — 97032 APPL MODALITY 1+ESTIM EA 15: CPT

## 2017-08-22 NOTE — THERAPY TREATMENT NOTE
Outpatient Physical Therapy Ortho Treatment Note  [facility]     Patient Name: Gayatri Bianchi  : 1950  MRN: [id number]  Today's Date: 2017      Visit Date: 2017    Visit Dx:    ICD-10-CM ICD-9-CM   1. Status post total right knee replacement Z96.651 V43.65       There is no problem list on file for this patient.       No past medical history on file.     Past Surgical History:   Procedure Laterality Date   • BREAST BIOPSY     • HYSTERECTOMY     • OOPHORECTOMY Right                              PT Assessment/Plan       17 1057       PT Assessment    Assessment Comments I had advised her during our last session together that I would return a focus to IASTM for comparison which I performed today. Her soft tissue adhesions overall were significantly reduced as compared to previous; however, she has a significant trigger point in her medial distal gastroc. I advised her we will return a focus on strengthening and proprioceptive activities in the future.  -EC     PT Plan    PT Plan Comments Continue gait training and proprioceptive activities.  -EC       User Key  (r) = Recorded By, (t) = Taken By, (c) = Cosigned By    Initials Name Provider Type    EC Germán Pérez PTA Physical Therapy Assistant                Modalities       17 0900          Subjective Comments    Subjective Comments Pt reports improved overal walking and able to tolerate more stretching of the knee, may have overdone the stretching of FHL  -EC      ELECTRICAL STIMULATION    Attended/Unattended Attended  -EC      Stimulation Type --   Laser stim Chronic Inflammation Mode  -EC      Location/Electrode Placement/Other R knee globally  -EC      Rx Minutes 10 mins  -EC        User Key  (r) = Recorded By, (t) = Taken By, (c) = Cosigned By    Initials Name Provider Type    EC Germán Pérez PTA Physical Therapy Assistant                Exercises       17 0900          Subjective Comments    Subjective Comments  Pt reports improved overal walking and able to tolerate more stretching of the knee, may have overdone the stretching of FHL  -EC      Subjective Pain    Able to rate subjective pain? yes  -EC      Pre-Treatment Pain Level 3  -EC      Post-Treatment Pain Level 2  -EC        User Key  (r) = Recorded By, (t) = Taken By, (c) = Cosigned By    Initials Name Provider Type    EC Germán Pérez PTA Physical Therapy Assistant                        Manual Rx (last 36 hours)      Manual Treatments       08/22/17 0900          Manual Rx 1    Manual Rx 1 Location R hamstrings and gastroc  -EC      Manual Rx 1 Type IASTM with EDGE tool  -EC      Manual Rx 1 Duration 20   10 min each  -EC      Manual Rx 2    Manual Rx 2 Location R ITB, rectus femoris  -EC      Manual Rx 2 Type IASTM with EDGE tool  -EC      Manual Rx 2 Duration 10  -EC      Manual Rx 3    Manual Rx 3 Location R plantar surface  -EC      Manual Rx 3 Type IASTM with EDGE tool  -EC      Manual Rx 3 Duration 2  -EC      Manual Rx 4    Manual Rx 4 Location R LE  -EC      Manual Rx 4 Type manual prone flexion stretches, hooklying hamstring and calf stretches with contract/relax technique  -EC        User Key  (r) = Recorded By, (t) = Taken By, (c) = Cosigned By    Initials Name Provider Type     Germán Pérez PTA Physical Therapy Assistant                PT OP Goals       08/22/17 1000 08/22/17 0935    PT Short Term Goals    STG Date to Achieve 08/07/17  -EC 08/07/17  -EC    STG 1 Patient will improve active (R) knee flexion to 90+ degrees  -EC Patient will improve active (R) knee flexion to 90+ degrees  -EC    STG 1 Progress Met  -EC Met  -EC    STG 2 Patient will decrease (R) knee swelling, measured at the mid patella, by 2.0 cm at least  -EC Patient will decrease (R) knee swelling, measured at the mid patella, by 2.0 cm at least  -EC    STG 2 Progress Progressing  -EC Progressing  -EC    STG 3 Patient will decrease (R) knee extension lag with active SLR to  <5 degrees  -EC Patient will decrease (R) knee extension lag with active SLR to <5 degrees  -EC    STG 3 Progress Met  -EC Met  -EC    Long Term Goals    LTG Date to Achieve 08/28/17  -EC 08/28/17  -EC    LTG 1 Patient will be independent with a comprehensive HEP by discharge.    -EC Patient will be independent with a comprehensive HEP by discharge.    -EC    LTG 1 Progress Progressing  -EC Progressing  -EC    LTG 2 Patient will ambulate with equal weight bearing using no assistive device, demonstrating no compensatory flexion, by discharge.  -EC Patient will ambulate with equal weight bearing using no assistive device, demonstrating no compensatory flexion, by discharge.  -EC    LTG 2 Progress Ongoing  -EC Ongoing  -EC    LTG 3 Patient will demonstrate (R) active knee flexion to 120 degrees plus by discharge.  -EC Patient will demonstrate (R) active knee flexion to 120 degrees plus by discharge.  -EC    LTG 3 Progress Ongoing  -EC Ongoing  -EC    LTG 4 Patient will demonstrate no compensatory weight shift with functional activites such as sit to stand, stand to sit, squatting, etc by discharge  -EC Patient will demonstrate no compensatory weight shift with functional activites such as sit to stand, stand to sit, squatting, etc by discharge  -EC    LTG 4 Progress Ongoing  -EC Ongoing  -EC    LTG 4 Progress Comments improved R heel stike and toe off post treatment today.  -EC     LTG 5 Patient will report symptoms <2/10 with ADL's and recreational activities (other than kayaking) including ambulating community distances by discharge.  -EC Patient will report symptoms <2/10 with ADL's and recreational activities (other than kayaking) including ambulating community distances by discharge.  -EC    LTG 5 Progress Ongoing  -EC Ongoing  -EC    LTG 6 Patient will be able to return to recreational activities such as hiking, climbing up/down inclines on uneven surfaces, etc. by discharge  -EC Patient will be able to return to  recreational activities such as hiking, climbing up/down inclines on uneven surfaces, etc. by discharge  -EC    LTG 6 Progress New  -EC New  -EC    Time Calculation    PT Goal Re-Cert Due Date  09/14/17  -EC      User Key  (r) = Recorded By, (t) = Taken By, (c) = Cosigned By    Initials Name Provider Type    EC Germán Pérez PTA Physical Therapy Assistant                Therapy Education       08/22/17 1057          Therapy Education    Given Symptoms/condition management  -EC      Program New  -EC      How Provided Verbal  -EC      Provided to Patient  -EC      Level of Understanding Verbalized  -EC        User Key  (r) = Recorded By, (t) = Taken By, (c) = Cosigned By    Initials Name Provider Type    EC Germán Pérez PTA Physical Therapy Assistant                Time Calculation:   Start Time: 0935  Stop Time: 1025  Time Calculation (min): 50 min  Total Timed Code Minutes- PT: 50 minute(s)    Therapy Charges for Today     Code Description Service Date Service Provider Modifiers Qty    87968149115 HC PT MANUAL THERAPY EA 15 MIN 8/22/2017 Germán Pérez PTA GP 2    33894669078 HC PT ELEC STIM EA-PER 15 MIN 8/22/2017 Germán Pérez PTA GP 1                    Germán Pérez PTA  8/22/2017

## 2017-08-24 ENCOUNTER — HOSPITAL ENCOUNTER (OUTPATIENT)
Dept: PHYSICAL THERAPY | Facility: HOSPITAL | Age: 67
Setting detail: THERAPIES SERIES
Discharge: HOME | End: 2017-08-24

## 2017-08-24 DIAGNOSIS — Z96.651 STATUS POST TOTAL RIGHT KNEE REPLACEMENT: Primary | ICD-10-CM

## 2017-08-24 PROCEDURE — 97032 APPL MODALITY 1+ESTIM EA 15: CPT

## 2017-08-24 PROCEDURE — 97110 THERAPEUTIC EXERCISES: CPT

## 2017-08-24 NOTE — THERAPY TREATMENT NOTE
Outpatient Physical Therapy Ortho Treatment Note  [facility]     Patient Name: Gayatri Bianchi  : 1950  MRN: [id number]  Today's Date: 2017      Visit Date: 2017    Visit Dx:    ICD-10-CM ICD-9-CM   1. Status post total right knee replacement Z96.651 V43.65       There is no problem list on file for this patient.       No past medical history on file.     Past Surgical History:   Procedure Laterality Date   • BREAST BIOPSY     • HYSTERECTOMY     • OOPHORECTOMY Right                              PT Assessment/Plan       17 1306       PT Assessment    Assessment Comments She has significant difficulty with SLS and eccentric motion due to pain and I would summize that her patellar tendon is very restricted. Her pain increased with treatment today and I would suspect she may have an increase in swelling and I taped her in preparation for that.  -EC     PT Plan    PT Plan Comments Progress as symptoms allow.  -EC       User Key  (r) = Recorded By, (t) = Taken By, (c) = Cosigned By    Initials Name Provider Type    EC Germán Pérez PTA Physical Therapy Assistant                Modalities       17 1100          ELECTRICAL STIMULATION    Attended/Unattended Attended  -EC      Stimulation Type --   Laser stim Chronic Inflammation Mode  -EC      Location/Electrode Placement/Other R knee globally  -EC      Rx Minutes 10 mins  -EC        User Key  (r) = Recorded By, (t) = Taken By, (c) = Cosigned By    Initials Name Provider Type    EC Germán Pérez PTA Physical Therapy Assistant                Exercises       17 0900          Subjective Comments    Subjective Comments Pt reports R knee   -EC      Subjective Pain    Able to rate subjective pain? yes  -EC      Pre-Treatment Pain Level 3  -EC      Post-Treatment Pain Level 4  -EC      Exercise 1    Exercise Name 1 Unicam no resistance seat#2  -EC      Time (Minutes) 1 5  -EC      Exercise 2    Exercise Name 2 ascend/descend hill  "side x 60 ft.  -EC      Exercise 3    Exercise Name 3 standing TKE against the wall with red ball  -EC      Reps 3 10  -EC      Time (Seconds) 3 5 sec holds  -EC      Exercise 4    Exercise Name 4 multiple attempts SLS R LE  6 sec without substitutions   -EC      Exercise 5    Exercise Name 5 attempted eccentric step downs with 2 inch step but elicited too much pain.  -EC      Exercise 6    Exercise Name 6 applied Sure prep, KT to R thigh for lymphatic drainage and R gastroc \"Y\" strip 50% stretch for TPR  -EC        User Key  (r) = Recorded By, (t) = Taken By, (c) = Cosigned By    Initials Name Provider Type    WILVER Pérez, PTA Physical Therapy Assistant                               PT OP Goals       08/24/17 0900 08/24/17 0847    PT Short Term Goals    STG Date to Achieve 08/07/17  -EC     STG 1 Patient will improve active (R) knee flexion to 90+ degrees  -EC     STG 1 Progress Met  -EC     STG 2 Patient will decrease (R) knee swelling, measured at the mid patella, by 2.0 cm at least  -EC     STG 2 Progress Progressing  -EC     STG 2 Progress Comments R LE 35.6 today  -EC     STG 3 Patient will decrease (R) knee extension lag with active SLR to <5 degrees  -EC     STG 3 Progress Met  -EC     Long Term Goals    LTG Date to Achieve 08/28/17  -EC     LTG 1 Patient will be independent with a comprehensive HEP by discharge.    -EC     LTG 1 Progress Progressing  -EC     LTG 2 Patient will ambulate with equal weight bearing using no assistive device, demonstrating no compensatory flexion, by discharge.  -EC     LTG 2 Progress Ongoing  -EC     LTG 3 Patient will demonstrate (R) active knee flexion to 120 degrees plus by discharge.  -EC     LTG 3 Progress Ongoing  -EC     LTG 4 Patient will demonstrate no compensatory weight shift with functional activites such as sit to stand, stand to sit, squatting, etc by discharge  -EC     LTG 4 Progress Ongoing  -EC     LTG 5 Patient will report symptoms <2/10 with ADL's and " recreational activities (other than kayaking) including ambulating community distances by discharge.  -EC     LTG 5 Progress Ongoing  -EC     LTG 6 Patient will be able to return to recreational activities such as hiking, climbing up/down inclines on uneven surfaces, etc. by discharge  -EC     LTG 6 Progress New  -EC     Time Calculation    PT Goal Re-Cert Due Date  09/14/17  -EC      User Key  (r) = Recorded By, (t) = Taken By, (c) = Cosigned By    Initials Name Provider Type    WILVER Pérez PTA Physical Therapy Assistant                Therapy Education       08/24/17 1221          Therapy Education    Given Symptoms/condition management  -EC      How Provided Verbal  -EC      Provided to Patient  -EC      Level of Understanding Verbalized  -EC        User Key  (r) = Recorded By, (t) = Taken By, (c) = Cosigned By    Initials Name Provider Type    WILVER Pérez PTA Physical Therapy Assistant                Time Calculation:   Start Time: 0847  Stop Time: 0930  Time Calculation (min): 43 min  Total Timed Code Minutes- PT: 43 minute(s)    Therapy Charges for Today     Code Description Service Date Service Provider Modifiers Qty    01116084314 HC PT THER PROC EA 15 MIN 8/24/2017 Germán Pérez PTA GP 2    39758255491 HC PT ELEC STIM EA-PER 15 MIN 8/24/2017 Germán Pérez PTA GP 1                    Germán Pérez PTA  8/24/2017

## 2017-08-29 ENCOUNTER — HOSPITAL ENCOUNTER (OUTPATIENT)
Dept: PHYSICAL THERAPY | Facility: HOSPITAL | Age: 67
Setting detail: THERAPIES SERIES
Discharge: HOME | End: 2017-08-29

## 2017-08-29 DIAGNOSIS — Z96.651 STATUS POST TOTAL RIGHT KNEE REPLACEMENT: Primary | ICD-10-CM

## 2017-08-29 PROCEDURE — 97110 THERAPEUTIC EXERCISES: CPT

## 2017-08-29 NOTE — THERAPY TREATMENT NOTE
Outpatient Physical Therapy Ortho Treatment Note  [facility]     Patient Name: Gayatri Bianchi  : 1950  MRN: [id number]  Today's Date: 2017      Visit Date: 2017    Visit Dx:    ICD-10-CM ICD-9-CM   1. Status post total right knee replacement Z96.651 V43.65       There is no problem list on file for this patient.       No past medical history on file.     Past Surgical History:   Procedure Laterality Date   • BREAST BIOPSY     • HYSTERECTOMY     • OOPHORECTOMY Right                              PT Assessment/Plan       17 09       PT Assessment    Assessment Comments (P)  Patient has noted significant improvements over the last few days, gaining more range and not feeling any deep bone pain. While she still demonstrates decreased (R) calf strength and muscle bulk, her gait mechanics at her (R) knee and ankle have significantly improved. She holds her ankle in a more neutral prontion/supination position and complains of less cramping in her lower leg after walking. We progressed today to increased control of TKE in midstance by working on mini lunges forward and backward. We also worked on increasing the load to her patellar tendon using a place and hold squat technique to progress her towards squatting throughout the range.   -SG     PT Plan    PT Plan Comments (P)  Monitor (R) LE trigger points and soft tissue adhesions. Cont to incorporate eccentric control of (R) LE.   -       User Key  (r) = Recorded By, (t) = Taken By, (c) = Cosigned By    Initials Name Provider Type    SUBHA Dao, PT Student PT Student                    Exercises       17 09          Subjective Comments    Subjective Comments Patient reports noticing a lot of improvement over the last few days. She is now using stairs with a step through pattern, walking 1/3-1/2 mile at a time, and is not using her cane much anymore, an estimated 10% of the day. The tape was helpful for her calf. She is doing  a Pilates exercise program for folks 6 weeks to 3mo post total arthoplasty.  -RS      Subjective Pain    Able to rate subjective pain? yes  -RS      Pre-Treatment Pain Level 2  -RS      Post-Treatment Pain Level (P)  2  -SG      Subjective Pain Comment (P)  No more deep bone pain with exercises  -SG      Exercise 1    Exercise Name 1 Assessed sagittal knee ROM: 2/0/126  -RS      Time (Minutes) 1 (P)  5  -SG      Exercise 2    Exercise Name 2 (L) mini lunges forward and backward with emphasis on TKE  -RS      Cueing 2 Verbal;Demo  -RS      Additional Comments Chair for support  -RS      Exercise 3    Exercise Name 3 (P)  Place and hold squat  -SG      Cueing 3 (P)  Verbal  -SG      Sets 3 (P)  3  -SG      Reps 3 (P)  5  -SG      Time (Seconds) 3 (P)  ~15 sec  -SG      Exercise 4    Exercise Name 4 (P)  Backwards walking resisted by black theraband  -SG      Cueing 4 (P)  Verbal  -SG      Additional Comments (P)  100 feet total  -SG        User Key  (r) = Recorded By, (t) = Taken By, (c) = Cosigned By    Initials Name Provider Type    RS Amadou Hunt, PT DPT Physical Therapist    SG Luisa Dao, PT Student PT Student                               PT OP Goals       08/29/17 0900       PT Short Term Goals    STG Date to Achieve (P)  08/07/17  -SG     STG 1 (P)  Patient will improve active (R) knee flexion to 90+ degrees  -SG     STG 1 Progress (P)  Met  -SG     STG 2 (P)  Patient will decrease (R) knee swelling, measured at the mid patella, by 2.0 cm at least  -SG     STG 2 Progress (P)  Progressing  -SG     STG 3 (P)  Patient will decrease (R) knee extension lag with active SLR to <5 degrees  -SG     STG 3 Progress (P)  Met  -SG     Long Term Goals    LTG Date to Achieve (P)  08/28/17  -SG     LTG 1 (P)  Patient will be independent with a comprehensive HEP by discharge.    -SG     LTG 1 Progress (P)  Progressing  -SG     LTG 2 (P)  Patient will ambulate with equal weight bearing using no assistive  device, demonstrating no compensatory flexion, by discharge.  -SG     LTG 2 Progress (P)  Ongoing  -SG     LTG 3 (P)  Patient will demonstrate (R) active knee flexion to 120 degrees plus by discharge.  -SG     LTG 3 Progress (P)  Met  -SG     LTG 3 Progress Comments (P)  Patient measured 126 deg of (R) active knee flexion today.  -SG     LTG 4 (P)  Patient will demonstrate no compensatory weight shift with functional activites such as sit to stand, stand to sit, squatting, etc by discharge  -SG     LTG 4 Progress (P)  Ongoing  -SG     LTG 5 (P)  Patient will report symptoms <2/10 with ADL's and recreational activities (other than kayaking) including ambulating community distances by discharge.  -SG     LTG 5 Progress (P)  Ongoing  -SG     LTG 6 (P)  Patient will be able to return to recreational activities such as hiking, climbing up/down inclines on uneven surfaces, etc. by discharge  -SG     LTG 6 Progress (P)  New  -SG     Time Calculation    PT Goal Re-Cert Due Date (P)  09/14/17  -SG       User Key  (r) = Recorded By, (t) = Taken By, (c) = Cosigned By    Initials Name Provider Type    SG Luisa Dao, PT Student PT Student                Therapy Education       08/29/17 0900          Therapy Education    Education Details Patient instructed to only use cane in emergencies.   -RS      Given Posture/body mechanics;HEP;Symptoms/condition management  -RS      Program New  -RS      How Provided Verbal  -RS      Provided to Patient  -RS      Level of Understanding Verbalized  -RS        User Key  (r) = Recorded By, (t) = Taken By, (c) = Cosigned By    Initials Name Provider Type    RS Amadou Hunt, PT DPT Physical Therapist                Time Calculation:   Start Time: (P) 0934  Stop Time: (P) 1019  Time Calculation (min): (P) 45 min  PT Non-Billable Time (min): (P) 4 min    Therapy Charges for Today     Code Description Service Date Service Provider Modifiers Qty    12256707095  PT THER PROC EA 15  MIN 8/29/2017 Luisa Dao, PT Student GP 3                    Luisa Dao, PT Student  8/29/2017

## 2017-09-06 ENCOUNTER — HOSPITAL ENCOUNTER (OUTPATIENT)
Dept: PHYSICAL THERAPY | Facility: HOSPITAL | Age: 67
Setting detail: THERAPIES SERIES
Discharge: HOME | End: 2017-09-06

## 2017-09-06 DIAGNOSIS — Z96.651 STATUS POST TOTAL RIGHT KNEE REPLACEMENT: Primary | ICD-10-CM

## 2017-09-06 PROCEDURE — 97110 THERAPEUTIC EXERCISES: CPT

## 2017-09-06 NOTE — THERAPY DISCHARGE NOTE
Outpatient Physical Therapy Ortho Treatment Note/Discharge Summary   [facility]     Patient Name: Gayatri Bianchi  : 1950  MRN: [id number]  Today's Date: 2017      Visit Date: 2017    Visit Dx:    ICD-10-CM ICD-9-CM   1. Status post total right knee replacement Z96.651 V43.65       There is no problem list on file for this patient.       No past medical history on file.     Past Surgical History:   Procedure Laterality Date   • BREAST BIOPSY     • HYSTERECTOMY     • OOPHORECTOMY Right                              PT Assessment/Plan       17 1238       PT Assessment    Assessment Comments She has paritally met two goals and met the other remaining seven. She has the training to continue to progress on her own without continued skilled services and she definetly has the motiviation and compliance as well. We have worked with her on gait, balance, general strengthening of  B LE as well as functional strengthening later in the POC.   -EC     PT Plan    PT Plan Comments D/C with HEP  -EC       User Key  (r) = Recorded By, (t) = Taken By, (c) = Cosigned By    Initials Name Provider Type    EC Germán Pérez PTA Physical Therapy Assistant                    Exercises       17 1000 17 0800       Subjective Comments    Subjective Comments  She reports knee pain at night but overall she is doing better.She reports walked 1/2 mile with hills  -EC     Subjective Pain    Able to rate subjective pain?  yes  -EC     Pre-Treatment Pain Level  0  -EC     Post-Treatment Pain Level  2  -EC     Exercise 1    Exercise Name 1 assessed her gait with ascending descending grassy hillside 40 ft.  -EC      Exercise 2    Exercise Name 2 ascend/descend hill side x 60 ft.  -EC      Reps 2 --   60 ft  -EC      Additional Comments resisted with black T band  -EC      Exercise 3    Exercise Name 3 retro ascention uphill   -EC      Additional Comments black T band  -EC      Exercise 4    Exercise Name 4  resisted fwd/bwd gait   -EC      Reps 4 --   40 ft  -EC      Additional Comments black T band  -EC      Exercise 5    Exercise Name 5 resisted sidestepping B   -EC      Reps 5 2   40 ft  -EC      Additional Comments black T band  -EC      Exercise 6    Exercise Name 6 ascend/descend stairs  x 8  -EC      Exercise 7    Exercise Name 7 reviewed remaining goals for D/C status (see also goals section)  -EC      Exercise 8    Exercise Name 8 sit <>stands   -EC      Reps 8 10   each static surface, wobble board M/L, gray foam  -EC        User Key  (r) = Recorded By, (t) = Taken By, (c) = Cosigned By    Initials Name Provider Type    EC Germán Pérez PTA Physical Therapy Assistant                   Balance Exercises (last 24 hours)      Balance Exercises       09/06/17 1000          Midline Orientation    Activity Standing;On foam;Eyes open;Eyes closed;Head shakes  -EC      Time (seconds) 30  -EC      Stand with Feet Together    Activity Eyes open;Eyes closed;On foam;On BOSU  -EC      Time (seconds) 30   20 sec max on BOSU blue side up  -EC       Tandem    Activity On foam;Eyes open  -EC      Time (seconds) 30  -EC        User Key  (r) = Recorded By, (t) = Taken By, (c) = Cosigned By    Initials Name Provider Type    EC Germán Pérez PTA Physical Therapy Assistant                     PT OP Goals       09/06/17 1000 09/06/17 0851    PT Short Term Goals    STG Date to Achieve 08/07/17  -EC     STG 1 Patient will improve active (R) knee flexion to 90+ degrees  -EC     STG 1 Progress Met  -EC     STG 2 Patient will decrease (R) knee swelling, measured at the mid patella, by 2.0 cm at least  -EC     STG 2 Progress Met  -EC     STG 3 Patient will decrease (R) knee extension lag with active SLR to <5 degrees  -EC     STG 3 Progress Met  -EC     Long Term Goals    LTG Date to Achieve 08/28/17  -EC     LTG 1 Patient will be independent with a comprehensive HEP by discharge.    -EC     LTG 1 Progress Met  -EC     LTG  1 Progress Comments she is focusing on funtional mobility, no longer using E stim  -EC     LTG 2 Patient will ambulate with equal weight bearing using no assistive device, demonstrating no compensatory flexion, by discharge.  -EC     LTG 2 Progress Partially Met  -EC     LTG 2 Progress Comments walking without AD, has intermittent compensatory motion when not focused on gait  -EC     LTG 3 Patient will demonstrate (R) active knee flexion to 120 degrees plus by discharge.  -EC     LTG 3 Progress Met  -EC     LTG 4 Patient will demonstrate no compensatory weight shift with functional activites such as sit to stand, stand to sit, squatting, etc by discharge  -EC     LTG 4 Progress Partially Met  -EC     LTG 4 Progress Comments no conmpenstaion with sit to stand, intermittent compensation with gait when she is not focused   -EC     LTG 5 Patient will report symptoms <2/10 with ADL's and recreational activities (other than kayaking) including ambulating community distances by discharge.  -EC     LTG 5 Progress Met  -EC     LTG 5 Progress Comments 2/10 post session today  -EC     LTG 6 Patient will be able to return to recreational activities such as hiking, climbing up/down inclines on uneven surfaces, etc. by discharge  -EC     LTG 6 Progress Met  -EC     LTG 6 Progress Comments she has been able to walk 1/2 mile on uneven surfaces with minimal increase in pain.  -EC     Time Calculation    PT Goal Re-Cert Due Date  09/14/17  -EC      09/06/17 0800       PT Short Term Goals    STG Date to Achieve 08/07/17  -EC     STG 1 Patient will improve active (R) knee flexion to 90+ degrees  -EC     STG 1 Progress Met  -EC     STG 2 Patient will decrease (R) knee swelling, measured at the mid patella, by 2.0 cm at least  -EC     STG 2 Progress Progressing  -EC     STG 3 Patient will decrease (R) knee extension lag with active SLR to <5 degrees  -EC     STG 3 Progress Met  -EC     Long Term Goals    LTG Date to Achieve 08/28/17  -EC      LTG 1 Patient will be independent with a comprehensive HEP by discharge.    -EC     LTG 1 Progress Progressing  -EC     LTG 2 Patient will ambulate with equal weight bearing using no assistive device, demonstrating no compensatory flexion, by discharge.  -EC     LTG 2 Progress Ongoing  -EC     LTG 3 Patient will demonstrate (R) active knee flexion to 120 degrees plus by discharge.  -EC     LTG 3 Progress Met  -EC     LTG 4 Patient will demonstrate no compensatory weight shift with functional activites such as sit to stand, stand to sit, squatting, etc by discharge  -EC     LTG 4 Progress Ongoing  -EC     LTG 5 Patient will report symptoms <2/10 with ADL's and recreational activities (other than kayaking) including ambulating community distances by discharge.  -EC     LTG 5 Progress Ongoing  -EC     LTG 6 Patient will be able to return to recreational activities such as hiking, climbing up/down inclines on uneven surfaces, etc. by discharge  -EC     LTG 6 Progress New  -EC       User Key  (r) = Recorded By, (t) = Taken By, (c) = Cosigned By    Initials Name Provider Type    EC Germán Pérez PTA Physical Therapy Assistant                    Time Calculation:   Start Time: 0848  Stop Time: 0930  Time Calculation (min): 42 min  Total Timed Code Minutes- PT: 42 minute(s)    Therapy Charges for Today     Code Description Service Date Service Provider Modifiers Qty    23902908686 HC PT THER PROC EA 15 MIN 9/6/2017 Germán Pérez PTA GP 3                OP PT Discharge Summary  Date of Discharge: 09/06/17  Reason for Discharge: All goals achieved  Outcomes Achieved: Able to achieve all goals within established timeline  Discharge Destination: Home without follow-up  Discharge Instructions: see assessment comments      Germán Pérez PTA  9/6/2017

## 2018-01-16 ENCOUNTER — TRANSCRIBE ORDERS (OUTPATIENT)
Dept: ADMINISTRATIVE | Facility: HOSPITAL | Age: 68
End: 2018-01-16

## 2018-01-16 DIAGNOSIS — Z12.31 ENCOUNTER FOR SCREENING MAMMOGRAM FOR MALIGNANT NEOPLASM OF BREAST: Primary | ICD-10-CM

## 2018-01-17 ENCOUNTER — HOSPITAL ENCOUNTER (OUTPATIENT)
Dept: MAMMOGRAPHY | Facility: HOSPITAL | Age: 68
Discharge: HOME | End: 2018-01-17
Admitting: FAMILY MEDICINE

## 2018-01-17 DIAGNOSIS — Z12.31 ENCOUNTER FOR SCREENING MAMMOGRAM FOR MALIGNANT NEOPLASM OF BREAST: ICD-10-CM

## 2018-01-17 PROCEDURE — 77067 SCR MAMMO BI INCL CAD: CPT

## 2018-01-17 PROCEDURE — 77063 BREAST TOMOSYNTHESIS BI: CPT

## 2018-06-13 ENCOUNTER — HOSPITAL ENCOUNTER (OUTPATIENT)
Dept: NON INVASIVE DIAGNOSTICS | Age: 68
Discharge: HOME | End: 2018-06-13
Payer: MEDICARE

## 2018-06-13 ENCOUNTER — OFFICE VISIT (OUTPATIENT)
Dept: CARDIOLOGY | Age: 68
End: 2018-06-13
Payer: MEDICARE

## 2018-06-13 VITALS
DIASTOLIC BLOOD PRESSURE: 88 MMHG | SYSTOLIC BLOOD PRESSURE: 138 MMHG | HEART RATE: 72 BPM | BODY MASS INDEX: 25.95 KG/M2 | HEIGHT: 62 IN | WEIGHT: 141 LBS

## 2018-06-13 DIAGNOSIS — E78.2 MIXED HYPERLIPIDEMIA: ICD-10-CM

## 2018-06-13 DIAGNOSIS — I35.1 NONRHEUMATIC AORTIC VALVE INSUFFICIENCY: Primary | ICD-10-CM

## 2018-06-13 DIAGNOSIS — I35.1 NONRHEUMATIC AORTIC VALVE INSUFFICIENCY: ICD-10-CM

## 2018-06-13 LAB
LV EF: 58 %
LVEF MODALITY: NORMAL

## 2018-06-13 PROCEDURE — 4040F PNEUMOC VAC/ADMIN/RCVD: CPT | Performed by: INTERNAL MEDICINE

## 2018-06-13 PROCEDURE — G8419 CALC BMI OUT NRM PARAM NOF/U: HCPCS | Performed by: INTERNAL MEDICINE

## 2018-06-13 PROCEDURE — 1123F ACP DISCUSS/DSCN MKR DOCD: CPT | Performed by: INTERNAL MEDICINE

## 2018-06-13 PROCEDURE — 1090F PRES/ABSN URINE INCON ASSESS: CPT | Performed by: INTERNAL MEDICINE

## 2018-06-13 PROCEDURE — 1036F TOBACCO NON-USER: CPT | Performed by: INTERNAL MEDICINE

## 2018-06-13 PROCEDURE — G8427 DOCREV CUR MEDS BY ELIG CLIN: HCPCS | Performed by: INTERNAL MEDICINE

## 2018-06-13 PROCEDURE — 3017F COLORECTAL CA SCREEN DOC REV: CPT | Performed by: INTERNAL MEDICINE

## 2018-06-13 PROCEDURE — 93000 ELECTROCARDIOGRAM COMPLETE: CPT | Performed by: INTERNAL MEDICINE

## 2018-06-13 PROCEDURE — G8399 PT W/DXA RESULTS DOCUMENT: HCPCS | Performed by: INTERNAL MEDICINE

## 2018-06-13 PROCEDURE — 93306 TTE W/DOPPLER COMPLETE: CPT

## 2018-06-13 PROCEDURE — 99214 OFFICE O/P EST MOD 30 MIN: CPT | Performed by: INTERNAL MEDICINE

## 2018-06-21 ENCOUNTER — TELEPHONE (OUTPATIENT)
Dept: CARDIOLOGY | Age: 68
End: 2018-06-21

## 2018-06-27 ENCOUNTER — TELEPHONE (OUTPATIENT)
Dept: CARDIOLOGY | Age: 68
End: 2018-06-27

## 2018-07-12 ENCOUNTER — TRANSCRIBE ORDERS (OUTPATIENT)
Dept: PHYSICAL THERAPY | Facility: HOSPITAL | Age: 68
End: 2018-07-12

## 2018-07-12 DIAGNOSIS — M76.61 ACHILLES BURSITIS OF RIGHT LOWER EXTREMITY: Primary | ICD-10-CM

## 2018-07-13 ENCOUNTER — HOSPITAL ENCOUNTER (OUTPATIENT)
Dept: PHYSICAL THERAPY | Facility: HOSPITAL | Age: 68
Setting detail: THERAPIES SERIES
Discharge: HOME | End: 2018-07-13

## 2018-07-13 DIAGNOSIS — M79.671 HEEL PAIN, CHRONIC, RIGHT: Primary | ICD-10-CM

## 2018-07-13 DIAGNOSIS — G89.29 HEEL PAIN, CHRONIC, RIGHT: Primary | ICD-10-CM

## 2018-07-13 PROCEDURE — G8978 MOBILITY CURRENT STATUS: HCPCS

## 2018-07-13 PROCEDURE — G8979 MOBILITY GOAL STATUS: HCPCS

## 2018-07-13 PROCEDURE — 97161 PT EVAL LOW COMPLEX 20 MIN: CPT

## 2018-07-13 NOTE — THERAPY EVALUATION
Outpatient Physical Therapy Ortho Initial Evaluation   [facility]     Patient Name: Gayatri Bianchi  : 1950  MRN: [id number]  Today's Date: 2018      Visit Date: 2018    There is no problem list on file for this patient.       History reviewed. No pertinent past medical history.     Past Surgical History:   Procedure Laterality Date   • BREAST BIOPSY     • HYSTERECTOMY     • OOPHORECTOMY Right        Visit Dx:     ICD-10-CM ICD-9-CM   1. Heel pain, chronic, right M79.671 729.5    G89.29 338.29             Patient History     Row Name 18 0800             History    Chief Complaint Pain;Difficulty Walking;Difficulty with daily activities  -RS (r) PD (t) RS (c)      Type of Pain Ankle pain   heel pain  -RS (r) PD (t) RS (c)      Brief Description of Current Complaint Patient reports she began experiencing (R) heel pain a few weeks ago after a change in her activity level.   -RS (r) PD (t) RS (c)      Previous treatment for THIS PROBLEM Rehabilitation;Other (comment)  -RS (r) PD (t) RS (c)      Patient/Caregiver Goals Relieve pain;Return to prior level of function;Improve strength  -RS (r) PD (t) RS (c)      Current Tobacco Use no  -RS (r) PD (t) RS (c)      Smoking Status never smoker  -RS (r) PD (t) RS (c)      Patient's Rating of General Health Excellent  -RS (r) PD (t) RS (c)      Hand Dominance right-handed  -RS (r) PD (t) RS (c)      Occupation/sports/leisure activities retired physical therapist  -RS (r) PD (t) RS (c)      How has patient tried to help current problem? exercise; changing shoes; dry needling of (R) hip adductors  -RS (r) PD (t) RS (c)         Pain     Pain Location Other (Comment);Sternum   (R) heel  -RS (r) PD (t) RS (c)      Pain at Present 0  -RS (r) PD (t) RS (c)      Pain at Best 0  -RS (r) PD (t) RS (c)      Pain at Worst 5  -RS (r) PD (t) RS (c)      Pain Frequency Intermittent  -RS (r) PD (t) RS (c)      Pain Description Aching;Dull;Nagging  -RS (r) PD (t) RS (c)       What Performance Factors Make the Current Problem(s) WORSE? rest;light exercise  -RS (r) PD (t) RS (c)      What Performance Factors Make the Current Problem(s) BETTER? prolonged walking; navigating stairs  -RS (r) PD (t) RS (c)      Difficulties with recreational activities? patient reports difficulty exercising and walking for prolonged periods of time  -RS (r) PD (t) RS (c)         Fall Risk Assessment    Any falls in the past year: No  -RS (r) PD (t) RS (c)         Services    Prior Rehab/Home Health Experiences No  -RS (r) PD (t) RS (c)      Are you currently receiving Home Health services --  -RS (r) PD (t) RS (c)         Daily Activities    Primary Language English  -RS (r) PD (t) RS (c)      Are you able to read Yes  -RS (r) PD (t) RS (c)      Are you able to write Yes  -RS (r) PD (t) RS (c)      How does patient learn best? Listening;Reading;Demonstration  -RS (r) PD (t) RS (c)      Patient is concerned about/has problems with Walking;Climbing Stairs;Performing sports, recreation, and play activities  -RS (r) PD (t) RS (c)      Pt Participated in POC and Goals Yes  -RS (r) PD (t) RS (c)         Safety    Are you being hurt, hit, or frightened by anyone at home or in your life? No  -RS (r) PD (t) RS (c)        User Key  (r) = Recorded By, (t) = Taken By, (c) = Cosigned By    Initials Name Provider Type    RS Amadou Hunt, PT, DPT, OCS Physical Therapist    PD Oscar Valente, PT Student PT Student                PT Ortho     Row Name 07/13/18 0800       Precautions and Contraindications    Precautions/Limitations no known precautions/limitations  -RS (r) PD (t) RS (c)       Subjective Pain    Able to rate subjective pain? yes  -RS (r) PD (t) RS (c)    Pre-Treatment Pain Level 0  -RS (r) PD (t) RS (c)       Posture/Observations    Alignment Options Foot pronation  -RS (r) PD (t) RS (c)    Foot pronation Moderate;Increased  -RS (r) PD (t) RS (c)    Observations Muscle atrophy   muscle atrophy noted  in the right gastrocnemius  -RS (r) PD (t) RS (c)    Posture/Observations Comments Too many toes sign observed on the right (3) versus the left (2)  -RS (r) PD (t) RS (c)       Quarter Clearing    Quarter Clearing Lower Quarter Clearing  -RS (r) PD (t) RS (c)       Myotomal Screen- Lower Quarter Clearing    Hip flexion (L2) Bilateral:;4 (Good)  -RS (r) PD (t) RS (c)    Knee extension (L3) Bilateral:;4+ (Good +)  -RS (r) PD (t) RS (c)    Ankle DF (L4) Bilateral:;4+ (Good +)  -RS (r) PD (t) RS (c)    Great toe extension (L5) Bilateral:;4+ (Good +)  -RS (r) PD (t) RS (c)    Ankle PF (S1) Bilateral:;4+ (Good +)   Patient reported (R) heel pain after 10 heel raises  -RS (r) PD (t) RS (c)       Special Tests/Palpation    Special Tests/Palpation Ankle/Foot  -RS (r) PD (t) RS (c)       Foot/Ankle Palpation    Achilles' Tendon Right:;Tender;Guarded/taut;Fibrotic   upper medial aspect of the Achilles tendon  -RS (r) PD (t) RS (c)    Peroneals Right:;Tender;Guarded/taut  -RS (r) PD (t) RS (c)    Foot/Ankle Palpation? Yes  -RS (r) PD (t) RS (c)       Ankle Accessory Motions    Ankle Accessory Motions Tested? Yes  -RS (r) PD (t) RS (c)    Talocrural (talotibial) A-P glide WNL  -RS (r) PD (t) RS (c)    Subtalar medial/lateral tilt WNL  -RS (r) PD (t) RS (c)       Ankle/Foot Special Tests    Ankle Special Tests Comments Navicular drop test: 5 cm (R); 4 cm (L)  -RS (r) PD (t) RS (c)       General ROM    RT Lower Ext Rt Ankle Dorsiflexion  -RS (r) PD (t) RS (c)       Right Lower Ext    Rt Ankle Dorsiflexion AROM 12 degrees  -RS (r) PD (t) RS (c)       MMT (Manual Muscle Testing)    Additional Documentation General Assessment (Manual Muscle Testing) (Group)  -RS (r) PD (t) RS (c)       General Assessment (Manual Muscle Testing)    General Manual Muscle Testing (MMT) Assessment lower extremity strength deficits identified  -RS (r) PD (t) RS (c)       Lower Extremity (Manual Muscle Testing)    Lower Extremity: Manual Muscle Testing  (MMT) left hip strength deficit;right hip strength deficit  -RS (r) PD (t) RS (c)       Left Hip (Manual Muscle Testing)    Left Hip Manual Muscle Testing (MMT) abduction  -RS (r) PD (t) RS (c)    MMT: ABduction, Left Hip abduction  -RS (r) PD (t) RS (c)    MMT, Gross Movement: Left Hip ABduction (4/5) good  -RS (r) PD (t) RS (c)       Right Hip (Manual Muscle Testing)    Right Hip Manual Muscle Testing (MMT) abduction  -RS (r) PD (t) RS (c)    MMT: ABduction, Right Hip abduction  -RS (r) PD (t) RS (c)    MMT, Gross Movement: Right Hip ABduction (4-/5) good minus  -RS (r) PD (t) RS (c)       Sensation    Sensation WNL? WNL  -RS (r) PD (t) RS (c)       Pathomechanics    Lower Extremity Pathomechanics --   Increased foot pronation with stance phase on right vs left.  -RS (r) PD (t) RS (c)       Balance Skills Training    Balance Comments Patient able to maintain SLS for >30 seconds with eyes open without LOB, but demonstrates slight trendelenburg sign on the right versus the left. Patient is able to maintain SLS with eyes closed for 12 seconds on the left and 5 seconds on the right.  -RS (r) PD (t) RS (c)       Gait/Stairs Assessment/Training    Comment (Gait/Stairs) Patient demonstrates slight trendelenburg sign and increased foot pronation during midstance on the right versus the left. Patient also demonstrates increased supination of the right foot during toe-off on the right.  -RS (r) PD (t) RS (c)      User Key  (r) = Recorded By, (t) = Taken By, (c) = Cosigned By    Initials Name Provider Type    RS Amadou Hunt, PT, DPT, OCS Physical Therapist    ANSELMO Valente, PT Student PT Student                      Therapy Education  Education Details: Standing heel raises with focus on eccentric lowering; quadruped fire hydrants; side-lying clamshells  Given: HEP, Symptoms/condition management, Pain management  Program: New  How Provided: Verbal, Written  Provided to: Patient  Level of Understanding:  Verbalized           PT OP Goals     Row Name 07/13/18 0800          PT Short Term Goals    STG Date to Achieve 07/27/18  -RS (r) PD (t) RS (c)     STG 1 Patient will be able to maintain SLS on firm surface with eyes closed for >30 seconds without trendelenburg sign.  -RS (r) PD (t) RS (c)     STG 1 Progress New  -RS (r) PD (t) RS (c)     STG 2 Patient will demonstrate at least 4+/5 strength in hip abduction, bilaterally.  -RS (r) PD (t) RS (c)     STG 2 Progress New  -RS (r) PD (t) RS (c)     STG 3 Patient will score 85% or higher on the Lower Extremity Functional Scale.  -RS (r) PD (t) RS (c)     STG 3 Progress New  -RS (r) PD (t) RS (c)        Long Term Goals    LTG Date to Achieve 08/12/18  -RS (r) PD (t) RS (c)     LTG 1 Patient will be independent in a comprehensive HEP.  -RS (r) PD (t) RS (c)     LTG 1 Progress New  -RS (r) PD (t) RS (c)     LTG 2 Patient will be able to ascend/descend a flight of stairs with reciprocal pattern without right heel pain.  -RS     LTG 2 Progress New  -RS (r) PD (t) RS (c)     LTG 3 Patient will ambulate without right hip drop/lateral pelvic drift with midstance   -RS     LTG 3 Progress New  -RS (r) PD (t) RS (c)     LTG 4 Patient will be able to perform 20 (R) active heel raises through full range with symptoms <3/10  -RS     LTG 4 Progress New  -RS (r) PD (t) RS (c)     LTG 5 Patient will be able to negotiate inclines/declines on even/uneven terrain with (R) heel symptoms <3/10  -RS     LTG 5 Progress New  -RS (r) PD (t) RS (c)     LTG 5 Progress Comments to simulate hiking conditions for upcoming trip.  -RS        Time Calculation    PT Goal Re-Cert Due Date 08/12/18  -RS (r) PD (t) RS (c)       User Key  (r) = Recorded By, (t) = Taken By, (c) = Cosigned By    Initials Name Provider Type    RS Amadou Hunt, PT, DPT, OCS Physical Therapist    PD Oscar Valente, PT Student PT Student                PT Assessment/Plan     Row Name 07/13/18 0800          PT Assessment     Functional Limitations Impaired gait;Performance in self-care ADL;Performance in leisure activities;Performance in sport activities  -RS (r) PD (t) RS (c)     Impairments Balance;Gait;Muscle strength;Pain  -RS (r) PD (t) RS (c)     Assessment Comments Patient is a 67-year-old female who present to the clinic with primary complaint of right heel pain of approximately two months in duration. Patient states the pain began after an increase in her activity level following a period of relative inactivity. She reports the pain has gotten progressively worse since its onset, and is worse with walking for prolonged periods of time on uneven surfaces and negotiating stairs. The patient does demonstrate noticeable atrophy of the right gastrocnemius, which places an increased load on the Achilles tendon during functional activities such as walking and ascending stairs. During gait, she also demonstrates increased pronation during right midstance and supinates her foot shortly before toe-off in an attempt to compensate for the loss of power resulting from the right gastrocnemius weakness. The patient would benefit from skilled therapy to address her underlying strength deficits in her right gastrocnemius and correct her gait deviations, which are causing increased strain on the Achilles tendon and resulting in her pain with functional activities. 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 Thank you for allowing us to work with this patient.  -RS (r) PD (t) RS (c)     Please refer to paper survey for additional self-reported information Yes  -RS (r) PD (t) RS (c)     Rehab Potential Excellent  -RS (r) PD (t) RS (c)     Patient/caregiver participated in establishment of treatment plan and goals Yes  -RS (r) PD (t) RS (c)     Patient would benefit from skilled therapy intervention Yes  -RS (r) PD (t) RS (c)        PT Plan    PT Frequency 2x/week  -RS (r) PD  (t) RS (c)     Predicted Duration of Therapy Intervention (Therapy Eval) 4 weeks  -RS (r) PD (t) RS (c)     Planned CPT's? PT EVAL LOW COMPLEXITY: 59220;PT THER PROC EA 15 MIN: 44566;PT THER ACT EA 15 MIN: 77072;PT MANUAL THERAPY EA 15 MIN: 04232;PT ELECTRICAL STIM ATTD EA 15 MIN: 95499  -RS (r) PD (t) RS (c)     Physical Therapy Interventions (Optional Details) balance training;gait training;home exercise program;manual therapy techniques;modalities;neuromuscular re-education;patient/family education;postural re-education;stair training;strengthening;stretching;taping  -RS (r) PD (t) RS (c)     PT Plan Comments Initially, we will work to address the soft tissue limitations in the right gastrocnemius and start her on a exercise program to strengthen her right plantarflexors and hip abductors. We will also work to educate her on proper gait mechanics to reduce the stress on the Achilles tendon.  -RS (r) PD (t) RS (c)       User Key  (r) = Recorded By, (t) = Taken By, (c) = Cosigned By    Initials Name Provider Type    RS Amadou Hunt, PT, DPT, OCS Physical Therapist    PD Oscar Valente, PT Student PT Student                  Exercises     Row Name 07/13/18 0800             Subjective Pain    Able to rate subjective pain? yes  -RS (r) PD (t) RS (c)      Pre-Treatment Pain Level 0  -RS (r) PD (t) RS (c)        User Key  (r) = Recorded By, (t) = Taken By, (c) = Cosigned By    Initials Name Provider Type    RS Amadou Hunt, PT, DPT, OCS Physical Therapist    ANSELMO Valente, PT Student PT Student                        Outcome Measure Options: Lower Extremity Functional Scale (LEFS)  Lower Extremity Functional Index  Any of your usual work, housework or school activities: No difficulty  Your usual hobbies, recreational or sporting activities: Moderate difficulty  Getting into or out of the bath: No difficulty  Walking between rooms: No difficulty  Putting on your shoes or socks: No  difficulty  Squatting: No difficulty  Lifting an object, like a bag of groceries from the floor: No difficulty  Performing light activities around your home: No difficulty  Performing heavy activities around your home: A little bit of difficulty  Getting into or out of a car: No difficulty  Walking 2 blocks: A little bit of difficulty  Walking a mile: A little bit of difficulty  Going up or down 10 stairs (about 1 flight of stairs): A little bit of difficulty  Standing for 1 hour: A little bit of difficulty  Sitting for 1 hour: No difficulty  Running on even ground: Extreme difficulty or unable to perform activity  Running on uneven ground: Extreme difficulty or unable to perform activity  Making sharp turns while running fast: Extreme difficulty or unable to perform activity  Hopping: A little bit of difficulty  Rolling over in bed: No difficulty  Total: 60      Time Calculation:     Therapy Suggested Charges     Code   Minutes Charges    None             Start Time: 0835  Stop Time: 0925  Time Calculation (min): 50 min     Therapy Charges for Today     Code Description Service Date Service Provider Modifiers Qty    54229915275 HC PT MOBILITY CURRENT 7/13/2018 Amadou Hunt, PT, DPT, OCS GP, CI 1    61148325146 HC PT MOBILITY PROJECTED 7/13/2018 Amadou Hunt, PT, DPT, OCS GP, CH 1    96500786036 HC PT EVAL LOW COMPLEXITY 3 7/13/2018 Amadou Hunt, PT, DPT, OCS GP 1          PT G-Codes  PT Professional Judgement Used?: Yes  Outcome Measure Options: Lower Extremity Functional Scale (LEFS)  Score: 60  Functional Limitation: Mobility: Walking and moving around  Mobility: Walking and Moving Around Current Status (): At least 1 percent but less than 20 percent impaired, limited or restricted  Mobility: Walking and Moving Around Goal Status (): 0 percent impaired, limited or restricted         KIM Hunt, PT, DPT, OCS  7/13/2018

## 2018-07-17 ENCOUNTER — APPOINTMENT (OUTPATIENT)
Dept: PHYSICAL THERAPY | Facility: HOSPITAL | Age: 68
End: 2018-07-17

## 2018-07-18 ENCOUNTER — HOSPITAL ENCOUNTER (OUTPATIENT)
Dept: PHYSICAL THERAPY | Facility: HOSPITAL | Age: 68
Setting detail: THERAPIES SERIES
Discharge: HOME | End: 2018-07-18

## 2018-07-18 DIAGNOSIS — M79.671 HEEL PAIN, CHRONIC, RIGHT: Primary | ICD-10-CM

## 2018-07-18 DIAGNOSIS — G89.29 HEEL PAIN, CHRONIC, RIGHT: Primary | ICD-10-CM

## 2018-07-18 PROCEDURE — 97032 APPL MODALITY 1+ESTIM EA 15: CPT

## 2018-07-18 PROCEDURE — 97110 THERAPEUTIC EXERCISES: CPT

## 2018-07-18 PROCEDURE — 97140 MANUAL THERAPY 1/> REGIONS: CPT

## 2018-07-18 NOTE — THERAPY TREATMENT NOTE
Outpatient Physical Therapy Ortho Treatment Note  [facility]     Patient Name: Gayatri Bianchi  : 1950  MRN: [id number]  Today's Date: 2018      Visit Date: 2018    Visit Dx:    ICD-10-CM ICD-9-CM   1. Heel pain, chronic, right M79.671 729.5    G89.29 338.29       There is no problem list on file for this patient.       No past medical history on file.     Past Surgical History:   Procedure Laterality Date   • BREAST BIOPSY     • HYSTERECTOMY     • OOPHORECTOMY Right                              PT Assessment/Plan     Row Name 18 1640          PT Assessment    Assessment Comments No goals met at this time; however, today's session was the first treatment session following the evaluation. She did very well and was not challenged with the side lying wall ball hip adbuction/extension activity today, thus we will need to progress this as she exhibits B hip weakness with her gait. Her R tiba M/L glide is reduced as compaared to the L LE and we will need to address this. Her B hip IR is WFL but she has decreased R ER as compared to the L LE.  -EC        PT Plan    PT Plan Comments Assess the strength of her foot intrinsics, progress her hip strength, and consider reassessing her hip ER/IR.  -EC       User Key  (r) = Recorded By, (t) = Taken By, (c) = Cosigned By    Initials Name Provider Type    EC Germán Pérez PTA Physical Therapy Assistant                Modalities     Row Name 18 1500             ELECTRICAL STIMULATION    Attended/Unattended Attended  -EC      Stimulation Type --   Laser stim Chronic Inflammation   -EC      Location/Electrode Placement/Other R medial/lateral ankle and achilles tendon   -EC      66207 - PT Electrical Stimulation (Manual) Minutes 15  -EC        User Key  (r) = Recorded By, (t) = Taken By, (c) = Cosigned By    Initials Name Provider Type    WILVER Pérez PTA Physical Therapy Assistant                Exercises     Row Name 18 6662  "07/18/18 1500          Subjective Comments    Subjective Comments  -- Pt reports increasing her activity   -EC        Subjective Pain    Able to rate subjective pain?  -- yes  -EC     Pre-Treatment Pain Level  -- 2  -EC     Post-Treatment Pain Level  -- 2  -EC        Total Minutes    57522 - PT Therapeutic Exercise Minutes 21  -EC  --     79391 - PT Manual Therapy Minutes  -- 15  -EC        Exercise 1    Exercise Name 1  -- I assessed B hip ER/IR and B M/L tibia glides (see assessment section )  -EC        Exercise 2    Exercise Name 2  -- B unilateral sidelying wall ball hip abd/ext with 45 cm swiss ball  -EC     Reps 2  -- 15  -EC        Exercise 3    Exercise Name 3  -- assessed gait 2 x 40 ft (see assessment section)  -EC        Exercise 4    Exercise Name 4  -- Sure prep and KT \"Y\" strip 40% stretch for adhesion releease  -EC       User Key  (r) = Recorded By, (t) = Taken By, (c) = Cosigned By    Initials Name Provider Type    WILVER Pérez PTA Physical Therapy Assistant                        Manual Rx (last 36 hours)      Manual Treatments     Row Name 07/18/18 1500             Total Minutes    39889 - PT Manual Therapy Minutes 15  -EC         Manual Rx 1    Manual Rx 1 Location R gastroc  -EC      Manual Rx 1 Type IASTM with EDGE tool  -EC      Manual Rx 1 Duration 15  -EC        User Key  (r) = Recorded By, (t) = Taken By, (c) = Cosigned By    Initials Name Provider Type    WILVER Pérez PTA Physical Therapy Assistant                PT OP Goals     Row Name 07/18/18 1514          PT Short Term Goals    STG Date to Achieve 07/27/18  -EC     STG 1 Patient will be able to maintain SLS on firm surface with eyes closed for >30 seconds without trendelenburg sign.  -EC     STG 1 Progress Ongoing  -EC     STG 2 Patient will demonstrate at least 4+/5 strength in hip abduction, bilaterally.  -EC     STG 2 Progress Ongoing  -EC     STG 3 Patient will score 85% or higher on the Lower Extremity Functional " Scale.  -EC     STG 3 Progress Ongoing  -EC        Long Term Goals    LTG Date to Achieve 08/12/18  -EC     LTG 1 Patient will be independent in a comprehensive HEP.  -EC     LTG 1 Progress Ongoing  -EC     LTG 2 Patient will be able to ascend/descend a flight of stairs with reciprocal pattern without right heel pain.  -EC     LTG 2 Progress Ongoing  -EC     LTG 3 Patient will ambulate without right hip drop/lateral pelvic drift with midstance   -EC     LTG 3 Progress Ongoing  -EC     LTG 4 Patient will be able to perform 20 (R) active heel raises through full range with symptoms <3/10  -EC     LTG 4 Progress Ongoing  -EC     LTG 5 Patient will be able to negotiate inclines/declines on even/uneven terrain with (R) heel symptoms <3/10  -EC     LTG 5 Progress Ongoing  -EC        Time Calculation    PT Goal Re-Cert Due Date 08/12/18  -       User Key  (r) = Recorded By, (t) = Taken By, (c) = Cosigned By    Initials Name Provider Type    WILVER Pérez PTA Physical Therapy Assistant          Therapy Education  Given: Symptoms/condition management  How Provided: Verbal  Provided to: Patient  Level of Understanding: Verbalized              Time Calculation:   Start Time: 1514  Stop Time: 1605  Time Calculation (min): 51 min  Total Timed Code Minutes- PT: 51 minute(s)  Therapy Suggested Charges     Code   Minutes Charges    00924 (CPT®) Hc Pt Neuromusc Re Education Ea 15 Min      59211 (CPT®) Hc Pt Ther Proc Ea 15 Min 21 1    18523 (CPT®) Hc Gait Training Ea 15 Min      06785 (CPT®) Hc Pt Therapeutic Act Ea 15 Min      99633 (CPT®) Hc Pt Manual Therapy Ea 15 Min 15 1    99267 (CPT®) Hc Pt Ther Massage- Per 15 Min      72948 (CPT®) Hc Pt Iontophoresis Ea 15 Min      56786 (CPT®) Hc Pt Elec Stim Ea-Per 15 Min 15 1    47876 (CPT®) Hc Pt Ultrasound Ea 15 Min      99485 (CPT®) Hc Pt Self Care/Mgmt/Train Ea 15 Min      Total  51 3        Therapy Charges for Today     Code Description Service Date Service Provider  Modifiers Qty    04246035525 HC PT THER PROC EA 15 MIN 7/18/2018 Germán Pérez, PTA GP 1    01004000595 HC PT MANUAL THERAPY EA 15 MIN 7/18/2018 Germán Pérez, PTA GP 1    34680477530 HC PT ELEC STIM EA-PER 15 MIN 7/18/2018 Germán Pérez, PTA GP 1                    Germán Pérez, PTA  7/18/2018

## 2018-07-20 ENCOUNTER — HOSPITAL ENCOUNTER (OUTPATIENT)
Dept: PHYSICAL THERAPY | Facility: HOSPITAL | Age: 68
Setting detail: THERAPIES SERIES
Discharge: HOME | End: 2018-07-20

## 2018-07-20 DIAGNOSIS — G89.29 HEEL PAIN, CHRONIC, RIGHT: Primary | ICD-10-CM

## 2018-07-20 DIAGNOSIS — M79.671 HEEL PAIN, CHRONIC, RIGHT: Primary | ICD-10-CM

## 2018-07-20 PROCEDURE — 97140 MANUAL THERAPY 1/> REGIONS: CPT

## 2018-07-20 PROCEDURE — 97110 THERAPEUTIC EXERCISES: CPT

## 2018-07-20 NOTE — THERAPY TREATMENT NOTE
Outpatient Physical Therapy Ortho Treatment Note  [facility]     Patient Name: Gayatri Bianchi  : 1950  MRN: [id number]  Today's Date: 2018      Visit Date: 2018    Visit Dx:    ICD-10-CM ICD-9-CM   1. Heel pain, chronic, right M79.671 729.5    G89.29 338.29       There is no problem list on file for this patient.       No past medical history on file.     Past Surgical History:   Procedure Laterality Date   • BREAST BIOPSY     • HYSTERECTOMY     • OOPHORECTOMY Right                              PT Assessment/Plan     Row Name 18 1453          PT Assessment    Assessment Comments Her gait was improved today but she has a odd presentation most specifially R heel whip. Her M/L tibia glide improved post intervention today.  -EC        PT Plan    PT Plan Comments Continue to progress her ROM   -EC       User Key  (r) = Recorded By, (t) = Taken By, (c) = Cosigned By    Initials Name Provider Type    EC Germán Pérez PTA Physical Therapy Assistant                    Exercises     Row Name 18 1348 18 1300          Subjective Comments    Subjective Comments  -- Pt reports feeling better, still wearing the tape, but has also decreased her activity level approximately 10%  -EC        Subjective Pain    Able to rate subjective pain?  -- yes  -EC     Pre-Treatment Pain Level  -- 1  -EC     Post-Treatment Pain Level  -- 0  -EC        Total Minutes    90564 - PT Therapeutic Exercise Minutes 26  -EC  --     63994 - PT Manual Therapy Minutes  -- 16  -EC        Exercise 1    Exercise Name 1  -- DF/PF, pronantion/supination, CW, CCW seated with BAPS L2  -EC     Cueing 1  -- Verbal;Tactile  -EC     Time 1  -- 2 min each  -EC        Exercise 2    Exercise Name 2  -- walking B short foot x 10 ft  -EC     Reps 2  -- 2  -EC        Exercise 3    Exercise Name 3  -- standing short foot   -EC     Time 3  -- 1  -EC     Additional Comments  -- added to HEP  -EC        Exercise 4    Exercise Name 4   -- B unilateral hip air planes  -EC     Sets 4  -- 2  -EC     Reps 4  -- 10  -EC     Additional Comments  -- second set with red T band  -EC       User Key  (r) = Recorded By, (t) = Taken By, (c) = Cosigned By    Initials Name Provider Type    WILVER Pérez PTA Physical Therapy Assistant                        Manual Rx (last 36 hours)      Manual Treatments     Row Name 07/20/18 1300             Total Minutes    93387 - PT Manual Therapy Minutes 16  -EC        User Key  (r) = Recorded By, (t) = Taken By, (c) = Cosigned By    Initials Name Provider Type    WILVER Pérez PTA Physical Therapy Assistant                PT OP Goals     Row Name 07/20/18 1400 07/20/18 1348       PT Short Term Goals    STG Date to Achieve 07/27/18  -EC  --    STG 1 Patient will be able to maintain SLS on firm surface with eyes closed for >30 seconds without trendelenburg sign.  -EC  --    STG 1 Progress Ongoing  -EC  --    STG 2 Patient will demonstrate at least 4+/5 strength in hip abduction, bilaterally.  -EC  --    STG 2 Progress Ongoing  -EC  --    STG 3 Patient will score 85% or higher on the Lower Extremity Functional Scale.  -EC  --    STG 3 Progress Ongoing  -EC  --       Long Term Goals    LTG Date to Achieve 08/12/18  -EC  --    LTG 1 Patient will be independent in a comprehensive HEP.  -EC  --    LTG 1 Progress Ongoing  -EC  --    LTG 1 Progress Comments added standing short foot x 60 sec x 3 reps  -EC  --    LTG 2 Patient will be able to ascend/descend a flight of stairs with reciprocal pattern without right heel pain.  -EC  --    LTG 2 Progress Ongoing  -EC  --    LTG 3 Patient will ambulate without right hip drop/lateral pelvic drift with midstance   -EC  --    LTG 3 Progress Ongoing  -EC  --    LTG 4 Patient will be able to perform 20 (R) active heel raises through full range with symptoms <3/10  -EC  --    LTG 4 Progress Ongoing  -EC  --    LTG 5 Patient will be able to negotiate inclines/declines on  even/uneven terrain with (R) heel symptoms <3/10  -EC  --    LTG 5 Progress Ongoing  -EC  --       Time Calculation    PT Goal Re-Cert Due Date  -- 08/12/18  -EC      User Key  (r) = Recorded By, (t) = Taken By, (c) = Cosigned By    Initials Name Provider Type    EC Germán Pérez PTA Physical Therapy Assistant          Therapy Education  Education Details: standing B short foot x 60 sec x 3   Given: HEP  How Provided: Verbal  Provided to: Patient  Level of Understanding: Verbalized, Demonstrated              Time Calculation:   Start Time: 1348  Stop Time: 1430  Time Calculation (min): 42 min  Total Timed Code Minutes- PT: 42 minute(s)  Therapy Suggested Charges     Code   Minutes Charges    27084 (CPT®) Hc Pt Neuromusc Re Education Ea 15 Min      50031 (CPT®) Hc Pt Ther Proc Ea 15 Min 26 2    04224 (CPT®) Hc Gait Training Ea 15 Min      25059 (CPT®) Hc Pt Therapeutic Act Ea 15 Min      80660 (CPT®) Hc Pt Manual Therapy Ea 15 Min 16 1    21153 (CPT®) Hc Pt Ther Massage- Per 15 Min      12953 (CPT®) Hc Pt Iontophoresis Ea 15 Min      51862 (CPT®) Hc Pt Elec Stim Ea-Per 15 Min      13279 (CPT®) Hc Pt Ultrasound Ea 15 Min      64851 (CPT®) Hc Pt Self Care/Mgmt/Train Ea 15 Min      Total  42 3        Therapy Charges for Today     Code Description Service Date Service Provider Modifiers Qty    05866649446 HC PT THER PROC EA 15 MIN 7/20/2018 Germán Pérez PTA GP 2    94483012799 HC PT MANUAL THERAPY EA 15 MIN 7/20/2018 Germán Pérez PTA GP 1                    Germán Pérez PTA  7/20/2018

## 2018-07-25 ENCOUNTER — HOSPITAL ENCOUNTER (OUTPATIENT)
Dept: PHYSICAL THERAPY | Facility: HOSPITAL | Age: 68
Setting detail: THERAPIES SERIES
Discharge: HOME | End: 2018-07-25

## 2018-07-25 DIAGNOSIS — G89.29 HEEL PAIN, CHRONIC, RIGHT: Primary | ICD-10-CM

## 2018-07-25 DIAGNOSIS — M79.671 HEEL PAIN, CHRONIC, RIGHT: Primary | ICD-10-CM

## 2018-07-25 PROCEDURE — 97032 APPL MODALITY 1+ESTIM EA 15: CPT

## 2018-07-25 PROCEDURE — 97140 MANUAL THERAPY 1/> REGIONS: CPT

## 2018-07-25 NOTE — THERAPY TREATMENT NOTE
Outpatient Physical Therapy Ortho Treatment Note  [facility]     Patient Name: Gayatri Bianchi  : 1950  MRN: [id number]  Today's Date: 2018      Visit Date: 2018    Visit Dx:    ICD-10-CM ICD-9-CM   1. Heel pain, chronic, right M79.671 729.5    G89.29 338.29       There is no problem list on file for this patient.       No past medical history on file.     Past Surgical History:   Procedure Laterality Date   • BREAST BIOPSY     • HYSTERECTOMY     • OOPHORECTOMY Right                              PT Assessment/Plan     Row Name 18 1111          PT Assessment    Assessment Comments Her symptoms occur nightly but she has reported noticeable improvement. She exhibited significant restrictions in her distal R gastroc at the superior portion of the achilles that continues to the mid portion of the tendon itself. I was rather aggressive today with the STM and DFR and the plantar surface did not elicit any point tenderness but the distal gastroc and achilles tendon did ilicit point tenderness.  -EC        PT Plan    PT Plan Comments Assess her long term response to today's session, continue utilizing the Laser stim and EDGE tool and consider following up with LITA taping.  -EC       User Key  (r) = Recorded By, (t) = Taken By, (c) = Cosigned By    Initials Name Provider Type    WILVER Pérez PTA Physical Therapy Assistant                Modalities     Row Name 18 0900             ELECTRICAL STIMULATION    Attended/Unattended Attended  -EC      Stimulation Type --   Laser stim Chronic Inflammation   -EC      Location/Electrode Placement/Other R medial/lateral ankle and achilles tendon   -EC      19377 - PT Electrical Stimulation (Manual) Minutes 15  -EC        User Key  (r) = Recorded By, (t) = Taken By, (c) = Cosigned By    Initials Name Provider Type    WILVER Pérez PTA Physical Therapy Assistant                Exercises     Row Name 18 0848 18 0800           Subjective Comments    Subjective Comments  -- Pt reports she feels her symptoms more at night or after long periods of rest.   -EC        Subjective Pain    Able to rate subjective pain?  -- yes  -EC     Pre-Treatment Pain Level  -- 1  -EC     Post-Treatment Pain Level  -- 1  -EC        Total Minutes    64566 - PT Manual Therapy Minutes 35  -EC  --       User Key  (r) = Recorded By, (t) = Taken By, (c) = Cosigned By    Initials Name Provider Type    WILVER Pérez, PTA Physical Therapy Assistant                               PT OP Goals     Row Name 07/25/18 0848          PT Short Term Goals    STG Date to Achieve 07/27/18  -EC     STG 1 Patient will be able to maintain SLS on firm surface with eyes closed for >30 seconds without trendelenburg sign.  -EC     STG 1 Progress Ongoing  -EC     STG 2 Patient will demonstrate at least 4+/5 strength in hip abduction, bilaterally.  -EC     STG 2 Progress Ongoing  -EC     STG 3 Patient will score 85% or higher on the Lower Extremity Functional Scale.  -EC     STG 3 Progress Ongoing  -EC        Long Term Goals    LTG Date to Achieve 08/12/18  -EC     LTG 1 Patient will be independent in a comprehensive HEP.  -EC     LTG 1 Progress Ongoing  -EC     LTG 2 Patient will be able to ascend/descend a flight of stairs with reciprocal pattern without right heel pain.  -EC     LTG 2 Progress Ongoing  -EC     LTG 2 Progress Comments per her report it doesn't seem to irritate her symptoms while or immediately after performing  -EC     LTG 3 Patient will ambulate without right hip drop/lateral pelvic drift with midstance   -EC     LTG 3 Progress Ongoing  -EC     LTG 4 Patient will be able to perform 20 (R) active heel raises through full range with symptoms <3/10  -EC     LTG 4 Progress Ongoing  -EC     LTG 5 Patient will be able to negotiate inclines/declines on even/uneven terrain with (R) heel symptoms <3/10  -EC     LTG 5 Progress Ongoing  -EC        Time Calculation    PT Goal  Re-Cert Due Date 08/12/18  -EC       User Key  (r) = Recorded By, (t) = Taken By, (c) = Cosigned By    Initials Name Provider Type    EC Germán Pérez PTA Physical Therapy Assistant                         Time Calculation:   Start Time: 0848  Stop Time: 0938  Time Calculation (min): 50 min  Total Timed Code Minutes- PT: 50 minute(s)  Therapy Suggested Charges     Code   Minutes Charges    16360 (CPT®) Hc Pt Neuromusc Re Education Ea 15 Min      45934 (CPT®) Hc Pt Ther Proc Ea 15 Min      87137 (CPT®) Hc Gait Training Ea 15 Min      22358 (CPT®) Hc Pt Therapeutic Act Ea 15 Min      97525 (CPT®) Hc Pt Manual Therapy Ea 15 Min 35 2    87041 (CPT®) Hc Pt Ther Massage- Per 15 Min      00906 (CPT®) Hc Pt Iontophoresis Ea 15 Min      88202 (CPT®) Hc Pt Elec Stim Ea-Per 15 Min 15 1    77623 (CPT®) Hc Pt Ultrasound Ea 15 Min      18459 (CPT®) Hc Pt Self Care/Mgmt/Train Ea 15 Min      Total  50 3        Therapy Charges for Today     Code Description Service Date Service Provider Modifiers Qty    39548903564 HC PT MANUAL THERAPY EA 15 MIN 7/25/2018 Germán Pérez PTA GP 2    92349492265 HC PT ELEC STIM EA-PER 15 MIN 7/25/2018 Germán Pérez PTA GP 1                    Germán Pérez PTA  7/25/2018

## 2018-07-27 ENCOUNTER — HOSPITAL ENCOUNTER (OUTPATIENT)
Dept: PHYSICAL THERAPY | Facility: HOSPITAL | Age: 68
Setting detail: THERAPIES SERIES
Discharge: HOME | End: 2018-07-27

## 2018-07-27 DIAGNOSIS — G89.29 HEEL PAIN, CHRONIC, RIGHT: Primary | ICD-10-CM

## 2018-07-27 DIAGNOSIS — M79.671 HEEL PAIN, CHRONIC, RIGHT: Primary | ICD-10-CM

## 2018-07-27 DIAGNOSIS — Z96.651 STATUS POST TOTAL RIGHT KNEE REPLACEMENT: ICD-10-CM

## 2018-07-27 PROCEDURE — 97032 APPL MODALITY 1+ESTIM EA 15: CPT

## 2018-07-27 PROCEDURE — 97140 MANUAL THERAPY 1/> REGIONS: CPT

## 2018-07-27 NOTE — THERAPY TREATMENT NOTE
Outpatient Physical Therapy Ortho Treatment Note  [facility]     Patient Name: Gayatri Bianchi  : 1950  MRN: [id number]  Today's Date: 2018      Visit Date: 2018    Visit Dx:    ICD-10-CM ICD-9-CM   1. Heel pain, chronic, right M79.671 729.5    G89.29 338.29   2. Status post total right knee replacement Z96.651 V43.65       There is no problem list on file for this patient.       No past medical history on file.     Past Surgical History:   Procedure Laterality Date   • BREAST BIOPSY     • HYSTERECTOMY     • OOPHORECTOMY Right                              PT Assessment/Plan     Row Name 18 1643          PT Assessment    Assessment Comments She continues to improve and while she still exhibits L hip weakness and this causes the R hip to drop we utilized video analysis today and she did exhibit a reduction in these deficits.   -EC        PT Plan    PT Plan Comments Continue to progress as her symptoms present.  -EC       User Key  (r) = Recorded By, (t) = Taken By, (c) = Cosigned By    Initials Name Provider Type     Germán Pérez PTA Physical Therapy Assistant                Modalities     Row Name 18 1500 18 1300          Subjective Comments    Subjective Comments  -- She reports   -EC        Subjective Pain    Able to rate subjective pain?  -- yes  -EC     Pre-Treatment Pain Level  -- 0  -EC     Post-Treatment Pain Level  -- 0  -EC        ELECTRICAL STIMULATION    Attended/Unattended Attended  -EC  --     Stimulation Type --   Laser stim Chronic Inflammation   -EC  --     Location/Electrode Placement/Other R medial/lateral ankle and achilles tendon   -EC  --     31232 - PT Electrical Stimulation (Manual) Minutes 15  -EC  --       User Key  (r) = Recorded By, (t) = Taken By, (c) = Cosigned By    Initials Name Provider Type    WILVER Pérez PTA Physical Therapy Assistant                Exercises     Row Name 18 1500 18 1300          Subjective Comments  "   Subjective Comments  -- She reports   -EC        Subjective Pain    Able to rate subjective pain?  -- yes  -EC     Pre-Treatment Pain Level  -- 0  -EC     Post-Treatment Pain Level  -- 0  -EC        Total Minutes    85197 - PT Manual Therapy Minutes 35  -EC  --       User Key  (r) = Recorded By, (t) = Taken By, (c) = Cosigned By    Initials Name Provider Type    WILVER Pérez, RICCI Physical Therapy Assistant                        Manual Rx (last 36 hours)      Manual Treatments     Row Name 07/27/18 1500             Total Minutes    88295 - PT Manual Therapy Minutes 35  -EC         Manual Rx 5    Manual Rx 5 Location Sure prep, 2 KT \"I\" srips transversing achilles correction strips,  one \"Y\" stip for adhsion release along R gastroc  -EC      Manual Rx 5 Duration 5  -EC        User Key  (r) = Recorded By, (t) = Taken By, (c) = Cosigned By    Initials Name Provider Type    WILVER Pérez, RICCI Physical Therapy Assistant                PT OP Goals     Row Name 07/27/18 1425          PT Short Term Goals    STG Date to Achieve 07/27/18  -EC     STG 1 Patient will be able to maintain SLS on firm surface with eyes closed for >30 seconds without trendelenburg sign.  -EC     STG 1 Progress Ongoing  -EC     STG 2 Patient will demonstrate at least 4+/5 strength in hip abduction, bilaterally.  -EC     STG 2 Progress Ongoing  -EC     STG 3 Patient will score 85% or higher on the Lower Extremity Functional Scale.  -EC     STG 3 Progress Ongoing  -EC        Long Term Goals    LTG Date to Achieve 08/12/18  -EC     LTG 1 Patient will be independent in a comprehensive HEP.  -EC     LTG 1 Progress Ongoing  -EC     LTG 2 Patient will be able to ascend/descend a flight of stairs with reciprocal pattern without right heel pain.  -EC     LTG 2 Progress Ongoing  -EC     LTG 2 Progress Comments reports able to perform pain free  -EC     LTG 3 Patient will ambulate without right hip drop/lateral pelvic drift with midstance   -EC "     LTG 3 Progress Ongoing  -EC     LTG 4 Patient will be able to perform 20 (R) active heel raises through full range with symptoms <3/10  -EC     LTG 4 Progress Ongoing  -EC     LTG 5 Patient will be able to negotiate inclines/declines on even/uneven terrain with (R) heel symptoms <3/10  -EC     LTG 5 Progress Ongoing  -EC        Time Calculation    PT Goal Re-Cert Due Date 08/12/18  -EC       User Key  (r) = Recorded By, (t) = Taken By, (c) = Cosigned By    Initials Name Provider Type    EC Germán Pérez PTA Physical Therapy Assistant          Therapy Education  Given: Symptoms/condition management  How Provided: Verbal  Provided to: Patient  Level of Understanding: Verbalized              Time Calculation:   Start Time: 1425  Stop Time: 1515  Time Calculation (min): 50 min  Therapy Suggested Charges     Code   Minutes Charges    27992 (CPT®) Hc Pt Neuromusc Re Education Ea 15 Min      48525 (CPT®) Hc Pt Ther Proc Ea 15 Min      11180 (CPT®) Hc Gait Training Ea 15 Min      73428 (CPT®) Hc Pt Therapeutic Act Ea 15 Min      01072 (CPT®) Hc Pt Manual Therapy Ea 15 Min 35 2    18393 (CPT®) Hc Pt Ther Massage- Per 15 Min      70322 (CPT®) Hc Pt Iontophoresis Ea 15 Min      51771 (CPT®) Hc Pt Elec Stim Ea-Per 15 Min 15 1    94757 (CPT®) Hc Pt Ultrasound Ea 15 Min      75521 (CPT®) Hc Pt Self Care/Mgmt/Train Ea 15 Min      Total  50 3        Therapy Charges for Today     Code Description Service Date Service Provider Modifiers Qty    33516668316 HC PT MANUAL THERAPY EA 15 MIN 7/27/2018 Germán Pérez PTA GP 2    43000589464 HC PT ELEC STIM EA-PER 15 MIN 7/27/2018 Germán Pérez PTA GP 1                    Germán Pérez PTA  7/27/2018

## 2018-08-01 ENCOUNTER — HOSPITAL ENCOUNTER (OUTPATIENT)
Dept: PHYSICAL THERAPY | Facility: HOSPITAL | Age: 68
Setting detail: THERAPIES SERIES
Discharge: HOME | End: 2018-08-01

## 2018-08-01 DIAGNOSIS — G89.29 HEEL PAIN, CHRONIC, RIGHT: Primary | ICD-10-CM

## 2018-08-01 DIAGNOSIS — M79.671 HEEL PAIN, CHRONIC, RIGHT: Primary | ICD-10-CM

## 2018-08-01 PROCEDURE — 97032 APPL MODALITY 1+ESTIM EA 15: CPT

## 2018-08-01 PROCEDURE — 97140 MANUAL THERAPY 1/> REGIONS: CPT

## 2018-08-03 ENCOUNTER — HOSPITAL ENCOUNTER (OUTPATIENT)
Dept: PHYSICAL THERAPY | Facility: HOSPITAL | Age: 68
Setting detail: THERAPIES SERIES
Discharge: HOME | End: 2018-08-03

## 2018-08-03 DIAGNOSIS — M79.671 HEEL PAIN, CHRONIC, RIGHT: Primary | ICD-10-CM

## 2018-08-03 DIAGNOSIS — G89.29 HEEL PAIN, CHRONIC, RIGHT: Primary | ICD-10-CM

## 2018-08-03 PROCEDURE — 97032 APPL MODALITY 1+ESTIM EA 15: CPT

## 2018-08-03 PROCEDURE — 97140 MANUAL THERAPY 1/> REGIONS: CPT

## 2018-08-03 PROCEDURE — 97110 THERAPEUTIC EXERCISES: CPT

## 2018-08-03 NOTE — THERAPY PROGRESS REPORT/RE-CERT
Outpatient Physical Therapy Ortho Progress Note   [place]     Patient Name: Gayatri Bianchi  : 1950  MRN: [id number]  Today's Date: 8/3/2018      Visit Date: 2018    Visit Dx:    ICD-10-CM ICD-9-CM   1. Heel pain, chronic, right M79.671 729.5    G89.29 338.29       There is no problem list on file for this patient.       No past medical history on file.     Past Surgical History:   Procedure Laterality Date   • BREAST BIOPSY     • HYSTERECTOMY     • OOPHORECTOMY Right                              PT Assessment/Plan     Row Name 18 1129 18 0917       PT Assessment    Functional Limitations Impaired gait;Performance in self-care ADL;Performance in leisure activities;Performance in sport activities  -RS  --    Impairments Balance;Gait;Muscle strength;Pain  -RS  --    Assessment Comments She is able to perform many activities now without symptom increase as compared to initially. Stair ambulation is no longer an issue but more than twenty consecutive heel raises causes an increase in symptoms. She has met two of her goals and progressed towards all her other remaining goals.  -EC  --    Please refer to paper survey for additional self-reported information Yes  -RS  --    Rehab Potential Excellent  -RS  --    Patient/caregiver participated in establishment of treatment plan and goals Yes  -RS  --    Patient would benefit from skilled therapy intervention Yes  -RS  --       PT Plan    PT Frequency 1x/week;2x/week  -RS  --    Predicted Duration of Therapy Intervention (Therapy Eval) 4 weeks   do be determined upon return from vacation  -RS  --    Planned CPT's? PT EVAL LOW COMPLEXITY: 70499;PT THER PROC EA 15 MIN: 55965;PT THER ACT EA 15 MIN: 87444;PT MANUAL THERAPY EA 15 MIN: 07159;PT ELECTRICAL STIM ATTD EA 15 MIN: 25528  -RS  --    Physical Therapy Interventions (Optional Details) balance training;gait training;home exercise program;manual therapy techniques;modalities;neuromuscular  re-education;patient/family education;postural re-education;stair training;strengthening;stretching;taping  -RS  --    PT Plan Comments She will be out of town for two weeks and we will put the POC on hold at this time. She will monitor her symptoms and recontact us if she has an increase in symptoms and requires further intervention.  -EC She will be out of town for the next twoo weeks and we will put the POC on hold at this time and have her monitor her symptom response and recontact us if she has any increase in symptoms.  -EC      User Key  (r) = Recorded By, (t) = Taken By, (c) = Cosigned By    Initials Name Provider Type    Germán Carpenter PTA Physical Therapy Assistant    Amadou Loyola, PT, DPT, OCS Physical Therapist                Modalities     Row Name 08/03/18 0900             Subjective Comments    Subjective Comments She reprots feeling 80% improved/recovered  -EC         Subjective Pain    Able to rate subjective pain? yes  -EC      Pre-Treatment Pain Level 0  -EC      Post-Treatment Pain Level 0  -EC         ELECTRICAL STIMULATION    Attended/Unattended Attended  -EC      Stimulation Type --   Laser stim Chronic Inflammation   -EC      Location/Electrode Placement/Other R medial achilles tendon   -EC      07873 - PT Electrical Stimulation (Manual) Minutes 10  -EC        User Key  (r) = Recorded By, (t) = Taken By, (c) = Cosigned By    Initials Name Provider Type    Germán Carpenter PTA Physical Therapy Assistant                Exercises     Row Name 08/03/18 1000 08/03/18 0900          Subjective Comments    Subjective Comments  -- She reprots feeling 80% improved/recovered  -EC        Subjective Pain    Able to rate subjective pain?  -- yes  -EC     Pre-Treatment Pain Level  -- 0  -EC     Post-Treatment Pain Level  -- 0  -EC        Total Minutes    07614 - PT Therapeutic Exercise Minutes  -- 25  -EC     38420 - PT Manual Therapy Minutes  -- 15  -EC        Exercise 1     "Exercise Name 1 reviewed all goals for progress note (see goals section  -EC  --        Exercise 2    Exercise Name 2 Sure prep KT \"I\" correction strips x 2 transversing R achilles, one \"Y' strip along R gastroc for adhesion release  -EC  --       User Key  (r) = Recorded By, (t) = Taken By, (c) = Cosigned By    Initials Name Provider Type    Germán Carpenter PTA Physical Therapy Assistant                        Manual Rx (last 36 hours)      Manual Treatments     Row Name 08/03/18 0900             Total Minutes    79033 - PT Manual Therapy Minutes 15  -EC        User Key  (r) = Recorded By, (t) = Taken By, (c) = Cosigned By    Initials Name Provider Type    Germán Carpenter PTA Physical Therapy Assistant                PT OP Goals     Row Name 08/03/18 0900          PT Short Term Goals    STG Date to Achieve 08/17/18  -EC     STG 1 Patient will be able to maintain SLS on firm surface with eyes closed for >30 seconds without trendelenburg sign.  -EC     STG 1 Progress Ongoing  -EC     STG 1 Progress Comments 15 sec. R LE  -EC     STG 2 Patient will demonstrate at least 4+/5 strength in hip abduction, bilaterally.  -EC     STG 2 Progress Met  -EC     STG 2 Progress Comments 4+/5  -EC     STG 3 Patient will score 85% or higher on the Lower Extremity Functional Scale.  -EC     STG 3 Progress Ongoing  -EC     STG 3 Progress Comments 76 today  -EC        Long Term Goals    LTG Date to Achieve 09/02/18  -EC     LTG 1 Patient will be independent in a comprehensive HEP.  -EC     LTG 1 Progress Ongoing  -EC     LTG 1 Progress Comments reports compliance  -EC     LTG 2 Patient will be able to ascend/descend a flight of stairs with reciprocal pattern without right heel pain.  -EC     LTG 2 Progress Met  -EC     LTG 2 Progress Comments without HR  -EC     LTG 3 Patient will ambulate without right hip drop/lateral pelvic drift with midstance   -EC     LTG 3 Progress Ongoing  -EC     LTG 3 Progress Comments continues to " have B hip drop but is reduced as compared to initial  -EC     LTG 4 Patient will be able to perform 20 (R) active heel raises through full range with symptoms <3/10  -EC     LTG 4 Progress Ongoing  -EC     LTG 4 Progress Comments can perform 20 consecutive but pain increases to 5/10  -EC     LTG 5 Patient will be able to negotiate inclines/declines on even/uneven terrain with (R) heel symptoms <3/10  -EC     LTG 5 Progress Ongoing  -EC     LTG 5 Progress Comments ascended/descended and traansversed B hillside by clinic without increase in pain  -EC       User Key  (r) = Recorded By, (t) = Taken By, (c) = Cosigned By    Initials Name Provider Type    Germán Carpenter, PTA Physical Therapy Assistant          Therapy Education  Given: Symptoms/condition management  How Provided: Verbal  Provided to: Patient  Level of Understanding: Verbalized    Outcome Measure Options: Lower Extremity Functional Scale (LEFS)  Lower Extremity Functional Index  Any of your usual work, housework or school activities: No difficulty  Your usual hobbies, recreational or sporting activities: No difficulty  Getting into or out of the bath: No difficulty  Walking between rooms: No difficulty  Putting on your shoes or socks: No difficulty  Squatting: No difficulty  Lifting an object, like a bag of groceries from the floor: No difficulty  Performing light activities around your home: No difficulty  Performing heavy activities around your home: No difficulty  Getting into or out of a car: No difficulty  Walking 2 blocks: A little bit of difficulty  Walking a mile: A little bit of difficulty  Going up or down 10 stairs (about 1 flight of stairs): No difficulty  Standing for 1 hour: No difficulty  Sitting for 1 hour: No difficulty  Running on even ground: No difficulty  Running on uneven ground: No difficulty  Making sharp turns while running fast: A little bit of difficulty  Hopping: A little bit of difficulty  Rolling over in bed: No  difficulty  Total: 76      Time Calculation:   Start Time: 0935  Stop Time: 1025  Time Calculation (min): 50 min  Total Timed Code Minutes- PT: 50 minute(s)  Therapy Suggested Charges     Code   Minutes Charges    09480 (CPT®) Hc Pt Neuromusc Re Education Ea 15 Min      63803 (CPT®) Hc Pt Ther Proc Ea 15 Min 25 2    79071 (CPT®) Hc Gait Training Ea 15 Min      87330 (CPT®) Hc Pt Therapeutic Act Ea 15 Min      66870 (CPT®) Hc Pt Manual Therapy Ea 15 Min 15 1    97238 (CPT®) Hc Pt Ther Massage- Per 15 Min      26990 (CPT®) Hc Pt Iontophoresis Ea 15 Min      17006 (CPT®) Hc Pt Elec Stim Ea-Per 15 Min 10     24570 (CPT®) Hc Pt Ultrasound Ea 15 Min      78900 (CPT®) Hc Pt Self Care/Mgmt/Train Ea 15 Min      Total  50 3        Therapy Charges for Today     Code Description Service Date Service Provider Modifiers Qty    07738148781 HC PT THER PROC EA 15 MIN 8/3/2018 Germán Pérez, PTA GP 1    18119318193 HC PT MANUAL THERAPY EA 15 MIN 8/3/2018 Germán Pérez, PTA GP 1    66781943858 HC PT ELEC STIM EA-PER 15 MIN 8/3/2018 Germán Pérez, PTA GP 1          PT G-Codes  Outcome Measure Options: Lower Extremity Functional Scale (LEFS)         Germán Pérez PTA  8/3/2018

## 2018-08-08 ENCOUNTER — APPOINTMENT (OUTPATIENT)
Dept: PHYSICAL THERAPY | Facility: HOSPITAL | Age: 68
End: 2018-08-08

## 2018-08-10 ENCOUNTER — APPOINTMENT (OUTPATIENT)
Dept: PHYSICAL THERAPY | Facility: HOSPITAL | Age: 68
End: 2018-08-10

## 2018-11-13 ENCOUNTER — DOCUMENTATION (OUTPATIENT)
Dept: PHYSICAL THERAPY | Facility: HOSPITAL | Age: 68
End: 2018-11-13

## 2018-11-13 DIAGNOSIS — G89.29 HEEL PAIN, CHRONIC, RIGHT: Primary | ICD-10-CM

## 2018-11-13 DIAGNOSIS — M79.671 HEEL PAIN, CHRONIC, RIGHT: Primary | ICD-10-CM

## 2018-11-13 DIAGNOSIS — Z96.651 STATUS POST TOTAL RIGHT KNEE REPLACEMENT: ICD-10-CM

## 2018-11-13 NOTE — THERAPY DISCHARGE NOTE
Outpatient Physical Therapy Discharge Summary         Patient Name: Gayatri Bianchi  : 1950  MRN: [id number]    Today's Date: 2018    Visit Dx:    ICD-10-CM ICD-9-CM   1. Heel pain, chronic, right M79.671 729.5    G89.29 338.29   2. Status post total right knee replacement Z96.651 V43.65       PT OP Goals     Row Name 18 1400          PT Short Term Goals    STG Date to Achieve  18  -EC     STG 1  Patient will be able to maintain SLS on firm surface with eyes closed for >30 seconds without trendelenburg sign.  -EC     STG 1 Progress  Partially Met  -EC     STG 2  Patient will demonstrate at least 4+/5 strength in hip abduction, bilaterally.  -EC     STG 2 Progress  Met  -EC     STG 3  Patient will score 85% or higher on the Lower Extremity Functional Scale.  -EC     STG 3 Progress  Not Met  -EC        Long Term Goals    LTG Date to Achieve  18  -EC     LTG 1  Patient will be independent in a comprehensive HEP.  -EC     LTG 1 Progress  Met  -EC     LTG 2  Patient will be able to ascend/descend a flight of stairs with reciprocal pattern without right heel pain.  -EC     LTG 2 Progress  Met  -EC     LTG 3  Patient will ambulate without right hip drop/lateral pelvic drift with midstance   -EC     LTG 3 Progress  Partially Met  -EC     LTG 4  Patient will be able to perform 20 (R) active heel raises through full range with symptoms <3/10  -EC     LTG 4 Progress  Partially Met  -EC     LTG 5  Patient will be able to negotiate inclines/declines on even/uneven terrain with (R) heel symptoms <3/10  -EC     LTG 5 Progress  Met  -EC     LTG 6  Patient will be able to return to recreational activities such as hiking, climbing up/down inclines on uneven surfaces, etc. by discharge  -EC     LTG 6 Progress  Met  -EC       User Key  (r) = Recorded By, (t) = Taken By, (c) = Cosigned By    Initials Name Provider Type    Germán Carpenter PTA Physical Therapy Assistant          OP PT Discharge  Summary  Date of Discharge: 11/13/18  Reason for Discharge: All goals achieved  Outcomes Achieved: Able to achieve all goals within established timeline  Discharge Destination: Home with home program  Discharge Instructions/Additional Comments: We have been working with this patient to reduce the adhesions in her R gastroc, improve her gait pattern by increasing her L hip strength and utilizing video analysis to educate and correct her habitual  heel rotation at heel strike and reduce her R lateral pelvic drift. She has a comprehensive HEP which she is motivated and proficient with its' performance and should continue to progress on her own without required continued skilled services.      Time Calculation:        Therapy Suggested Charges     Code   Minutes Charges    None                       Germán Pérez, PTA  11/13/2018

## 2019-12-18 ENCOUNTER — LAB (OUTPATIENT)
Dept: LAB | Facility: HOSPITAL | Age: 69
End: 2019-12-18

## 2019-12-18 ENCOUNTER — TRANSCRIBE ORDERS (OUTPATIENT)
Dept: LAB | Facility: HOSPITAL | Age: 69
End: 2019-12-18

## 2019-12-18 DIAGNOSIS — R50.9 FEVER, UNSPECIFIED: Primary | ICD-10-CM

## 2019-12-18 DIAGNOSIS — R50.9 FEVER, UNSPECIFIED: ICD-10-CM

## 2019-12-18 LAB
FLUAV AG NPH QL: NEGATIVE
FLUBV AG NPH QL IA: POSITIVE

## 2019-12-18 PROCEDURE — 87804 INFLUENZA ASSAY W/OPTIC: CPT

## 2020-03-16 ENCOUNTER — OFFICE VISIT (OUTPATIENT)
Dept: SURGERY | Age: 70
End: 2020-03-16
Payer: MEDICARE

## 2020-03-16 VITALS
DIASTOLIC BLOOD PRESSURE: 86 MMHG | BODY MASS INDEX: 27.42 KG/M2 | WEIGHT: 149 LBS | HEART RATE: 79 BPM | SYSTOLIC BLOOD PRESSURE: 171 MMHG | TEMPERATURE: 98 F | HEIGHT: 62 IN

## 2020-03-16 PROCEDURE — 99203 OFFICE O/P NEW LOW 30 MIN: CPT | Performed by: PHYSICIAN ASSISTANT

## 2020-03-16 PROCEDURE — 4040F PNEUMOC VAC/ADMIN/RCVD: CPT | Performed by: PHYSICIAN ASSISTANT

## 2020-03-16 PROCEDURE — 3017F COLORECTAL CA SCREEN DOC REV: CPT | Performed by: PHYSICIAN ASSISTANT

## 2020-03-16 PROCEDURE — G8399 PT W/DXA RESULTS DOCUMENT: HCPCS | Performed by: PHYSICIAN ASSISTANT

## 2020-03-16 PROCEDURE — 1036F TOBACCO NON-USER: CPT | Performed by: PHYSICIAN ASSISTANT

## 2020-03-16 PROCEDURE — G8427 DOCREV CUR MEDS BY ELIG CLIN: HCPCS | Performed by: PHYSICIAN ASSISTANT

## 2020-03-16 PROCEDURE — G8419 CALC BMI OUT NRM PARAM NOF/U: HCPCS | Performed by: PHYSICIAN ASSISTANT

## 2020-03-16 PROCEDURE — 1090F PRES/ABSN URINE INCON ASSESS: CPT | Performed by: PHYSICIAN ASSISTANT

## 2020-03-16 PROCEDURE — 1123F ACP DISCUSS/DSCN MKR DOCD: CPT | Performed by: PHYSICIAN ASSISTANT

## 2020-03-16 PROCEDURE — G8484 FLU IMMUNIZE NO ADMIN: HCPCS | Performed by: PHYSICIAN ASSISTANT

## 2020-03-16 RX ORDER — DIAZEPAM 5 MG/1
TABLET ORAL
Qty: 5 TABLET | Refills: 0 | OUTPATIENT
Start: 2020-03-16 | End: 2020-03-31

## 2020-03-16 RX ORDER — CHLORAL HYDRATE 500 MG
1200 CAPSULE ORAL PRN
COMMUNITY
End: 2022-09-07 | Stop reason: ALTCHOICE

## 2020-03-16 RX ORDER — TURMERIC 400 MG
CAPSULE ORAL
COMMUNITY
End: 2021-11-09 | Stop reason: ALTCHOICE

## 2020-03-16 RX ORDER — CALCIUM CARBONATE 500(1250)
1200 TABLET ORAL DAILY PRN
COMMUNITY
End: 2022-03-23

## 2020-03-16 RX ORDER — MULTIVIT-MIN/IRON/FOLIC ACID/K 18-600-40
CAPSULE ORAL DAILY
COMMUNITY

## 2020-03-16 NOTE — PROGRESS NOTES
Subjective:      Patient ID: Remi Ortiz is a 81 y.o. female. HPI  Ms. Lalit Elder presents to establish care for an abnormal mammogram and US demonstrating a spiculated mass in each breast.  US demonstrated several abnormalities, but did not definitely correlate to the mammogram.  Stereotactic biopsy has been recommended. Remi Ortiz is a 81 y.o. female with the following history as recorded in [facility]:  Patient Active Problem List    Diagnosis Date Noted    History of colonic polyps 07/20/2016    Intermittent constipation 07/20/2016     Current Outpatient Medications   Medication Sig Dispense Refill    Cholecalciferol (VITAMIN D) 50 MCG (2000 UT) CAPS capsule Take by mouth      Turmeric 400 MG CAPS Take by mouth      Omega-3 Fatty Acids (FISH OIL) 1000 MG CAPS Take 3,000 mg by mouth 3 times daily      calcium carbonate (OSCAL) 500 MG TABS tablet Take 500 mg by mouth daily      Probiotic Product (PROBIOTIC-10 PO) Take by mouth      diazePAM (VALIUM) 5 MG tablet Bring bottle to hospital and take as directed. 5 tablet 0    conjugated estrogens (PREMARIN) 0.625 MG/GM vaginal cream Place vaginally daily Place vaginally daily.  Multiple Vitamins-Minerals (VISION FORMULA PO) Take by mouth      amitriptyline (ELAVIL) 10 MG tablet Take 10 mg by mouth nightly.  magnesium (MAGNESIUM-OXIDE) 250 MG TABS tablet Take 250 mg by mouth as needed       Fexofenadine HCl (ALLEGRA PO) Take 180 mg by mouth daily as needed       Zolpidem Tartrate (AMBIEN PO) Take 5 mg by mouth nightly as needed        No current facility-administered medications for this visit.       Allergies: Codeine  Past Medical History:   Diagnosis Date    Arthritis     osteoarthritis    Colon polyps     FHx: migraine headaches     Heart disease     leaky valve    Hyperlipidemia     Joint pain     Lipids abnormal      Past Surgical History:   Procedure Laterality Date    CHOLECYSTECTOMY      COLONOSCOPY  06/2011     cysts. Recommendation is for the patient to return for routine mammography in   one year or sooner, if clinically indicated. BI-RADS CATEGORY 2: BENIGN FINDINGS      We will see her in 6 months for bilateral ultrasound   Ap DIAS  [address], PA-C

## 2020-03-18 ENCOUNTER — HOSPITAL ENCOUNTER (OUTPATIENT)
Dept: WOMENS IMAGING | Age: 70
Discharge: HOME | End: 2020-03-18
Payer: MEDICARE

## 2020-03-18 ENCOUNTER — PROCEDURE VISIT (OUTPATIENT)
Dept: SURGERY | Age: 70
End: 2020-03-18

## 2020-03-18 PROCEDURE — 76642 ULTRASOUND BREAST LIMITED: CPT

## 2020-03-18 PROCEDURE — G0279 TOMOSYNTHESIS, MAMMO: HCPCS

## 2020-04-06 ENCOUNTER — HOSPITAL ENCOUNTER (OUTPATIENT)
Dept: NON INVASIVE DIAGNOSTICS | Age: 70
Discharge: HOME | End: 2020-04-06
Payer: MEDICARE

## 2020-04-06 LAB
LV EF: 50 %
LVEF MODALITY: NORMAL

## 2020-04-06 PROCEDURE — 93350 STRESS TTE ONLY: CPT

## 2020-04-06 RX ORDER — SODIUM CHLORIDE 0.9 % (FLUSH) 0.9 %
10 SYRINGE (ML) INJECTION PRN
Status: CANCELLED | OUTPATIENT
Start: 2020-04-06 | End: 2020-04-06

## 2020-04-06 RX ORDER — METOPROLOL TARTRATE 5 MG/5ML
5 INJECTION INTRAVENOUS EVERY 5 MIN PRN
Status: CANCELLED | OUTPATIENT
Start: 2020-04-06 | End: 2020-04-06

## 2020-04-06 RX ORDER — ATROPINE SULFATE 0.1 MG/ML
0.5 INJECTION INTRAVENOUS EVERY 5 MIN PRN
Status: CANCELLED | OUTPATIENT
Start: 2020-04-06 | End: 2020-04-06

## 2020-04-06 RX ORDER — SODIUM CHLORIDE 9 MG/ML
500 INJECTION, SOLUTION INTRAVENOUS CONTINUOUS PRN
Status: CANCELLED | OUTPATIENT
Start: 2020-04-06 | End: 2020-04-06

## 2020-04-06 RX ORDER — NITROGLYCERIN 0.4 MG/1
0.4 TABLET SUBLINGUAL EVERY 5 MIN PRN
Status: CANCELLED | OUTPATIENT
Start: 2020-04-06 | End: 2020-04-06

## 2020-04-22 ENCOUNTER — TELEMEDICINE (OUTPATIENT)
Dept: CARDIOLOGY | Age: 70
End: 2020-04-22
Payer: MEDICARE

## 2020-04-22 PROBLEM — I51.7 LVH (LEFT VENTRICULAR HYPERTROPHY): Status: ACTIVE | Noted: 2020-04-22

## 2020-04-22 PROBLEM — R07.89 CHEST TIGHTNESS: Status: ACTIVE | Noted: 2020-04-22

## 2020-04-22 PROBLEM — I38 VALVULAR HEART DISEASE: Status: ACTIVE | Noted: 2020-04-22

## 2020-04-22 PROBLEM — I10 ESSENTIAL HYPERTENSION: Status: ACTIVE | Noted: 2020-04-22

## 2020-04-22 PROCEDURE — 3017F COLORECTAL CA SCREEN DOC REV: CPT | Performed by: CLINICAL NURSE SPECIALIST

## 2020-04-22 PROCEDURE — G8399 PT W/DXA RESULTS DOCUMENT: HCPCS | Performed by: CLINICAL NURSE SPECIALIST

## 2020-04-22 PROCEDURE — 4040F PNEUMOC VAC/ADMIN/RCVD: CPT | Performed by: CLINICAL NURSE SPECIALIST

## 2020-04-22 PROCEDURE — 1090F PRES/ABSN URINE INCON ASSESS: CPT | Performed by: CLINICAL NURSE SPECIALIST

## 2020-04-22 PROCEDURE — 1123F ACP DISCUSS/DSCN MKR DOCD: CPT | Performed by: CLINICAL NURSE SPECIALIST

## 2020-04-22 PROCEDURE — 99213 OFFICE O/P EST LOW 20 MIN: CPT | Performed by: CLINICAL NURSE SPECIALIST

## 2020-04-22 PROCEDURE — G8427 DOCREV CUR MEDS BY ELIG CLIN: HCPCS | Performed by: CLINICAL NURSE SPECIALIST

## 2020-04-22 RX ORDER — LISINOPRIL 2.5 MG/1
2.5 TABLET ORAL NIGHTLY
COMMUNITY
Start: 2020-04-03 | End: 2021-11-09 | Stop reason: ALTCHOICE

## 2020-04-22 RX ORDER — FAMOTIDINE 20 MG/1
20 TABLET, FILM COATED ORAL 2 TIMES DAILY
COMMUNITY
Start: 2020-04-03 | End: 2021-11-09 | Stop reason: ALTCHOICE

## 2020-04-22 RX ORDER — SUMATRIPTAN 50 MG/1
50 TABLET, FILM COATED ORAL PRN
COMMUNITY
Start: 2020-04-03 | End: 2022-03-07 | Stop reason: ALTCHOICE

## 2020-04-22 ASSESSMENT — ENCOUNTER SYMPTOMS
ABDOMINAL PAIN: 0
SHORTNESS OF BREATH: 0
NAUSEA: 0
COUGH: 0
WHEEZING: 0
CHEST TIGHTNESS: 1
EYE REDNESS: 0
VOMITING: 0
FACIAL SWELLING: 0

## 2020-04-22 NOTE — PROGRESS NOTES
migraine headaches     Heart disease     leaky valve    Hyperlipidemia     Joint pain     Lipids abnormal        Past Surgical History:   Procedure Laterality Date    CHOLECYSTECTOMY      COLONOSCOPY  06/2011    Dr. Wayne Bradshaw COLONOSCOPY  11/15/2016    Dr General Burns yr recall    COLONOSCOPY N/A 11/15/2016    COLONOSCOPY performed by Jody Belcher DO at [facility]    HYSTERECTOMY      KNEE ARTHROSCOPY Left     KNEE ARTHROSCOPY Right 1/27/2016    KNEE ARTHROSCOPY WITH SUBCHONDROPLASTY  performed [address] Emily Seay MD [address] Right 2017       Family History   Problem Relation Age of Onset    Other Mother         HEART FAILURE    Breast Cancer Mother 48    Heart Disease Father 79        bypass    Prostate Cancer Father 79    Colon Cancer Neg Hx     Colon Polyps Neg Hx     Esophageal Cancer Neg Hx     Liver Cancer Neg Hx     Liver Disease Neg Hx     Rectal Cancer Neg Hx     Stomach Cancer Neg Hx        PHYSICAL EXAMINATION:  [ INSTRUCTIONS:  \"[x]\" Indicates a positive item  \"[]\" Indicates a negative item  -- DELETE ALL ITEMS NOT EXAMINED]  Vital Signs: (As obtained by patient)    Blood pressure- 160/68     Constitutional: [x] Appears well-developed and well-nourished [x] No apparent distress      [] Abnormal-   Mental status  [x] Alert and awake  [x] Oriented to person/place/time [x]Able to follow commands      Eyes:  EOM    [x]  Normal  [] Abnormal-  Sclera  [x]  Normal  [] Abnormal -         Discharge []  None visible  [] Abnormal -    HENT:   [x] Normocephalic, atraumatic.   [] Abnormal   [] Mouth/Throat: Mucous membranes are moist.     External Ears [x] Normal  [] Abnormal-     Neck: [] No visualized mass     Pulmonary/Chest: [x] Respiratory effort normal.  [x] No visualized signs of difficulty breathing or respiratory distress        [] Abnormal-      Musculoskeletal:   [x] Normal gait with no signs of ataxia         [x] Normal range of motion of LVH (left ventricular hypertrophy)  As above    4. Chest tightness  Reviewed recent stress echo that shows no evidence of ischemia. Patient attributes to increased anxiety recently. Seems to resolve and blood pressure is lower. She will be taking her lisinopril on a regular basis at bedtime. She will let us know if the chest tightness persists      Return in about 1 year (around 4/22/2021) for Dr. Ross Arnold.- establish care  Echocardiogram in July  Take lisinopril on a regular basis, 2.5 mg at bedtime    An  electronic signature was used to authenticate this note. --ASHLEY Clemens on 4/22/2020 at 10:42 AM        Pursuant to the emergency declaration under the [facility], [facility] waiver authority and the [employer] Act, this Virtual  Visit was conducted, with patient's consent, to reduce the patient's risk of exposure to COVID-19 and provide continuity of care for an established patient. Services were provided through a video synchronous discussion virtually to substitute for in-person clinic visit.

## 2020-06-01 ENCOUNTER — HOSPITAL ENCOUNTER (OUTPATIENT)
Dept: NON INVASIVE DIAGNOSTICS | Age: 70
Discharge: HOME | End: 2020-06-01
Payer: MEDICARE

## 2020-06-01 LAB
LV EF: 63 %
LVEF MODALITY: NORMAL

## 2020-06-01 PROCEDURE — 93306 TTE W/DOPPLER COMPLETE: CPT

## 2020-08-29 ENCOUNTER — NURSE TRIAGE (OUTPATIENT)
Dept: CALL CENTER | Facility: HOSPITAL | Age: 70
End: 2020-08-29

## 2020-08-29 NOTE — TELEPHONE ENCOUNTER
She began have abdominal pain, She was having pain with urinating. She has been drinking cranberry juice. She is having some abdominal pain, she has a ha also. She had a low grade fever of 99.9. Advised to be seen in a clinic setting today.     Reason for Disposition  • Urinating more frequently than usual (i.e., frequency)    Additional Information  • Negative: Shock suspected (e.g., cold/pale/clammy skin, too weak to stand, low BP, rapid pulse)  • Negative: Sounds like a life-threatening emergency to the triager  • Negative: Followed a genital area injury  • Negative: Followed a genital area injury (penis, scrotum)  • Negative: Vaginal discharge  • Negative: Pus (white, yellow) or bloody discharge from end of penis  • Negative: [1] Taking antibiotic for urinary tract infection (UTI) AND [2] female  • Negative: [1] Taking antibiotic for urinary tract infection (UTI) AND [2] male  • Negative: [1] Discomfort (pain, burning or stinging) when passing urine AND [2] pregnant  • Negative: [1] Discomfort (pain, burning or stinging) when passing urine AND [2] postpartum (from 0 to 6 weeks after delivery)  • Negative: [1] Discomfort (pain, burning or stinging) when passing urine AND [2] female  • Negative: [1] Discomfort (pain, burning or stinging) when passing urine AND [2] male  • Negative: Pain or itching in the vulvar area  • Negative: Pain in scrotum is main symptom  • Negative: Blood in the urine is main symptom  • Negative: Symptoms arising from use of a urinary catheter ([place] or Cou[place])  • Negative: [1] Unable to urinate (or only a few drops) > 4 hours AND     [2] bladder feels very full (e.g., palpable bladder or strong urge to urinate)  • Negative: [1] Decreased urination and [2] drinking very little AND [2] dehydration suspected (e.g., dark urine, no urine > 12 hours, very dry mouth, very lightheaded)  • Negative: Patient sounds very sick or weak to the triager  • Negative: Fever > 100.4 F (38.0 C)  • Negative:  "Side (flank) or lower back pain present  • Negative: [1] Can't control passage of urine (i.e., urinary incontinence) AND [2] new onset (< 2 weeks) or worsening    Answer Assessment - Initial Assessment Questions  1. SYMPTOM: \"What's the main symptom you're concerned about?\" (e.g., frequency, incontinence)      She is having pain with urinating, she has a ha low grade fever and lower abdominal pain.   2. ONSET: \"When did the  *No Answer*  start?\"      Yesterday   3. PAIN: \"Is there any pain?\" If so, ask: \"How bad is it?\" (Scale: 1-10; mild, moderate, severe)      5  4. CAUSE: \"What do you think is causing the symptoms?\"      uti  5. OTHER SYMPTOMS: \"Do you have any other symptoms?\" (e.g., fever, flank pain, blood in urine, pain with urination)      Lower abdominal pain and pain with urnation.   6. PREGNANCY: \"Is there any chance you are pregnant?\" \"When was your last menstrual period?\"      n    Protocols used: URINARY SYMPTOMS-ADULT-AH      "

## 2021-01-18 ENCOUNTER — IMMUNIZATION (OUTPATIENT)
Age: 71
End: 2021-01-18
Payer: MEDICARE

## 2021-01-18 PROCEDURE — 91300 COVID-19, PFIZER VACCINE 30MCG/0.3ML DOSE: CPT | Performed by: FAMILY MEDICINE

## 2021-01-18 PROCEDURE — 0001A PR IMM ADMN SARSCOV2 30MCG/0.3ML DIL RECON 1ST DOSE: CPT | Performed by: FAMILY MEDICINE

## 2021-02-08 ENCOUNTER — IMMUNIZATION (OUTPATIENT)
Age: 71
End: 2021-02-08
Payer: MEDICARE

## 2021-02-08 PROCEDURE — 91300 COVID-19, PFIZER VACCINE 30MCG/0.3ML DOSE: CPT | Performed by: FAMILY MEDICINE

## 2021-02-08 PROCEDURE — 0002A COVID-19, PFIZER VACCINE 30MCG/0.3ML DOSE: CPT | Performed by: FAMILY MEDICINE

## 2021-09-04 ENCOUNTER — HOSPITAL ENCOUNTER (OUTPATIENT)
Dept: GENERAL RADIOLOGY | Age: 71
Discharge: HOME | End: 2021-09-04
Payer: MEDICARE

## 2021-09-04 ENCOUNTER — OFFICE VISIT (OUTPATIENT)
Dept: URGENT CARE | Age: 71
End: 2021-09-04
Payer: MEDICARE

## 2021-09-04 VITALS
OXYGEN SATURATION: 98 % | TEMPERATURE: 97.8 F | SYSTOLIC BLOOD PRESSURE: 132 MMHG | BODY MASS INDEX: 26.7 KG/M2 | HEART RATE: 73 BPM | DIASTOLIC BLOOD PRESSURE: 80 MMHG | RESPIRATION RATE: 18 BRPM | WEIGHT: 146 LBS

## 2021-09-04 DIAGNOSIS — S69.92XA INJURY OF LEFT HAND, INITIAL ENCOUNTER: ICD-10-CM

## 2021-09-04 DIAGNOSIS — S69.92XA INJURY OF LEFT HAND, INITIAL ENCOUNTER: Primary | ICD-10-CM

## 2021-09-04 PROCEDURE — 1123F ACP DISCUSS/DSCN MKR DOCD: CPT | Performed by: NURSE PRACTITIONER

## 2021-09-04 PROCEDURE — G8399 PT W/DXA RESULTS DOCUMENT: HCPCS | Performed by: NURSE PRACTITIONER

## 2021-09-04 PROCEDURE — G8427 DOCREV CUR MEDS BY ELIG CLIN: HCPCS | Performed by: NURSE PRACTITIONER

## 2021-09-04 PROCEDURE — 1036F TOBACCO NON-USER: CPT | Performed by: NURSE PRACTITIONER

## 2021-09-04 PROCEDURE — 3017F COLORECTAL CA SCREEN DOC REV: CPT | Performed by: NURSE PRACTITIONER

## 2021-09-04 PROCEDURE — 99213 OFFICE O/P EST LOW 20 MIN: CPT | Performed by: NURSE PRACTITIONER

## 2021-09-04 PROCEDURE — 73130 X-RAY EXAM OF HAND: CPT

## 2021-09-04 PROCEDURE — G8417 CALC BMI ABV UP PARAM F/U: HCPCS | Performed by: NURSE PRACTITIONER

## 2021-09-04 PROCEDURE — 1090F PRES/ABSN URINE INCON ASSESS: CPT | Performed by: NURSE PRACTITIONER

## 2021-09-04 PROCEDURE — 4040F PNEUMOC VAC/ADMIN/RCVD: CPT | Performed by: NURSE PRACTITIONER

## 2021-09-04 NOTE — PATIENT INSTRUCTIONS
Patient Education        Hand Pain: Care Instructions  Your Care Instructions     Common causes of hand pain are overuse and injuries, such as might happen during sports or home repair projects. Everyday wear and tear, especially as you get older, also can cause hand pain. Most minor hand injuries will heal on their own, and home treatment is usually all you need to do. If you have sudden and severe pain, you may need tests and treatment. Follow-up care is a key part of your treatment and safety. Be sure to make and go to all appointments, and call your doctor if you are having problems. It's also a good idea to know your test results and keep a list of the medicines you take. How can you care for yourself at home? · Take pain medicines exactly as directed. ? If the doctor gave you a prescription medicine for pain, take it as prescribed. ? If you are not taking a prescription pain medicine, ask your doctor if you can take an over-the-counter medicine. · Rest and protect your hand. Take a break from any activity that may cause pain. · Put ice or a cold pack on your hand for 10 to 20 minutes at a time. Put a thin cloth between the ice and your skin. · Prop up the sore hand on a pillow when you ice it or anytime you sit or lie down during the next 3 days. Try to keep it above the level of your heart. This will help reduce swelling. · If your doctor recommends a sling, splint, or elastic bandage to support your hand, wear it as directed. When should you call for help? Call 911 anytime you think you may need emergency care. For example, call if:    · Your hand turns cool or pale or changes color. Call your doctor now or seek immediate medical care if:    · You cannot move your hand.     · Your hand pops, moves out of its normal position, and then returns to its normal position.     · You have signs of infection, such as:  ? Increased pain, swelling, warmth, or redness.   ? Red streaks leading from the sore area. ? Pus draining from a place on your hand. ? A fever.     · Your hand feels numb or tingly. Watch closely for changes in your health, and be sure to contact your doctor if:    · Your hand feels unstable when you try to use it.     · You do not get better as expected.     · You have any new symptoms, such as swelling.     · Bruises from an injury to your hand last longer than 2 weeks. Where can you learn more? Go to [email or web address] and sign in to your [employer] account. Enter R273 in the [employer] Player box to learn more about \"Hand Pain: Care Instructions. \"     If you do not have an account, please click on the \"Sign Up Now\" link. Current as of: October 19, 2020               Content Version: 12.9  © 2909-2773 [employer]. Care instructions adapted under license by [facility] ([facility]). If you have questions about a medical condition or this instruction, always ask your healthcare professional. [address] any warranty or liability for your use of this information.

## 2021-09-04 NOTE — PROGRESS NOTES
SUMAtriptan (IMITREX) 50 MG tablet Take 50 mg by mouth as needed      Cholecalciferol (VITAMIN D) 50 MCG (2000 UT) CAPS capsule Take by mouth      Omega-3 Fatty Acids (FISH OIL) 1000 MG CAPS Take 1,200 mg by mouth daily       calcium carbonate (OSCAL) 500 MG TABS tablet Take 1,200 mg by mouth daily       Probiotic Product (PROBIOTIC-10 PO) Take by mouth      conjugated estrogens (PREMARIN) 0.625 MG/GM vaginal cream Place vaginally as needed Place vaginally daily.  Multiple Vitamins-Minerals (VISION FORMULA PO) Take by mouth      magnesium (MAGNESIUM-OXIDE) 250 MG TABS tablet Take 250 mg by mouth as needed       Fexofenadine HCl (ALLEGRA PO) Take 180 mg by mouth daily as needed       amitriptyline (ELAVIL) 10 MG tablet Take 10 mg by mouth nightly.  Zolpidem Tartrate (AMBIEN PO) Take 5 mg by mouth nightly as needed       famotidine (PEPCID) 20 MG tablet Take 20 mg by mouth 2 times daily      lisinopril (PRINIVIL;ZESTRIL) 2.5 MG tablet Take 2.5 mg by mouth nightly      Turmeric 400 MG CAPS Take by mouth       No current facility-administered medications for this visit.      Allergies   Allergen Reactions    Codeine Nausea And Vomiting       Health Maintenance   Topic Date Due    DTaP/Tdap/Td vaccine (1 - Tdap) Never done    Pneumococcal 65+ years Vaccine (2 of 2 - PPSV23) 11/20/2018    Annual Wellness Visit (AWV)  Never done    Potassium monitoring  06/21/2019    Creatinine monitoring  06/21/2019    Breast cancer screen  03/18/2021    Flu vaccine (1) 09/01/2021    Lipid screen  08/16/2021    Colon cancer screen colonoscopy  11/15/2021    DEXA (modify frequency per FRAX score)  Completed    Shingles Vaccine  Completed    COVID-19 Vaccine  Completed    Hepatitis C screen  Completed    Hepatitis A vaccine  Aged Out    Hepatitis B vaccine  Aged Out    Hib vaccine  Aged Out    Meningococcal (ACWY) vaccine  Aged Out       Subjective:     Review of Systems   Musculoskeletal: Left hand pain and bruising    Neurological: Negative for tingling and numbness.       :Objective      Physical Exam  Vitals and nursing note reviewed. Constitutional:       General: She is not in acute distress. Appearance: Normal appearance. She is well-developed. She is not diaphoretic. HENT:      Head: Normocephalic and atraumatic. Eyes:      Conjunctiva/sclera: Conjunctivae normal.      Pupils: Pupils are equal, round, and reactive to light. Musculoskeletal:      Left hand: Swelling present. Normal range of motion. Normal sensation. Normal capillary refill. Hands:    Skin:     General: Skin is warm and dry. Findings: No rash. Neurological:      Mental Status: She is alert and oriented to person, place, and time. Psychiatric:         Mood and Affect: Mood normal.         Behavior: Behavior normal.       /80   Pulse 73   Temp 97.8 °F (36.6 °C) (Temporal)   Resp 18   Wt 146 lb (66.2 kg)   SpO2 98%   BMI 26.70 kg/m²     :Assessment       Diagnosis Orders   1. Injury of left hand, initial encounter  XR HAND LEFT (MIN 3 VIEWS)       :Plan   Xray:   IMPRESSION:  1. No definite acute osseous abnormality. Rest and tylenol or ibuprofen as needed for pain if tolerated       Orders Placed This Encounter   Procedures    XR HAND LEFT (MIN 3 VIEWS)     Standing Status:   Future     Number of Occurrences:   1     Standing Expiration Date:   9/4/2022     Order Specific Question:   Reason for exam:     Answer:   fell Thursday       No follow-ups on file. No orders of the defined types were placed in this encounter. Patient given educational materials- see patient instructions. Discussed use, benefit, and side effects of prescribedmedications. All patient questions answered. Pt voiced understanding.      Patient Instructions       Patient Education        Hand Pain: Care Instructions  Your Care Instructions     Common causes of hand pain are overuse and injuries, such as might happen during sports or home repair projects. Everyday wear and tear, especially as you get older, also can cause hand pain. Most minor hand injuries will heal on their own, and home treatment is usually all you need to do. If you have sudden and severe pain, you may need tests and treatment. Follow-up care is a key part of your treatment and safety. Be sure to make and go to all appointments, and call your doctor if you are having problems. It's also a good idea to know your test results and keep a list of the medicines you take. How can you care for yourself at home? · Take pain medicines exactly as directed. ? If the doctor gave you a prescription medicine for pain, take it as prescribed. ? If you are not taking a prescription pain medicine, ask your doctor if you can take an over-the-counter medicine. · Rest and protect your hand. Take a break from any activity that may cause pain. · Put ice or a cold pack on your hand for 10 to 20 minutes at a time. Put a thin cloth between the ice and your skin. · Prop up the sore hand on a pillow when you ice it or anytime you sit or lie down during the next 3 days. Try to keep it above the level of your heart. This will help reduce swelling. · If your doctor recommends a sling, splint, or elastic bandage to support your hand, wear it as directed. When should you call for help? Call 911 anytime you think you may need emergency care. For example, call if:    · Your hand turns cool or pale or changes color. Call your doctor now or seek immediate medical care if:    · You cannot move your hand.     · Your hand pops, moves out of its normal position, and then returns to its normal position.     · You have signs of infection, such as:  ? Increased pain, swelling, warmth, or redness. ? Red streaks leading from the sore area. ? Pus draining from a place on your hand. ? A fever.     · Your hand feels numb or tingly.    Watch closely for changes in your health, and be

## 2021-09-13 DIAGNOSIS — Z80.3 FAMILY HISTORY OF BREAST CANCER: Primary | ICD-10-CM

## 2021-11-09 ENCOUNTER — OFFICE VISIT (OUTPATIENT)
Dept: GASTROENTEROLOGY | Age: 71
End: 2021-11-09
Payer: MEDICARE

## 2021-11-09 VITALS
WEIGHT: 146.2 LBS | HEIGHT: 62 IN | HEART RATE: 68 BPM | SYSTOLIC BLOOD PRESSURE: 122 MMHG | BODY MASS INDEX: 26.91 KG/M2 | DIASTOLIC BLOOD PRESSURE: 66 MMHG | OXYGEN SATURATION: 96 %

## 2021-11-09 DIAGNOSIS — Z86.010 HX OF COLONIC POLYPS: ICD-10-CM

## 2021-11-09 DIAGNOSIS — R10.9 ABDOMINAL DISCOMFORT: ICD-10-CM

## 2021-11-09 DIAGNOSIS — R19.5 LOOSE STOOLS: Primary | ICD-10-CM

## 2021-11-09 PROCEDURE — 4040F PNEUMOC VAC/ADMIN/RCVD: CPT | Performed by: NURSE PRACTITIONER

## 2021-11-09 PROCEDURE — G8399 PT W/DXA RESULTS DOCUMENT: HCPCS | Performed by: NURSE PRACTITIONER

## 2021-11-09 PROCEDURE — G8484 FLU IMMUNIZE NO ADMIN: HCPCS | Performed by: NURSE PRACTITIONER

## 2021-11-09 PROCEDURE — G8417 CALC BMI ABV UP PARAM F/U: HCPCS | Performed by: NURSE PRACTITIONER

## 2021-11-09 PROCEDURE — 3017F COLORECTAL CA SCREEN DOC REV: CPT | Performed by: NURSE PRACTITIONER

## 2021-11-09 PROCEDURE — 1090F PRES/ABSN URINE INCON ASSESS: CPT | Performed by: NURSE PRACTITIONER

## 2021-11-09 PROCEDURE — 99204 OFFICE O/P NEW MOD 45 MIN: CPT | Performed by: NURSE PRACTITIONER

## 2021-11-09 PROCEDURE — 1036F TOBACCO NON-USER: CPT | Performed by: NURSE PRACTITIONER

## 2021-11-09 PROCEDURE — 1123F ACP DISCUSS/DSCN MKR DOCD: CPT | Performed by: NURSE PRACTITIONER

## 2021-11-09 PROCEDURE — G8427 DOCREV CUR MEDS BY ELIG CLIN: HCPCS | Performed by: NURSE PRACTITIONER

## 2021-11-09 ASSESSMENT — ENCOUNTER SYMPTOMS
COUGH: 0
CONSTIPATION: 0
ABDOMINAL DISTENTION: 0
VOMITING: 0
CHOKING: 0
NAUSEA: 0
ABDOMINAL PAIN: 1
SHORTNESS OF BREATH: 0
TROUBLE SWALLOWING: 0
ANAL BLEEDING: 0
RECTAL PAIN: 0
BLOOD IN STOOL: 0
DIARRHEA: 1

## 2021-11-09 NOTE — PROGRESS NOTES
Subjective:     Patient ID: Belle Messer is a 78 y.o. female  PCP: Regis Bertrand MD  Referring Provider: No ref. provider found    HPI  Patient presents to the office today with the following complaints: Follow-up      Pt here for c/o diarrhea. She was scheduled for colonoscopy today, but due to symptoms she cancelled. She states diarrhea began last Thursday. Reports some abdominal pain and gas with this. Stools described as loose bowels. This was worse in the am and improve throughout the day. Denies any blood in stool, nausea, vomiting. She states hx post gallbladder diarrhea that would come and go. However, this lasted for several days, frequent stools in am.  Nocturnal stools on Sunday night. She states symptoms are improved this am.  She follows high fiber diet and very active. She has cut dairy from diet due to symptoms. Last Colonoscopy 2016 - normal, 5 year recall. Hx colon polyps  Denies any family hx colon cancer or colon polyps    Assessment:     1. Loose stools    2. Hx of colonic polyps    3. Abdominal discomfort            Plan:   - Daily Probiotics  - Recommend bland diet for one week, the slowly advance diet as tolerated  - Call if symptoms worsen, consider stools studies  - Schedule colonoscopy with random colon biopsy if needed  Instruct on bowel prep. Nothing to eat or drink after midnight the day of the exam.  Unable to drive for 24 hours after the procedure. No aspirin or nonsteroidal anti-inflammatories for 5 days before procedure. I have discussed the benefits, alternatives, and risks (including bleeding, perforation and death)  for pursuing Endoscopy (EGD/Colonscopy/EUS/ERCP) with the patient and they are willing to continue. We also discussed the need for anesthesia, IV access, proper dietary changes, medication changes if necessary, and need for bowel prep (if ordered) prior to their Endoscopic procedure.   They are aware they must have someone accompany them to their scheduled procedure to drive them home - they agree to the above and are willing to continue. Orders  No orders of the defined types were placed in this encounter. Medications  No orders of the defined types were placed in this encounter.         Patient History:     Past Medical History:   Diagnosis Date    Arthritis     osteoarthritis    Colon polyps     FHx: migraine headaches     Heart disease     leaky valve    Hyperlipidemia     Joint pain     Lipids abnormal        Past Surgical History:   Procedure Laterality Date    CHOLECYSTECTOMY      COLONOSCOPY  06/2011    Dr. Lu Cordero COLONOSCOPY  11/15/2016    Dr Bhaskar Sanchez yr recall    COLONOSCOPY N/A 11/15/2016    COLONOSCOPY performed by Stephania Miner DO at [facility]    HYSTERECTOMY      KNEE ARTHROSCOPY Left     KNEE ARTHROSCOPY Right 1/27/2016    KNEE ARTHROSCOPY WITH SUBCHONDROPLASTY  performed by Lizandro Vick MD at [address] [facility] Right 2017       Family History   Problem Relation Age of Onset    Other Mother         HEART FAILURE    Breast Cancer Mother 48    Heart Disease Father 79        bypass    Prostate Cancer Father 79    Colon Cancer Neg Hx     Colon Polyps Neg Hx     Esophageal Cancer Neg Hx     Liver Cancer Neg Hx     Liver Disease Neg Hx     Rectal Cancer Neg Hx     Stomach Cancer Neg Hx        Social History     Socioeconomic History    Marital status:      Spouse name: None    Number of children: None    Years of education: None    Highest education level: None   Occupational History    None   Tobacco Use    Smoking status: Never Smoker    Smokeless tobacco: Never Used   Vaping Use    Vaping Use: Never used   Substance and Sexual Activity    Alcohol use: Yes     Comment:  SOCIALLY Rare     Drug use: No    Sexual activity: None   Other Topics Concern    None   Social History Narrative    None     Social Determinants of Health     Financial Resource Strain:     Difficulty of Paying Living Expenses: Not on file   Food Insecurity:     Worried About Running Out of Food in the Last Year: Not on file    Vincent of Food in the Last Year: Not on file   Transportation Needs:     Lack of Transportation (Medical): Not on file    Lack of Transportation (Non-Medical): Not on file   Physical Activity:     Days of Exercise per Week: Not on file    Minutes of Exercise per Session: Not on file   Stress:     Feeling of Stress : Not on file   Social Connections:     Frequency of Communication with Friends and Family: Not on file    Frequency of Social Gatherings with Friends and Family: Not on file    Attends Christianity Services: Not on file    Active Member of [address] [place] [address] [employer] or Organizations: Not on file    Attends Club or Organization Meetings: Not on file    Marital Status: Not on file   Intimate Partner Violence:     Fear of Current or Ex-Partner: Not on file    Emotionally Abused: Not on file    Physically Abused: Not on file    Sexually Abused: Not on file   Housing Stability:     Unable to Pay for Housing in the Last Year: Not on file    Number of [place] in the Last Year: Not on file    Unstable Housing in the Last Year: Not on file       Current Outpatient Medications   Medication Sig Dispense Refill    SUMAtriptan (IMITREX) 50 MG tablet Take 50 mg by mouth as needed      Cholecalciferol (VITAMIN D) 50 MCG (2000 UT) CAPS capsule Take by mouth daily       Omega-3 Fatty Acids (FISH OIL) 1000 MG CAPS Take 1,200 mg by mouth as needed       calcium carbonate (OSCAL) 500 MG TABS tablet Take 1,200 mg by mouth daily as needed       Probiotic Product (PROBIOTIC-10 PO) Take by mouth daily as needed       conjugated estrogens (PREMARIN) 0.625 MG/GM vaginal cream Place vaginally as needed Place vaginally daily.        Multiple Vitamins-Minerals (VISION FORMULA PO) Take by mouth daily as needed       Fexofenadine HCl (ALLEGRA PO) Take 180 mg by mouth daily as needed       amitriptyline (ELAVIL) 10 MG tablet Take 10 mg by mouth nightly.  Zolpidem Tartrate (AMBIEN PO) Take 5 mg by mouth nightly as needed        No current facility-administered medications for this visit. Allergies   Allergen Reactions    Irbesartan Other (See Comments)    Atorvastatin Other (See Comments)     memory issues  Makes her feel \"awful\"      Codeine Nausea And Vomiting    Lisinopril Other (See Comments)       Review of Systems   Constitutional: Negative for activity change, appetite change, fatigue, fever and unexpected weight change. HENT: Negative for trouble swallowing. Respiratory: Negative for cough, choking and shortness of breath. Cardiovascular: Negative for chest pain. Gastrointestinal: Positive for abdominal pain and diarrhea (\"loose\"). Negative for abdominal distention, anal bleeding, blood in stool, constipation, nausea, rectal pain and vomiting. Allergic/Immunologic: Negative for food allergies. All other systems reviewed and are negative. Objective:     /66   Pulse 68   Ht 5' 2\" (1.575 m)   Wt 146 lb 3.2 oz (66.3 kg)   SpO2 96%   BMI 26.74 kg/m²     Physical Exam  Vitals reviewed. Constitutional:       General: She is not in acute distress. Appearance: She is well-developed. HENT:      Head: Normocephalic and atraumatic. Right Ear: External ear normal.      Left Ear: External ear normal.      Nose: Nose normal.      Comments: Mask on       Mouth/Throat:      Comments: Mask on  Eyes:      Conjunctiva/sclera: Conjunctivae normal.      Pupils: Pupils are equal, round, and reactive to light. Cardiovascular:      Rate and Rhythm: Normal rate and regular rhythm. Heart sounds: Normal heart sounds. No murmur heard. No friction rub. No gallop. Pulmonary:      Effort: Pulmonary effort is normal. No respiratory distress. Breath sounds: Normal breath sounds.    Abdominal:      General: Bowel sounds are normal. There is no distension. Palpations: Abdomen is soft. There is no mass. Tenderness: There is abdominal tenderness (mild) in the periumbilical area. There is no guarding or rebound. Musculoskeletal:         General: Normal range of motion. Cervical back: Normal range of motion and neck supple. Skin:     General: Skin is warm and dry. Findings: No rash. Nails: There is no clubbing. Neurological:      Mental Status: She is alert and oriented to person, place, and time. Gait: Gait normal.   Psychiatric:         Behavior: Behavior normal.         Thought Content:  Thought content normal.

## 2021-11-09 NOTE — PATIENT INSTRUCTIONS
Schedule colonoscopy. No aspirin, ibuprofen, naproxen, fish oil or vitamin E for 5 days before procedure. Do not eat or drink after midnight the day of the procedure. Allowed medications can be taken with a small sip of water. Please review your prep instructions for allowed medications. You will not be able to drive for 24 hours after the procedure due to sedation. You must have a responsible adult, 25 year or older, to accompany you and drive you home the day of your procedure. If you are on blood thinners, clearance from the prescribing physician will be obtained before your procedure is scheduled. If it is determined it is not safe to hold these medications for a short time an alternative procedure for evaluation may be recommended. Risks of colonoscopy include, but are not limited to, perforation, bleeding, and infection, Risk of perforation and bleeding are increased if there is a polyp removed. Anesthesia risks will be reviewed with you before the procedure by a member of the anesthesia department. Your physician may also schedule a follow up appointment with the nurse practitioner to discuss pathology, symptoms or to check if you have had any problems related to your procedure. If you prefer not to return to the office after your procedure please discuss this with your physician on the day of your colonoscopy. The physician will talk with you and/or your family after the procedure is completed. Final recommendations are based on the pathologist report if biopsies or specimens are taken. If polyps are removed during the procedure they will be sent to a pathologist for analysis. Unless you have a follow up appointment scheduled, you will be notified by mail of the pathology results within 4 weeks. If you have not received results after 4 weeks you may call the office to obtain this information. For Colonoscopy:   You will be given specific directions regarding restrictions to diet and bowel prep instructions including laxatives. Please read these instructions one week prior to your scheduled procedure to ensure that you are prepared. If you have any questions regarding these instructions please call our office Mon through Fri from 8:00 am to 4:00 pm.     Follow prep instructions provided for bowel prep. Take all of the bowel prep as directed. If you are having problems with nausea, stop your prep for 30-45 min to allow the nausea to subside before resuming your prep. It is important to drink plenty of fluids throughout the day before taking your laxatives. This will help to protect your kidneys, prevent dehydration and maximize the effect of the bowel prep. Your diet before a colonoscopy bowel preparation is very important to ensure a successful colon exam. It is recommended to consider certain changes to your diet three to four days prior to the procedure. Remember that your bowels need to be completely empty for the exam.    What foods are good to eat? Cut down on heavy solid foods three to four days before the procedure and start introducing lighter meals to your diet. The following food suggestions are a good part of your diet before a colonoscopy bowel preparation.  Light meat that is easily digestible such as chicken (without the skin)    Potatoes without skin    Cheese    Eggs    A light meal of steamed white fish    Light clear soups    Foods and drinks to avoid  Avoid foods that contain too much fiber. Stay clear of dark colored beverages. They can stick to the walls of the digestive tract and make it difficult to differentiate from blood.  Some of these foods are:   Red meat, rice, nuts and vegetables    Milk, other milk based fluids and cream    Most fruit and puddings    Whole grain pasta    Cereals, bran and seeds    Colored beverages, especially those that are red or purple in color    Red colored Jell-O     On the day before the colonoscopy, continue to drink plenty of clear fluids. It is important   to keep yourself hydrated before the exam.     Please follow all instructions as provided for cleansing the bowel. Failure to have an adequately prepped colon may cause you to have incomplete exam with further testing required.      [email or web address]

## 2021-11-10 ENCOUNTER — OFFICE VISIT (OUTPATIENT)
Dept: CARDIOLOGY CLINIC | Age: 71
End: 2021-11-10
Payer: MEDICARE

## 2021-11-10 VITALS
BODY MASS INDEX: 26.68 KG/M2 | WEIGHT: 145 LBS | SYSTOLIC BLOOD PRESSURE: 136 MMHG | HEART RATE: 83 BPM | HEIGHT: 62 IN | DIASTOLIC BLOOD PRESSURE: 80 MMHG

## 2021-11-10 DIAGNOSIS — I10 ESSENTIAL HYPERTENSION: Primary | ICD-10-CM

## 2021-11-10 PROCEDURE — 1123F ACP DISCUSS/DSCN MKR DOCD: CPT | Performed by: INTERNAL MEDICINE

## 2021-11-10 PROCEDURE — 1036F TOBACCO NON-USER: CPT | Performed by: INTERNAL MEDICINE

## 2021-11-10 PROCEDURE — G8417 CALC BMI ABV UP PARAM F/U: HCPCS | Performed by: INTERNAL MEDICINE

## 2021-11-10 PROCEDURE — G8484 FLU IMMUNIZE NO ADMIN: HCPCS | Performed by: INTERNAL MEDICINE

## 2021-11-10 PROCEDURE — 1090F PRES/ABSN URINE INCON ASSESS: CPT | Performed by: INTERNAL MEDICINE

## 2021-11-10 PROCEDURE — 4040F PNEUMOC VAC/ADMIN/RCVD: CPT | Performed by: INTERNAL MEDICINE

## 2021-11-10 PROCEDURE — G8427 DOCREV CUR MEDS BY ELIG CLIN: HCPCS | Performed by: INTERNAL MEDICINE

## 2021-11-10 PROCEDURE — 99214 OFFICE O/P EST MOD 30 MIN: CPT | Performed by: INTERNAL MEDICINE

## 2021-11-10 PROCEDURE — G8399 PT W/DXA RESULTS DOCUMENT: HCPCS | Performed by: INTERNAL MEDICINE

## 2021-11-10 PROCEDURE — 3017F COLORECTAL CA SCREEN DOC REV: CPT | Performed by: INTERNAL MEDICINE

## 2021-11-10 RX ORDER — ATENOLOL 25 MG/1
25 TABLET ORAL DAILY
Qty: 90 TABLET | Refills: 3 | Status: SHIPPED | OUTPATIENT
Start: 2021-11-10 | End: 2022-01-12 | Stop reason: ALTCHOICE

## 2021-11-10 ASSESSMENT — ENCOUNTER SYMPTOMS
BACK PAIN: 0
WHEEZING: 0
EYE DISCHARGE: 0
SHORTNESS OF BREATH: 0
DIARRHEA: 0
CONSTIPATION: 0
VOMITING: 0
COUGH: 0
BLOOD IN STOOL: 0
ABDOMINAL DISTENTION: 0

## 2021-11-10 NOTE — PROGRESS NOTES
[facility]  Cardiology Office Note  [address] [id number]  Phone: [phone]  Fax: [phone]                            Date:  11/10/2021  Patient: Funmi Darby  Age:  78 y. o., 1950    Referral: No ref. provider found      PROBLEM LIST:    Patient Active Problem List    Diagnosis Date Noted    Valvular heart disease 04/22/2020     Priority: Low    Essential hypertension 04/22/2020     Priority: Low    LVH (left ventricular hypertrophy) 04/22/2020     Priority: Low    Chest tightness 04/22/2020     Priority: Low    History of colonic polyps 07/20/2016     Priority: Low     Overview Note:     Updating Deprecated Diagnoses      Intermittent constipation 07/20/2016     Priority: Low     1. Mild aortic regurgitation with mild mitral regurgitation, hyperdynamic left ventricle with mild LVH, prior catheterization 10/29/2015 with patent coronary arteries with severe vessel tortuosity. .  2.  Hypertension likely stress related. PRESENTATION: Funmi Darby is a 78y.o. year old female presents for follow-up visit. She apparently gets echoes every 2 years because of mild aortic regurgitation. She has had a murmur for a long time and this prompted evaluation. Her murmur is however systolic in nature. She is high strung. She tends to worry a lot. Her blood pressure does go up especially during doctor visits. She has tried lisinopril which she tolerated at low dose but developed a cough. She did not tolerate Norvasc apparently. No leg swelling. No chest pain. REVIEW OF SYSTEMS:  Review of Systems   Constitutional: Negative for activity change, fatigue and fever. HENT: Negative for ear pain, hearing loss and tinnitus. Eyes: Negative for discharge and visual disturbance. Respiratory: Negative for cough, shortness of breath and wheezing. Cardiovascular: Negative for chest pain, palpitations and leg swelling.    Gastrointestinal: Negative for abdominal distention, blood in stool, constipation, diarrhea and vomiting. Endocrine: Negative for cold intolerance, heat intolerance, polydipsia and polyuria. Genitourinary: Negative for dysuria and hematuria. Musculoskeletal: Negative for arthralgias, back pain and myalgias. Skin: Negative for pallor and rash. Neurological: Negative for seizures, syncope, weakness and headaches. Psychiatric/Behavioral: Negative for behavioral problems and dysphoric mood. Past Medical History:      Diagnosis Date    Arthritis     osteoarthritis    Colon polyps     FHx: migraine headaches     Heart disease     leaky valve    Hyperlipidemia     Joint pain     Lipids abnormal        Past Surgical History:      Procedure Laterality Date    CHOLECYSTECTOMY      COLONOSCOPY  06/2011    Dr. Grey De La Rosa COLONOSCOPY  11/15/2016    Dr Boyer Mantle yr recall    COLONOSCOPY N/A 11/15/2016    COLONOSCOPY performed by Dimitris Doe DO at [facility]    HYSTERECTOMY      KNEE ARTHROSCOPY Left     KNEE ARTHROSCOPY Right 1/27/2016    KNEE ARTHROSCOPY WITH SUBCHONDROPLASTY  performed by Zbigniew Perez MD at [place] [address] Right 2017       Medications:  Current Outpatient Medications   Medication Sig Dispense Refill    atenolol (TENORMIN) 25 MG tablet Take 1 tablet by mouth daily 90 tablet 3    SUMAtriptan (IMITREX) 50 MG tablet Take 50 mg by mouth as needed      Cholecalciferol (VITAMIN D) 50 MCG (2000 UT) CAPS capsule Take by mouth daily       Omega-3 Fatty Acids (FISH OIL) 1000 MG CAPS Take 1,200 mg by mouth as needed       calcium carbonate (OSCAL) 500 MG TABS tablet Take 1,200 mg by mouth daily as needed       Probiotic Product (PROBIOTIC-10 PO) Take by mouth daily as needed       conjugated estrogens (PREMARIN) 0.625 MG/GM vaginal cream Place vaginally as needed Place vaginally daily.        Multiple Vitamins-Minerals (VISION FORMULA PO) Take by mouth daily as needed       Fexofenadine HCl (ALLEGRA PO) Take 180 mg by mouth daily as needed       amitriptyline (ELAVIL) 10 MG tablet Take 10 mg by mouth nightly.  Zolpidem Tartrate (AMBIEN PO) Take 5 mg by mouth nightly as needed        No current facility-administered medications for this visit. Allergies:  Irbesartan, Atorvastatin, Codeine, and Lisinopril    Past Social History:  Social History     Socioeconomic History    Marital status:      Spouse name: Not on file    Number of children: Not on file    Years of education: Not on file    Highest education level: Not on file   Occupational History    Not on file   Tobacco Use    Smoking status: Never Smoker    Smokeless tobacco: Never Used   Vaping Use    Vaping Use: Never used   Substance and Sexual Activity    Alcohol use: Yes     Comment:  SOCIALLY Rare     Drug use: No    Sexual activity: Not on file   Other Topics Concern    Not on file   Social History Narrative    Not on file     Social Determinants of Health     Financial Resource Strain:     Difficulty of Paying Living Expenses: Not on file   Food Insecurity:     Worried About Running Out of Food in the Last Year: Not on file    Vincent of Food in the Last Year: Not on file   Transportation Needs:     Lack of Transportation (Medical): Not on file    Lack of Transportation (Non-Medical):  Not on file   Physical Activity:     Days of Exercise per Week: Not on file    Minutes of Exercise per Session: Not on file   Stress:     Feeling of Stress : Not on file   Social Connections:     Frequency of Communication with Friends and Family: Not on file    Frequency of Social Gatherings with Friends and Family: Not on file    Attends [employer] Services: Not on file    Active Member of Clubs or Organizations: Not on file    Attends Club or Organization Meetings: Not on file    Marital Status: Not on file   Intimate Partner Violence:     Fear of Current or Ex-Partner: Not on file  Emotionally Abused: Not on file    Physically Abused: Not on file    Sexually Abused: Not on file   Housing Stability:     Unable to Pay for Housing in the Last Year: Not on file    Number of Places Lived in the Last Year: Not on file    Unstable Housing in the Last Year: Not on file       Family History:       Problem Relation Age of Onset    Other Mother         HEART FAILURE    Breast Cancer Mother 48    Heart Disease Father 79        bypass    Prostate Cancer Father 79    Colon Cancer Neg Hx     Colon Polyps Neg Hx     Esophageal Cancer Neg Hx     Liver Cancer Neg Hx     Liver Disease Neg Hx     Rectal Cancer Neg Hx     Stomach Cancer Neg Hx          Physical Examination:  /80   Pulse 83   Ht 5' 2\" (1.575 m)   Wt 145 lb (65.8 kg)   BMI 26.52 kg/m²   Physical Exam  Constitutional:       General: She is not in acute distress. Appearance: She is not diaphoretic. Comments: Mild truncal obesity  Blood pressure right arm sitting 150/80 mmHg, pulse 84 bpm regular   HENT:      Mouth/Throat:      Pharynx: No oropharyngeal exudate. Eyes:      General: No scleral icterus. Right eye: No discharge. Left eye: No discharge. Neck:      Thyroid: No thyromegaly. Vascular: No JVD. Cardiovascular:      Rate and Rhythm: Normal rate and regular rhythm. No extrasystoles are present. Heart sounds: S1 normal and S2 normal. Murmur heard. No systolic murmur is present. No diastolic murmur is present. No friction rub. No gallop. No S3 or S4 sounds. Comments: No JVD  No edema  Systolic murmur appreciated grade 2/6 with A2 well heard  Pulmonary:      Effort: Pulmonary effort is normal. No respiratory distress. Breath sounds: Normal breath sounds. No wheezing or rales. Chest:      Chest wall: No tenderness. Abdominal:      General: Bowel sounds are normal. There is no distension. Palpations: Abdomen is soft. There is no mass. Tenderness:  There is no abdominal tenderness. There is no guarding or rebound. Hernia: No hernia is present. Comments: No palpable organomegaly  No bruits noted  No abnormal midline pulsations felt   Musculoskeletal:         General: Normal range of motion. Skin:     General: Skin is warm. Coloration: Skin is not pale. Findings: No rash. Neurological:      Mental Status: She is alert and oriented to person, place, and time. Cranial Nerves: No cranial nerve deficit. Deep Tendon Reflexes: Reflexes normal.           Labs:   CBC: No results for input(s): WBC, HGB, HCT, PLT in the last 72 hours. BMP:No results for input(s): NA, K, CO2, BUN, CREATININE, LABGLOM, GLUCOSE in the last 72 hours. BNP: No results for input(s): BNP in the last 72 hours. PT/INR: No results for input(s): PROTIME, INR in the last 72 hours. APTT:No results for input(s): APTT in the last 72 hours. CARDIAC ENZYMES:No results for input(s): CKTOTAL, CKMB, CKMBINDEX, TROPONINI in the last 72 hours. FASTING LIPID PANEL:  Lab Results   Component Value Date    HDL 92 08/16/2016    LDLDIRECT 112 01/15/2016    LDLCALC 195 08/16/2016    TRIG 83 08/16/2016     LIVER PROFILE:No results for input(s): AST, ALT, LABALBU in the last 72 hours. Imaging:          ASSESSMENT and PLAN:    73-year-old female patient with past medical history of mild aortic regurgitation, mild mitral regurgitation, hyperdynamic left ventricle with mild LVH, stress related hypertension here for follow-up evaluation. 1.  A large part of her symptoms are probably related to stress as noticed by her very tortuous coronary anatomy, mild LVH with hyperdynamic left ventricle. His systolic murmur is not the murmur of aortic regurgitation. She has been reassured in this regard. She does not require echoes every 2 years as follow-up regarding this. This can be monitored clinically. 2.  Would try a beta-blocker with atenolol 25 mg once daily.   She is very reluctant to take any medications. 3.  Have reviewed her echocardiogram with her and tried to reassure her. She does have sclerotic aortic valve which likely causes her murmur as well as had hyperdynamic left ventricle. 4.  Can follow-up with nurse practitioner in 6 months and with me in 1 year. Orders:  No orders of the defined types were placed in this encounter. Orders Placed This Encounter   Medications    atenolol (TENORMIN) 25 MG tablet     Sig: Take 1 tablet by mouth daily     Dispense:  90 tablet     Refill:  3             Return for NP 6 mths; me 1 year. Electronically signed by Ana Paula Marsh MD on 11/10/2021 at [address] [place] [address] [facility]      Thisdictation was generated by voice recognition computer software. Although all attempts are made to edit the dictation for accuracy, there may be errors in the transcription that are not intended.

## 2021-11-11 ENCOUNTER — TELEPHONE (OUTPATIENT)
Dept: CARDIOLOGY CLINIC | Age: 71
End: 2021-11-11

## 2021-11-11 NOTE — TELEPHONE ENCOUNTER
Patient called stating she has only taken one atenolol but she is so dizzy. She can't lean over without having to hold onto something when she comes back up or she will fall. HR 60's. She is unsure on BP.  checked it and thinks he heard it at 140 but definitely heard it at 120. She didn't provide diastolic.

## 2021-11-11 NOTE — TELEPHONE ENCOUNTER
PT called back 30 min later and states her BP is higher and the dizziness isn't getting any better. Pt advised we are asking Dr. Lisette Cox about this and will call her back when we get an answer. /88.

## 2021-11-12 NOTE — TELEPHONE ENCOUNTER
Ask her to monitor her blood pressures. Would continue to trial atenolol for at least a week and reassess. Lightheadedness with an elevated blood pressure after taking atenolol should not be related to atenolol.

## 2021-11-15 NOTE — TELEPHONE ENCOUNTER
Pt states she refuses to take atenolol. She will monitor her BP. Pls advise would you like pt to try another BP med.

## 2021-11-20 ENCOUNTER — NURSE TRIAGE (OUTPATIENT)
Dept: CALL CENTER | Facility: HOSPITAL | Age: 71
End: 2021-11-20

## 2021-11-20 RX ORDER — CIPROFLOXACIN 500 MG/1
500 TABLET, FILM COATED ORAL EVERY 12 HOURS SCHEDULED
Status: DISCONTINUED | OUTPATIENT
Start: 2021-11-20 | End: 2021-11-22

## 2021-11-20 NOTE — TELEPHONE ENCOUNTER
I have had diarrhea for 2 1/2 weeks had stool spec. Tuesday, resulted today enteroaggregative Ecoli positve, I will be taking care of new Grand baby on Monday, do I need antibiotic, I cannot wait till Monday. Dr. Gibbons was called by Triage Nurse Dr. Gibbons  ordered Cipro 500 mg po bid for 7 days no refill.Pharmacy called and order put in . Spoke with Pharmacist they do not have Cipro, in stock, [facility] called next. PharmacistPraveen read back and pt. called    Reason for Disposition  • [1] Caller has URGENT medicine question about med that PCP or specialist prescribed AND [2] triager unable to answer question    Additional Information  • Negative: [1] Intentional drug overdose AND [2] suicidal thoughts or ideas  • Negative: Drug overdose and triager unable to answer question  • Negative: Caller requesting information unrelated to medicine  • Negative: Caller requesting information about COVID-19 Vaccine  • Negative: Caller requesting information about Emergency Contraception  • Negative: Caller requesting information about Combined Birth Control Pills  • Negative: Caller requesting information about Progestin Birth Control Pills  • Negative: Caller requesting information about Post-Op pain or medicines  • Negative: Caller requesting a prescription antibiotic (such as Penicillin) for Strep throat and has a positive culture result  • Negative: Caller requesting a prescription anti-viral med (such as Tamiflu) and has influenza (flu)  symptoms  • Negative: Immunization reaction suspected  • Negative: Rash while taking a medicine or within 3 days of stopping it  • Negative: [1] Asthma and [2] having symptoms of asthma (cough, wheezing, etc.)  • Negative: [1] Symptom of illness (e.g., headache, abdominal pain, earache, vomiting) AND [2] more than mild  • Negative: Breastfeeding questions about mother's medicines and diet  • Negative: MORE THAN A DOUBLE DOSE of a prescription or over-the-counter (OTC) drug  • Negative:  "[1] DOUBLE DOSE (an extra dose or lesser amount) of prescription drug AND [2] any symptoms (e.g., dizziness, nausea, pain, sleepiness)  • Negative: [1] DOUBLE DOSE (an extra dose or lesser amount) of over-the-counter (OTC) drug AND [2] any symptoms (e.g., dizziness, nausea, pain, sleepiness)  • Negative: Took another person's prescription drug  • Negative: [1] DOUBLE DOSE (an extra dose or lesser amount) of prescription drug AND [2] NO symptoms (Exception: a double dose of antibiotics)  • Negative: Diabetes drug error or overdose (e.g., took wrong type of insulin or took extra dose)  • Negative: [1] Prescription refill request for ESSENTIAL medicine (i.e., likelihood of harm to patient if not taken) AND [2] triager unable to refill per department policy  • Negative: [1] Prescription not at pharmacy AND [2] was prescribed by PCP recently (Exception: triager has access to EMR and prescription is recorded there. Go to Home Care and confirm for pharmacy.)  • Negative: [1] Pharmacy calling with prescription question AND [2] triager unable to answer question    Answer Assessment - Initial Assessment Questions  1. NAME of MEDICATION: \"What medicine are you calling about?\"      Needing antibiotic for positive stool  2. QUESTION: \"What is your question?\" (e.g., medication refill, side effect)      Can I get antibiotic called in please for positive stool  3. PRESCRIBING HCP: \"Who prescribed it?\" Reason: if prescribed by specialist, call should be referred to that group.      Dr. Gibbons  4. SYMPTOMS: \"Do you have any symptoms?\"      Diarrhea 2 weeks positive stool  5. SEVERITY: If symptoms are present, ask \"Are they mild, moderate or severe?\"      Mild moderate  6. PREGNANCY:  \"Is there any chance that you are pregnant?\" \"When was your last menstrual period?\"      no    Protocols used: MEDICATION QUESTION CALL-ADULT-      "

## 2021-11-24 ENCOUNTER — PATIENT MESSAGE (OUTPATIENT)
Dept: GASTROENTEROLOGY | Age: 71
End: 2021-11-24

## 2021-11-24 NOTE — TELEPHONE ENCOUNTER
From: Jose Maravilla Block  To: ASHLEY Dowell NP  Sent: 11/24/2021 8:11 AM CST  Subject: Davion Salinas    The stool sample came back positive for E coli Enteroaggregative. Dr. Halie Hull put me on Cipro and I'm much better. I'm back to where I was back in the spring. I'm thinking I was fighting this since the spring and the bouts of diarrhea that lasted one to 2 days and then got better were also caused by this and not my lack of a gallbladder.  Rossy Ca

## 2021-12-09 ENCOUNTER — DOCUMENTATION (OUTPATIENT)
Dept: GENETICS | Facility: HOSPITAL | Age: 71
End: 2021-12-09

## 2021-12-09 ENCOUNTER — CLINICAL SUPPORT (OUTPATIENT)
Dept: ONCOLOGY | Facility: HOSPITAL | Age: 71
End: 2021-12-09

## 2021-12-09 ENCOUNTER — ANESTHESIA EVENT (OUTPATIENT)
Dept: OPERATING ROOM | Age: 71
End: 2021-12-09

## 2021-12-09 NOTE — PROGRESS NOTES
Gayatri Bianchi, a 71-year-old female, was referred for genetic counseling due to a family history of breast cancer.   Genetic counseling was provided via telehealth.  Ms. Bianchi was 16 years old at menarche and had her first child at age 31.  She is postmenopausal and has had a hysterectomy and unilateral salping-oophorectomy.  She was on HRT until 10 years ago, and now uses a topical estrogen cream at needed.  .   She was interested in discussing her risk for a hereditary cancer syndrome.   Ms. Bianchi decided to pursue comprehensive genetic testing to evaluate her risk of cancer, therefore the CancerNext panel was ordered through [employer] which analyzes 36 genes associated with an increased cancer risk. The genes on this panel include APC, AURELIO, AXIN2, BARD1, BMPR1A, BRCA1, BRCA2, BRIP1, CDH1, CDK4, CDKN2A, CHEK2, DICER1, EPCAM, GREM1, HOXB13, MLH1, MRE11A, MSH2, MSH3, MSH6, MUTYH, NBN, NF1, NTHL1, PALB2, PMS2, POLD1, POLE, PTEN, RAD51C, RAD51D, RECQL, SMAD4, SMARCA4, STK11, and TP53.  Results are expected in approximately 2-3 weeks.     PERTINENT FAMILY HISTORY: (See attached pedigree)   Mother: Breast cancer, 48  Father:  Metastatic prostate cancer, 70s    RISK ASSESSMENT:  Ms. Bianchi’s family history of breast cancer and metastatic prostate cancer raised the question of a hereditary cancer syndrome.  Ms. Bianchi clearly meets NCCN guidelines criteria for BRCA1/2 testing based on family history of metastatic prostate cancer in a first degree relative.  Based on the presence of other clinically significant breast cancer related genes, the standard approach to hereditary cancer genetic testing at this time is via multi-gene panel, which evaluates for BRCA1/2 as well as several additional genes associated with a hereditary risk for breast cancer and other cancers. If genetic testing is negative, management should be guided by a family history-based risk assessment.     GENETIC COUNSELING:  We reviewed the family  history information in detail. Cases of cancer follow three general patterns: sporadic, familial, and hereditary.  While most cancer is sporadic, some cases appear to occur in family clusters.  These cases are said to be familial and account for 10-20% of cancer cases.  Familial cases may be due to a combination of shared genes and environmental factors among family members.  In even fewer cases (5-10%), the risk for cancer is inherited, and the genes responsible for the increased cancer risk are known.       Family histories typical of hereditary cancer syndromes usually include multiple first- and second-degree relatives diagnosed with cancer types that define a syndrome.  These cases tend to be diagnosed at younger-than-expected ages and can be bilateral or multifocal.  The cancer in these families follows an autosomal dominant inheritance pattern, which indicates the likely presence of a mutation in a cancer susceptibility gene.  Children and siblings of an individual believed to carry this mutation have a 50% chance of inheriting that mutation, thereby inheriting the increased risk to develop cancer.  These mutations can be passed down from the maternal or the paternal lineage.     Hereditary breast cancer accounts for 5-10% of all cases of breast cancer.  A significant proportion (50%) of hereditary breast cancer can be attributed to mutations in the BRCA1 and BRCA2 genes.  Mutations in these genes confer an increased risk for breast cancer, ovarian cancer, male breast cancer, prostate cancer, and pancreatic cancer.  Women with a BRCA1 or BRCA2 mutation have up to an 87% lifetime risk of breast cancer and up to a 44% risk of ovarian cancer.  There are other clinically significant breast cancer related genes in addition to BRCA1/2, including PALB2, AURELIO, and CHEK2. There are other, more rare, hereditary cancer syndromes. Based on Ms. Bianchi’s family history and her desire to get more information regarding her  personal risks, testing was pursued through a multigene panel evaluating several other genes known to increase the risk for cancer.     GENETIC TESTING:  The risks, benefits, and limitations of genetic testing and implications for clinical management following testing were reviewed.  DNA test results can influence decisions regarding screening and prevention.  Genetic testing can have significant psychological implications for both individuals and families. Also discussed was the possibility of employment and insurance discrimination based on genetic test results and the laws in place to prevent this, as well as the limitations of these laws.       We discussed panel testing, which would involve testing 36 genes associated with increased cancer risk. The implications of a positive or negative test result were discussed.  We also discussed the importance of testing on an affected relative. In general, a negative genetic test result is most informative if a mutation has first been established in an affected member of the family.  In cases where an affected individual is not available or interested in testing, it is appropriate to offer testing to an unaffected individual. We discussed the possibility that, in some cases, genetic test results may be ambiguous due to the identification of a genetic variant. These variants may or may not be associated with an increased cancer risk. With multigene panel testing, it is not uncommon for a variant of uncertain significance (VUS) to be identified.  If a VUS is identified, testing family members is typically not recommended and screening recommendations are made based on the family history.  The laboratories that perform genetic testing work to reclassify the VUS and send out an amended report if and when a VUS is reclassified.  The majority of variant findings are ultimately reclassified to a negative result. Given her family history, a negative test result does not eliminate  all cancer risk, although the risk would not be as high as it would with positive genetic testing.      PLAN: Genetic testing via the CancerNext panel through [employer] was ordered.  We will contact Ms. Bianchi with her results once they are received.      Brittney Aldrich MS, [facility], [facility]  Licensed Certified Genetic Counselor

## 2021-12-14 ENCOUNTER — HOSPITAL ENCOUNTER (OUTPATIENT)
Age: 71
Setting detail: OUTPATIENT SURGERY
Discharge: HOME | End: 2021-12-14
Attending: INTERNAL MEDICINE | Admitting: INTERNAL MEDICINE
Payer: MEDICARE

## 2021-12-14 ENCOUNTER — ANESTHESIA (OUTPATIENT)
Dept: OPERATING ROOM | Age: 71
End: 2021-12-14

## 2021-12-14 ENCOUNTER — APPOINTMENT (OUTPATIENT)
Dept: OPERATING ROOM | Age: 71
End: 2021-12-14

## 2021-12-14 ENCOUNTER — HOSPITAL ENCOUNTER (OUTPATIENT)
Age: 71
Setting detail: SPECIMEN
Discharge: HOME | End: 2021-12-14
Payer: MEDICARE

## 2021-12-14 VITALS
WEIGHT: 143 LBS | RESPIRATION RATE: 18 BRPM | HEIGHT: 62 IN | DIASTOLIC BLOOD PRESSURE: 56 MMHG | SYSTOLIC BLOOD PRESSURE: 108 MMHG | TEMPERATURE: 98 F | HEART RATE: 71 BPM | BODY MASS INDEX: 26.31 KG/M2 | OXYGEN SATURATION: 95 %

## 2021-12-14 VITALS — OXYGEN SATURATION: 99 % | SYSTOLIC BLOOD PRESSURE: 95 MMHG | DIASTOLIC BLOOD PRESSURE: 57 MMHG

## 2021-12-14 PROCEDURE — 88305 TISSUE EXAM BY PATHOLOGIST: CPT

## 2021-12-14 PROCEDURE — 45380 COLONOSCOPY AND BIOPSY: CPT | Performed by: INTERNAL MEDICINE

## 2021-12-14 PROCEDURE — 45380 COLONOSCOPY AND BIOPSY: CPT

## 2021-12-14 RX ORDER — LIDOCAINE HYDROCHLORIDE 10 MG/ML
INJECTION, SOLUTION INFILTRATION; PERINEURAL PRN
Status: DISCONTINUED | OUTPATIENT
Start: 2021-12-14 | End: 2021-12-14 | Stop reason: SDUPTHER

## 2021-12-14 RX ORDER — PROPOFOL 10 MG/ML
INJECTION, EMULSION INTRAVENOUS PRN
Status: DISCONTINUED | OUTPATIENT
Start: 2021-12-14 | End: 2021-12-14 | Stop reason: SDUPTHER

## 2021-12-14 RX ORDER — SODIUM CHLORIDE 9 MG/ML
INJECTION, SOLUTION INTRAVENOUS CONTINUOUS
Status: DISCONTINUED | OUTPATIENT
Start: 2021-12-14 | End: 2021-12-14 | Stop reason: HOSPADM

## 2021-12-14 RX ADMIN — PROPOFOL 50 MG: 10 INJECTION, EMULSION INTRAVENOUS at 10:22

## 2021-12-14 RX ADMIN — SODIUM CHLORIDE: 9 INJECTION, SOLUTION INTRAVENOUS at 09:24

## 2021-12-14 RX ADMIN — LIDOCAINE HYDROCHLORIDE 50 MG: 10 INJECTION, SOLUTION INFILTRATION; PERINEURAL at 10:02

## 2021-12-14 RX ADMIN — PROPOFOL 50 MG: 10 INJECTION, EMULSION INTRAVENOUS at 10:17

## 2021-12-14 RX ADMIN — PROPOFOL 50 MG: 10 INJECTION, EMULSION INTRAVENOUS at 10:02

## 2021-12-14 RX ADMIN — PROPOFOL 50 MG: 10 INJECTION, EMULSION INTRAVENOUS at 10:10

## 2021-12-14 NOTE — OP NOTE
Patient: Evonnie Hashimoto : 1950  Med Rec#: [id number] Acc#: [id number]   Primary Care Provider Arjun Thakkar MD    Date of Procedure:  2021    Endoscopist: Fe Alonso MD, MD    Referring Provider: Arjun Thakkar MD,     Operation Performed: Colonoscopy up to the cecum with cold biopsies    Indications:   1. Loose stools    2. Hx of colonic polyps    3. Abdominal discomfort      Anesthesia:  Sedation was administered by anesthesia who monitored the patient during the procedure. I met with Evonnie Hashimoto prior to procedure. We discussed the procedure itself, and I have discussed the risks of endoscopy (including-- but not limited to-- pain, discomfort, bleeding potentially requiring second endoscopic procedure and/or blood transfusion, organ perforation requiring operative repair, damage to organs near the colon, infection, aspiration, cardiopulmonary/allergic reaction), benefits, indications to endoscopy. Additionally, we discussed options other than colonoscopy. The patient expressed understanding. All questions answered. The patient decided to proceed with the procedure. Signed informed consent was placed on the chart. Blood Loss: minimal    Withdrawal time: More than 10 minutes  Bowel Prep: Fair  with small amounts of thick solid and semisolid stool and moderate amount of thick, opaque liquid scattered in patchy segments, especially in the vicinity of diverticulosis, throughout the colon obscuring the underlying mucosa. Lesions including polyps may have been missed. Complications: no immediate complications    DESCRIPTION OF PROCEDURE:     A time out was performed. After written informed consent was obtained, the patient was placed in the left lateral position. The perianal area was inspected, and a digital rectal exam was performed. A rectal exam was performed: normal tone, no palpable lesions.  At this point, a forward viewing Olympus colonoscope was inserted into the anus and carefully advanced to the cecum. The cecum was identified by the ileocecal valve and the appendiceal orifice. The colonoscope was then slowly withdrawn with careful inspection of the mucosa in a linear and circumferential fashion. The scope was retroflexed in the rectum. Suction was utilized during the procedure to remove as much air as possible from the bowel. The colonoscope was removed from the patient, and the procedure was terminated. Findings are listed below. Findings:     NO large polyps or masses or strictures or colitis. Random cold biopsies were taken to check for microscopic colitis. Suboptimal exam due to prep quality as described above. Moderate to severe diverticulosis in the left colon may be a contributing factor to some of her lower GI symptoms  Internal hemorrhoids-Grade 1 without any bleeding stigmata  Where it was clearly visible, the mucosa appeared normal throughout the entire examined colon  Retroflexion in the rectum was otherwise normal and revealed no further abnormalities     Recommendations:  1. Repeat colonoscopy: pending pathology -in 3 years for colorectal cancer screening due to her prep quality today. 2. Await biopsy results-you will receive a letter with your results within 7-10 days    - Resume previous meds and diet; may add probiotics or probiotic yogurt once daily to her regimen  - GI clinic f/u 4-6 weeks with Ms. Eduard Najera scheduled f/u appts with other MDs     - NO ASA/NSAIDs x 2 weeks    Findings and recommendations were discussed w/ the patient. A copy of the images was provided.     Jess Jasmine MD, MD  12/14/2021  9:57 AM

## 2021-12-14 NOTE — ANESTHESIA PRE PROCEDURE
Department of Anesthesiology  Preprocedure Note       Name:  Alka Cochran   Age:  73 y.o.  :  1950                                          MRN:  [id number]         Date:  2021      Surgeon: Billy Ponce):  Danny Eaton MD    Procedure: Procedure(s):  COLONOSCOPY    Medications prior to admission:   Prior to Admission medications    Medication Sig Start Date End Date Taking? Authorizing Provider   atenolol (TENORMIN) 25 MG tablet Take 1 tablet by mouth daily 11/10/21   Fab Zarate MD   SUMAtriptan (IMITREX) 50 MG tablet Take 50 mg by mouth as needed 4/3/20   Historical Provider, MD   Cholecalciferol (VITAMIN D) 50 MCG (2000 UT) CAPS capsule Take by mouth daily     Historical Provider, MD   Omega-3 Fatty Acids (FISH OIL) 1000 MG CAPS Take 1,200 mg by mouth as needed     Historical Provider, MD   calcium carbonate (OSCAL) 500 MG TABS tablet Take 1,200 mg by mouth daily as needed     Historical Provider, MD   Probiotic Product (PROBIOTIC-10 PO) Take by mouth daily as needed     Historical Provider, MD   conjugated estrogens (PREMARIN) 0.625 MG/GM vaginal cream Place vaginally as needed Place vaginally daily. Historical Provider, MD   Multiple Vitamins-Minerals (VISION FORMULA PO) Take by mouth daily as needed     Historical Provider, MD   Fexofenadine HCl (ALLEGRA PO) Take 180 mg by mouth daily as needed     Historical Provider, MD   amitriptyline (ELAVIL) 10 MG tablet Take 10 mg by mouth nightly. Historical Provider, MD   Zolpidem Tartrate (AMBIEN PO) Take 5 mg by mouth nightly as needed     Historical Provider, MD       Current medications:    No current facility-administered medications for this visit. No current outpatient medications on file.      Facility-Administered Medications Ordered in Other Visits   Medication Dose Route Frequency Provider Last Rate Last Admin    0.9 % sodium chloride infusion   IntraVENous Continuous Danny Eaton  mL/hr at 12/14/21 2044 New Bag at 12/14/21 0924       Allergies: Allergies   Allergen Reactions    Irbesartan Other (See Comments)    Atorvastatin Other (See Comments)     memory issues  Makes her feel \"awful\"      Codeine Nausea And Vomiting    Lisinopril Other (See Comments)       Problem List:    Patient Active Problem List   Diagnosis Code    History of colonic polyps Z86.010    Intermittent constipation K59.09    Valvular heart disease I38    Essential hypertension I10    LVH (left ventricular hypertrophy) I51.7    Chest tightness R07.89       Past Medical History:        Diagnosis Date    Arthritis     osteoarthritis    Colon polyps     FHx: migraine headaches     Heart disease     leaky valve    Hyperlipidemia     Joint pain     Lipids abnormal        Past Surgical History:        Procedure Laterality Date    CHOLECYSTECTOMY      COLONOSCOPY  06/2011    Dr. Carolina Knutson COLONOSCOPY  11/15/2016    Dr Joy Borja yr recall    COLONOSCOPY N/A 11/15/2016    COLON[facility]COPY [facility] Bethany Zaidi [facility] at [facility]    HYSTERECTOMY      KNEE ARTHROSCOPY Left     KNEE ARTHROSCOPY Right 1/27/2016    KNEE ARTHROSCOPY WITH SUBCHONDROPLASTY  performed by Phil Alex MD at [address] [facility] Right 2017       Social History:    Social History     Tobacco Use    Smoking status: Never Smoker    Smokeless tobacco: Never Used   Substance Use Topics    Alcohol use: Yes     Comment:  SOCIALLY Rare                                 Counseling given: Not Answered      Vital Signs (Current): There were no vitals filed for this visit.                                            BP Readings from Last 3 Encounters:   12/14/21 (!) 115/58   11/10/21 136/80   11/09/21 122/66       NPO Status:                                                                                 BMI:   Wt Readings from Last 3 Encounters:   12/14/21 143 lb (64.9 kg)   11/10/21 145 lb (65.8 kg)   11/09/21 146 lb 3.2 oz (66.3 kg) There is no height or weight on file to calculate BMI.    CBC:   Lab Results   Component Value Date    WBC 3.8 08/23/2016    RBC 4.63 08/23/2016    HGB 14.6 08/23/2016    HCT 42.1 08/23/2016    HCT 39.0 06/20/2011    MCV 90.9 08/23/2016    RDW 12.2 08/23/2016     08/23/2016     06/20/2011       CMP:   Lab Results   Component Value Date     08/16/2016    K 4.1 08/16/2016     08/16/2016    CO2 25 08/16/2016    BUN 25 08/16/2016    CREATININE 0.9 08/16/2016    LABGLOM >60 08/16/2016    GLUCOSE 107 08/16/2016    PROT 7.1 08/16/2016    CALCIUM 9.6 08/16/2016    BILITOT 0.3 08/16/2016    ALKPHOS 94 08/16/2016    AST 37 08/16/2016    ALT 47 08/16/2016       POC Tests: No results for input(s): POCGLU, POCNA, POCK, POCCL, POCBUN, POCHEMO, POCHCT in the last 72 hours. Coags: No results found for: PROTIME, INR, APTT    HCG (If Applicable): No results found for: PREGTESTUR, PREGSERUM, HCG, HCGQUANT     ABGs: No results found for: PHART, PO2ART, EWF7UTA, SUB2BLP, BEART, I8QLPZFS     Type & Screen (If Applicable):  No results found for: LABABO, [address]    Anesthesia Evaluation  Patient summary reviewed and Nursing notes reviewed no history of anesthetic complications:   Airway: Mallampati: I  TM distance: >3 FB   Neck ROM: full   Dental:          Pulmonary: breath sounds clear to auscultation                            ROS comment: No tob   Cardiovascular:Negative CV ROS    (+) hypertension:, valvular problems/murmurs (very mild aortic valve Insuff. echo every 2 years):, murmur, hyperlipidemia        Rhythm: regular                      Neuro/Psych:   Negative Neuro/Psych ROS  (+) headaches: migraine headaches, psychiatric history:            GI/Hepatic/Renal:   (+) bowel prep,          ROS comment: etoh social .   Endo/Other:                     Abdominal:             Vascular:           Other Findings:               Anesthesia Plan      general and TIVA     ASA 2       Induction: intravenous. Anesthetic plan and risks discussed with patient.                 Vomited am dose of prep at [facility], APRN - KATY   12/14/2021

## 2021-12-14 NOTE — H&P
Patient Name: Latasha Dalton  : 1950  MRN: [id number]  DATE: 21    Allergies: Allergies   Allergen Reactions    Irbesartan Other (See Comments)    Atorvastatin Other (See Comments)     memory issues  Makes her feel \"awful\"      Codeine Nausea And Vomiting    Lisinopril Other (See Comments)        ENDOSCOPY  History and Physical    Procedure:    [x] Diagnostic Colonoscopy       [] Screening Colonoscopy  [] EGD      [] ERCP      [] EUS       [] Other    [x] Previous office notes/History and Physical reviewed from the patients chart. Please see EMR for further details of HPI. I have examined the patient's status immediately prior to the procedure and:      Indications/HPI:   1. Loose stools    2. Hx of colonic polyps    3. Abdominal discomfort      []Abdominal Pain   []Cancer- GI/Lung     []Fhx of colon CA/polyps  []History of Polyps  []Barretts            []Melena  []Abnormal Imaging              []Dysphagia              []Persistent Pneumonia   []Anemia                            []Food Impaction        []History of Polyps  [] GI Bleed             []Pulmonary nodule/Mass   []Change in bowel habits []Heartburn/Reflux  []Rectal Bleed (BRBPR)  []Chest Pain - Non Cardiac []Heme (+) Stool []Ulcers  []Constipation  []Hemoptysis  []Varices  []Diarrhea  []Hypoxemia    []Nausea/Vomiting   []Screening   []Crohns/Colitis  []Other:     Anesthesia:   [x] MAC [] Moderate Sedation   [] General   [] None     ROS: 12 pt Review of Symptoms was negative unless mentioned above    Medications:   Prior to Admission medications    Medication Sig Start Date End Date Taking?  Authorizing Provider   atenolol (TENORMIN) 25 MG tablet Take 1 tablet by mouth daily 11/10/21   Nikolas Sandy MD   SUMAtriptan (IMITREX) 50 MG tablet Take 50 mg by mouth as needed 4/3/20   Historical Provider, MD   Cholecalciferol (VITAMIN D) 50 MCG ( UT) CAPS capsule Take by mouth daily     Historical Provider, MD   Omega-3 Fatty Acids ( [facility]) 1000 MG CAPS Take 1,200 mg by mouth as needed     Historical Provider, MD   calcium carbonate (OSCAL) 500 MG TABS tablet Take 1,200 mg by mouth daily as needed     Historical Provider, MD   Probiotic Product (PROBIOTIC-10 PO) Take by mouth daily as needed     Historical Provider, MD   conjugated estrogens (PREMARIN) 0.625 MG/GM vaginal cream Place vaginally as needed Place vaginally daily. Historical Provider, MD   Multiple Vitamins-Minerals (VISION FORMULA PO) Take by mouth daily as needed     Historical Provider, MD   Fexofenadine HCl (ALLEGRA PO) Take 180 mg by mouth daily as needed     Historical Provider, MD   amitriptyline (ELAVIL) 10 MG tablet Take 10 mg by mouth nightly.       Historical Provider, MD   Zolpidem Tartrate (AMBIEN PO) Take 5 mg by mouth nightly as needed     Historical Provider, MD       Past Medical History:  Past Medical History:   Diagnosis Date    Arthritis     osteoarthritis    Colon polyps     FHx: migraine headaches     Heart disease     leaky valve    Hyperlipidemia     Joint pain     Lipids abnormal        Past Surgical History:  Past Surgical History:   Procedure Laterality Date    CHOLECYSTECTOMY      COLONOSCOPY  06/2011    Dr. Janneth Raines COLONOSCOPY  11/15/2016    Dr Hubbard yr recall    COLONOSCOPY N/A 11/15/2016    COLONOSCOPY performed by Rajeev Degroot DO at [facility]    HYSTERECTOMY      KNEE ARTHROSCOPY Left     KNEE ARTHROSCOPY Right 1/27/2016    KNEE ARTHROSCOPY WITH SUBCHONDROPLASTY  performed by Yanick Gallagher MD at [address] [place] [address] Right 2017       Social History:  Social History     Tobacco Use    Smoking status: Never Smoker    Smokeless tobacco: Never Used   Vaping Use    Vaping Use: Never used   Substance Use Topics    Alcohol use: Yes     Comment:  SOCIALLY Rare     Drug use: No       Vital Signs:   Vitals:    12/14/21 0918   BP: (!) 115/58   Pulse: 80   Resp: 12   Temp: 98 °F (36.7 °C)   SpO2: 96% Physical Exam:  Cardiac:  [x]WNL  []Comments:  Pulmonary:  [x]WNL   []Comments:  Neuro/Mental Status:  [x]WNL  []Comments:  Abdominal:  [x]WNL    []Comments:  Other:   []WNL  []Comments:    Informed Consent:  The risks and benefits of the procedure have been discussed with either the patient or if they cannot consent, their representative. Assessment:  Patient examined and appropriate for planned sedation and procedure. Plan:  Proceed with planned sedation and procedure as above.          Aranza Otero MD

## 2021-12-14 NOTE — ANESTHESIA POSTPROCEDURE EVALUATION
Department of Anesthesiology  Postprocedure Note    Patient: Ana Paul  MRN: [id number]  YOB: 1950  Date of evaluation: 12/14/2021  Time:  10:28 AM     Procedure Summary     Date: 12/14/21 Room / Location: [facility] ENDO 02 / [address] [place] [address]    Anesthesia Start: 1000 Anesthesia Stop: 2945    Procedure: COLONOSCOPY WITH BIOPSY (N/A Abdomen) Diagnosis: (DIARRHEA, HX POLYPS)    Surgeons: Dylon Gabriel MD Responsible Provider: ASHLEY Chow CRNA    Anesthesia Type: general, TIVA ASA Status: 2          Anesthesia Type: general, TIVA    Laura Phase I:      Laura Phase II:      Last vitals: Reviewed and per EMR flowsheets.        Anesthesia Post Evaluation    Patient location during evaluation: bedside  Patient participation: complete - patient participated  Level of consciousness: awake and alert  Pain score: 0  Airway patency: patent  Nausea & Vomiting: no nausea and no vomiting  Complications: no  Cardiovascular status: blood pressure returned to baseline  Respiratory status: acceptable  Hydration status: stable

## 2021-12-28 ENCOUNTER — DOCUMENTATION (OUTPATIENT)
Dept: GENETICS | Facility: HOSPITAL | Age: 71
End: 2021-12-28

## 2021-12-28 NOTE — PROGRESS NOTES
Gayatri Bianchi, a 71-year-old female, was referred for genetic counseling due to a family history of breast cancer.   Genetic counseling was provided via telehealth.  Ms. Bianchi was 16 years old at menarche and had her first child at age 31.  She is postmenopausal and has had a hysterectomy and unilateral salping-oophorectomy.  She was on HRT until 10 years ago, and now uses a topical estrogen cream at needed.    She was interested in discussing her risk for a hereditary cancer syndrome.   Ms. Bianchi decided to pursue comprehensive genetic testing to evaluate her risk of cancer, therefore the CancerNext panel was ordered through [employer] which analyzes 36 genes associated with an increased cancer risk. The genes on this panel include APC, AURELIO, AXIN2, BARD1, BMPR1A, BRCA1, BRCA2, BRIP1, CDH1, CDK4, CDKN2A, CHEK2, DICER1, EPCAM, GREM1, HOXB13, MLH1, MRE11A, MSH2, MSH3, MSH6, MUTYH, NBN, NF1, NTHL1, PALB2, PMS2, POLD1, POLE, PTEN, RAD51C, RAD51D, RECQL, SMAD4, SMARCA4, STK11, and TP53.  Genetic testing was negative by sequencing and rearrangement testing for deleterious mutations in the 36 genes included on the [employer] CancerNext panel (see attached results). These normal results were discussed with Ms. Bianchi by telephone on 12/28/21.     PERTINENT FAMILY HISTORY: (See attached pedigree)   Mother: Breast cancer, 48  Father:  Metastatic prostate cancer, 70s    RISK ASSESSMENT:  Ms. Bianchi’s family history of breast cancer and metastatic prostate cancer raised the question of a hereditary cancer syndrome.  Ms. Bianchi clearly meets NCCN guidelines criteria for BRCA1/2 testing based on family history of metastatic prostate cancer in a first degree relative as well as a first degree relative with early onset breast cancer.  Based on the presence of other clinically significant breast cancer related genes, the standard approach to hereditary cancer genetic testing at this time is via multi-gene panel, which evaluates for  BRCA1/2 as well as several additional genes associated with a hereditary risk for breast cancer and other cancers. If genetic testing is negative, management should be guided by a family history-based risk assessment.    At this time, Ms. Bianchi’s lifetime risk of developing breast cancer should be assessed using family history risk assessment models. Using the Tyrer-Ramanazick, v8 risk model Ms. Bianchi’s lifetime risk for breast cancer was estimated to be 9.5%, elevated above the average 69-year-old woman’s lifetime risk of 4.6%.  Per NCCN guidelines, a lifetime risk greater than 20% is considered high risk and warrants increased screening; Ms. Bianchi does not fall into this category. These risk assessments are based on the family history information provided at the time of the appointment.  The assessments could change in the future should new information be obtained.    GENETIC COUNSELING:  We reviewed the family history information in detail. Cases of cancer follow three general patterns: sporadic, familial, and hereditary.  While most cancer is sporadic, some cases appear to occur in family clusters.  These cases are said to be familial and account for 10-20% of cancer cases.  Familial cases may be due to a combination of shared genes and environmental factors among family members.  In even fewer cases (5-10%), the risk for cancer is inherited, and the genes responsible for the increased cancer risk are known.       Family histories typical of hereditary cancer syndromes usually include multiple first- and second-degree relatives diagnosed with cancer types that define a syndrome.  These cases tend to be diagnosed at younger-than-expected ages and can be bilateral or multifocal.  The cancer in these families follows an autosomal dominant inheritance pattern, which indicates the likely presence of a mutation in a cancer susceptibility gene.  Children and siblings of an individual believed to carry this mutation have a  50% chance of inheriting that mutation, thereby inheriting the increased risk to develop cancer.  These mutations can be passed down from the maternal or the paternal lineage.     Hereditary breast cancer accounts for 5-10% of all cases of breast cancer.  A significant proportion (50%) of hereditary breast cancer can be attributed to mutations in the BRCA1 and BRCA2 genes.  Mutations in these genes confer an increased risk for breast cancer, ovarian cancer, male breast cancer, prostate cancer, and pancreatic cancer.  Women with a BRCA1 or BRCA2 mutation have up to an 87% lifetime risk of breast cancer and up to a 44% risk of ovarian cancer.  There are other clinically significant breast cancer related genes in addition to BRCA1/2, including PALB2, AURELIO, and CHEK2. There are other, more rare, hereditary cancer syndromes. Based on Ms. Bianchi’s family history and her desire to get more information regarding her personal risks, testing was pursued through a multigene panel evaluating several other genes known to increase the risk for cancer.     GENETIC TESTING:  The risks, benefits, and limitations of genetic testing and implications for clinical management following testing were reviewed.  DNA test results can influence decisions regarding screening and prevention.  Genetic testing can have significant psychological implications for both individuals and families. Also discussed was the possibility of employment and insurance discrimination based on genetic test results and the laws in place to prevent this, as well as the limitations of these laws.       We discussed panel testing, which would involve testing 36 genes associated with increased cancer risk. The implications of a positive or negative test result were discussed.  We also discussed the importance of testing on an affected relative. In general, a negative genetic test result is most informative if a mutation has first been established in an affected member  of the family.  In cases where an affected individual is not available or interested in testing, it is appropriate to offer testing to an unaffected individual. We discussed the possibility that, in some cases, genetic test results may be ambiguous due to the identification of a genetic variant. These variants may or may not be associated with an increased cancer risk. With multigene panel testing, it is not uncommon for a variant of uncertain significance (VUS) to be identified.  If a VUS is identified, testing family members is typically not recommended and screening recommendations are made based on the family history.  The laboratories that perform genetic testing work to reclassify the VUS and send out an amended report if and when a VUS is reclassified.  The majority of variant findings are ultimately reclassified to a negative result. Given her family history, a negative test result does not eliminate all cancer risk, although the risk would not be as high as it would with positive genetic testing.      TEST RESULTS: Genetic testing was negative for known deleterious mutations by sequencing and rearrangement testing of the 36 genes included on the [employer] [facility] panel.  This negative result greatly lowers the risk of a hereditary cancer syndrome for Ms. Bianchi. It is possible that the family history is due to a hereditary cancer syndrome that Ms. Bianchi did not happen to inherit.  Other relatives could still consider genetic testing.   This assessment is based on the information provided at the time of the consultation.     CANCER PREVENTION:  Based on computer modeling, Ms. Bianchi’s lifetime risk for breast cancer would not be considered high risk per NCCN guidelines. General population screening guidelines include annual clinical breast exam, annual mammography, and self-breast exams. This assessment is based on the information provided at the time of the consultation. This assessment is based on the information  provided at the time of the consultation.      PLAN:  Genetic counseling remains available to Ms. Bianchi. If Ms. Bianchi has any questions, concerns, or updates to the family history, she is welcome to contact us at [phone].    Brittney Aldrich MS, [facility] [place], [facility]  Licensed Certified Genetic Counselor    Cc: TOSHIA Sims

## 2022-01-12 ENCOUNTER — OFFICE VISIT (OUTPATIENT)
Dept: GASTROENTEROLOGY | Age: 72
End: 2022-01-12
Payer: MEDICARE

## 2022-01-12 VITALS
BODY MASS INDEX: 27.42 KG/M2 | HEIGHT: 62 IN | SYSTOLIC BLOOD PRESSURE: 139 MMHG | OXYGEN SATURATION: 99 % | DIASTOLIC BLOOD PRESSURE: 80 MMHG | WEIGHT: 149 LBS | HEART RATE: 77 BPM

## 2022-01-12 DIAGNOSIS — R10.9 ABDOMINAL CRAMPING: ICD-10-CM

## 2022-01-12 DIAGNOSIS — R19.5 LOOSE STOOLS: Primary | ICD-10-CM

## 2022-01-12 PROCEDURE — 99214 OFFICE O/P EST MOD 30 MIN: CPT | Performed by: NURSE PRACTITIONER

## 2022-01-12 RX ORDER — LISINOPRIL 2.5 MG/1
2.5 TABLET ORAL DAILY
COMMUNITY
End: 2022-03-07 | Stop reason: SINTOL

## 2022-01-12 ASSESSMENT — ENCOUNTER SYMPTOMS
CHOKING: 0
RECTAL PAIN: 0
COUGH: 0
ANAL BLEEDING: 0
VOMITING: 0
DIARRHEA: 1
TROUBLE SWALLOWING: 0
CONSTIPATION: 0
ABDOMINAL PAIN: 1
SHORTNESS OF BREATH: 0
NAUSEA: 0
ABDOMINAL DISTENTION: 0
BLOOD IN STOOL: 0

## 2022-01-12 NOTE — PATIENT INSTRUCTIONS
Check stools for infection     Increasing Your Fiber Intake   What is fiber? Fiber is the portion of plant foods that cannot be digested. There are two kinds of fiber, both of which are keys to a healthy diet and a healthy digestive system:   - Soluble fiber aids in bulking and moving food through the gut. It forms a gel when mixed with liquid. - Insoluble fiber does not mix with liquids and passes through the GI tract mostly intact. It is sometimes called \"roughage. \"     Why do I need to eat it? Fiber has many important roles:   - Helps maintain regular bowel movements. More fiber can improve both diarrhea and constipation.   - Reduces the risk of developing hemorrhoids. - Lowers LDL or \"bad\" cholesterol levels, which lowers risk of heart disease.   - Regulates blood sugar levels in people with diabetes. - Provides a feeling of fullness and may help with weight loss. How much do I need? The Academy of Nutrition and Dietetics recommends:   - For women, 25 grams per day under age 48 and 21 grams per day over age 48. - For men, 38 grams per day under age 48 and 30 grams per day over age 48. What foods are the best sources? Plant foods contain fiber, but some more than others. Best choices are:   - Whole grains and high fiber cereals. - Dried beans and legumes. - Fruits and vegetables, especially raw. What about fiber supplements? If you need to add more fiber than you can get in your diet, consider:   - Type of Fiber: The major brands of fiber supplements (Metamucil®, Konsyl®, Citrucel®, Benefiber®, Fibercon®) all use soluble fiber and work in the same way. - Flavorings and Mixing: Many of the powdered brands have added flavoring and are mixed with just water. Other varieties are \"clear\" and can be added to numerous beverages and food items. Some brands also offer a \"wafer\" form. It's up to you!      Tip: Increasing the fiber in your diet gradually may help minimize bloating and discomfort. Be sure to drink plenty of fluids as you increase your fiber intake.         Fruits  Serving size  Total fiber (grams)    Pear  1 medium  5.1    Figs, dried  2 medium  3.7    Blueberries  1 cup  3.5    Apple, with skin  1 medium  4.4    Strawberries  1 cup  3.3    Peaches, dried  3 halves  3.2    Orange  1 medium  3.1    Apricots, dried  10 halves  2.6    Raisins  1.5-ounce box  1.6    Grains, cereal & pasta  Serving size  Total fiber (grams)    Spaghetti, whole-wheat  1 cup  6.3    Bran flakes  3/4 cup  5.1    Oatmeal  1 cup  4.0    Bread, rye  1 slice  1.9    Bread, whole-wheat  1 slice  1.9    Bread, mixed-grain  1 slice  1.7    Bread, cracked-wheat  1 slice  1.4

## 2022-01-12 NOTE — PROGRESS NOTES
Subjective:     Patient ID: Jaquan Manjarrez is a 73 y.o. female  PCP: Deb Beltre MD  Referring Provider: No ref. provider found    HPI  Patient presents to the office today with the following complaints: Follow-up      Pt here today for follow up after Colonoscopy on 12/14/2021 for c/o diarrhea. Random colon biopsies were negative for microscopic colitis. Today, she reports she will have days where she will have 7-8 stools a day. This comes and goes. She reports hx E. Coli infection and treated with Cipro for 7 days. She is questioning whether she still has the infection. This is associated with abdominal cramping. She denies any blood in stool. Symptoms started last Summer. She is on daily Probiotics and following high fiber diet. Colonoscopy Findings 12/14/2021:   NO large polyps or masses or strictures or colitis. Random cold biopsies were taken to check for microscopic colitis. Suboptimal exam due to prep quality as described above. Moderate to severe diverticulosis in the left colon may be a contributing factor to some of her lower GI symptoms  Internal hemorrhoids-Grade 1 without any bleeding stigmata  Where it was clearly visible, the mucosa appeared normal throughout the entire examined colon  Retroflexion in the rectum was otherwise normal and revealed no further abnormalities   Recommendations:  1. Repeat colonoscopy: pending pathology -in 3 years for colorectal cancer screening due to her prep quality today. 2. Await biopsy results-you will receive a letter with your results within 7-10 days  - Resume previous meds and diet; may add probiotics or probiotic yogurt once daily to her regimen  - GI clinic f/u 4-6 weeks with Ms. [employer] scheduled f/u appts with other MDs   - NO ASA/NSAIDs x 2 weeks     FINAL DIAGNOSIS:   A. random biopsies:   Histologically normal colon mucosa. Prominent lymphoid aggregates.     All scope and pathology reports were reviewed and discussed with patient. All questions answered, pt verbalized understanding. Assessment:     1. Loose stools    2. Abdominal cramping            Plan:   - Check stools for infection   - If negative, recommend daily fiber supplement   - Follow up pending GI panel   - Continue Probiotics       Orders  Orders Placed This Encounter   Procedures    Gastrointestinal Panel, Molecular     Medications  No orders of the defined types were placed in this encounter.         Patient History:     Past Medical History:   Diagnosis Date    Arthritis     osteoarthritis    Colon polyps     FHx: migraine headaches     Heart disease     leaky valve    Hyperlipidemia     Joint pain     Lipids abnormal        Past Surgical History:   Procedure Laterality Date    CHOLECYSTECTOMY      COLONOSCOPY  06/2011    Dr. Sterling Doe COLONOSCOPY  11/15/2016    Dr Aly [place] yr recall    COLONOSCOPY N/A 11/15/2016    COLONOSCOPY performed by [facility]    COLONOSCOPY N/A 12/14/2021    Dr Abram Naqvi, Sub prep fair, (-)Micro Colitis, Mod to severe diverticulosis, Int hemorrhoids Gr 1 wo bleeding, 3 year recall    HYSTERECTOMY      KNEE ARTHROSCOPY Left     KNEE ARTHROSCOPY Right 01/27/2016    KNEE ARTHROSCOPY WITH SUBCHONDROPLASTY  performed by Deondre Queen MD at [address] Right 2017       Family History   Problem Relation Age of Onset    Other Mother         HEART FAILURE    Breast Cancer Mother 48    Heart Disease Father 79        bypass    Prostate Cancer Father 79    Colon Cancer Neg Hx     Colon Polyps Neg Hx     Esophageal Cancer Neg Hx     Liver Cancer Neg Hx     Liver Disease Neg Hx     Rectal Cancer Neg Hx     Stomach Cancer Neg Hx        Social History     Socioeconomic History    Marital status:      Spouse name: None    Number of children: None    Years of education: None    Highest education level: None   Occupational History    None   Tobacco Use  Smoking status: Never Smoker    Smokeless tobacco: Never Used   Vaping Use    Vaping Use: Never used   Substance and Sexual Activity    Alcohol use: Yes     Comment:  SOCIALLY Rare     Drug use: No    Sexual activity: None   Other Topics Concern    None   Social History Narrative    None     Social Determinants of Health     Financial Resource Strain:     Difficulty of Paying Living Expenses: Not on file   Food Insecurity:     Worried About Running Out of Food in the Last Year: Not on file    Vincent of Food in the Last Year: Not on file   Transportation Needs:     Lack of Transportation (Medical): Not on file    Lack of Transportation (Non-Medical):  Not on file   Physical Activity:     Days of Exercise per Week: Not on file    Minutes of Exercise per Session: Not on file   Stress:     Feeling of Stress : Not on file   Social Connections:     Frequency of Communication with Friends and Family: Not on file    Frequency of Social Gatherings with Friends and Family: Not on file    Attends Confucianism Services: Not on file    Active Member of [address] [place] [address] or Organizations: Not on file    Attends Club or Organization Meetings: Not on file    Marital Status: Not on file   Intimate Partner Violence:     Fear of Current or Ex-Partner: Not on file    Emotionally Abused: Not on file    Physically Abused: Not on file    Sexually Abused: Not on file   Housing Stability:     Unable to Pay for Housing in the Last Year: Not on file    Number of [place] in the Last Year: Not on file    Unstable Housing in the Last Year: Not on file       Current Outpatient Medications   Medication Sig Dispense Refill    lisinopril (PRINIVIL;ZESTRIL) 2.5 MG tablet Take 2.5 mg by mouth daily      SUMAtriptan (IMITREX) 50 MG tablet Take 50 mg by mouth as needed      Cholecalciferol (VITAMIN D) 50 MCG (2000 UT) CAPS capsule Take by mouth daily       Omega-3 Fatty Acids (FISH OIL) 1000 MG CAPS Take 1,200 mg by mouth as needed       calcium carbonate (OSCAL) 500 MG TABS tablet Take 1,200 mg by mouth daily as needed       Probiotic Product (PROBIOTIC-10 PO) Take by mouth daily as needed       conjugated estrogens (PREMARIN) 0.625 MG/GM vaginal cream Place vaginally as needed Place vaginally daily.  Multiple Vitamins-Minerals (VISION FORMULA PO) Take by mouth daily as needed       Fexofenadine HCl (ALLEGRA PO) Take 180 mg by mouth daily as needed       amitriptyline (ELAVIL) 10 MG tablet Take 10 mg by mouth nightly. No current facility-administered medications for this visit. Allergies   Allergen Reactions    Irbesartan Other (See Comments)    Atorvastatin Other (See Comments)     memory issues  Makes her feel \"awful\"      Atenolol      Other reaction(s): Dizziness    Codeine Nausea And Vomiting    Lisinopril Other (See Comments)       Review of Systems   Constitutional: Negative for activity change, appetite change, fatigue, fever and unexpected weight change. HENT: Negative for trouble swallowing. Respiratory: Negative for cough, choking and shortness of breath. Cardiovascular: Negative for chest pain. Gastrointestinal: Positive for abdominal pain (cramping) and diarrhea (loose). Negative for abdominal distention, anal bleeding, blood in stool, constipation, nausea, rectal pain and vomiting. Allergic/Immunologic: Negative for food allergies. All other systems reviewed and are negative. Objective:     /80 (Site: Left Upper Arm)   Pulse 77   Ht 5' 2\" (1.575 m)   Wt 149 lb (67.6 kg)   SpO2 99%   BMI 27.25 kg/m²     Physical Exam  Vitals reviewed. Constitutional:       General: She is not in acute distress. Appearance: She is well-developed. HENT:      Nose:      Comments: Mask on     Mouth/Throat:      Comments: Mask on  Eyes:      General:         Right eye: No discharge. Left eye: No discharge.    Cardiovascular:      Rate and Rhythm: Normal rate and regular rhythm. Heart sounds: No murmur heard. Pulmonary:      Effort: Pulmonary effort is normal. No respiratory distress. Breath sounds: Normal breath sounds. Abdominal:      General: Bowel sounds are normal. There is no distension. Palpations: Abdomen is soft. There is no mass. Tenderness: There is no abdominal tenderness. There is no guarding or rebound. Musculoskeletal:         General: Normal range of motion. Cervical back: Normal range of motion. Skin:     General: Skin is warm and dry. Neurological:      Mental Status: She is alert and oriented to person, place, and time.    Psychiatric:         Behavior: Behavior normal.

## 2022-01-14 NOTE — PROGRESS NOTES
Subjective    Ms. Bianchi is 71 y.o. female    Chief Complaint: Bladder Wall Thickening    History of Present Illness  Abnormal radiographic finding   This patient presents with a possible abnormal finding of the bladderon CT that included abdominal cuts. This is a  new finding. This test was done 2 month(s) ago. Onset is sudden. This was done in context of evaluation for an unrelated illness. Symptoms include frequency, nocturia X 2-3, denies incontinence. Patient with isolated UTI.  Received treatment for E. Coli UTI at time of CT.      The following portions of the patient's history were reviewed and updated as appropriate: allergies, current medications, past family history, past medical history, past social history, past surgical history and problem list.    Review of Systems   Constitutional: Negative for appetite change, diaphoresis and fever.   HENT: Negative for facial swelling and sore throat.    Eyes: Negative for discharge and visual disturbance.   Respiratory: Negative for cough and shortness of breath.    Cardiovascular: Negative for chest pain and leg swelling.   Gastrointestinal: Negative for anal bleeding and vomiting.   Endocrine: Negative for cold intolerance and heat intolerance.   Genitourinary: Positive for frequency. Negative for dysuria, flank pain, hematuria, pelvic pain and urgency.   Musculoskeletal: Negative for back pain and gait problem.   Skin: Negative for pallor and rash.   Allergic/Immunologic: Negative for immunocompromised state.   Neurological: Negative for seizures and headaches.   Hematological: Negative for adenopathy. Does not bruise/bleed easily.   Psychiatric/Behavioral: Negative for dysphoric mood, self-injury and suicidal ideas.         Current Outpatient Medications:   •  amitriptyline (ELAVIL) 10 MG tablet, Take 10 mg by mouth Every Night., Disp: , Rfl:   •  conjugated estrogens (PREMARIN) 0.625 MG/GM vaginal cream, Insert  into the vagina., Disp: , Rfl:   •  Diclofenac  "Sodium (Voltaren) 1 % gel gel, Voltaren 1 % topical gel  APPLY 4 GRAMS TO THE AFFECTED AREA(S) BY TOPICAL ROUTE 4 TIMES PER DAY, Disp: , Rfl:   •  fexofenadine (ALLEGRA) 180 MG tablet, Take 180 mg by mouth Daily., Disp: , Rfl:   •  lisinopril (PRINIVIL,ZESTRIL) 2.5 MG tablet, , Disp: , Rfl:   •  Omega-3 Fatty Acids (fish oil) 1000 MG capsule capsule, Take 1,200 mg by mouth., Disp: , Rfl:   •  SUMAtriptan (IMITREX) 50 MG tablet, Take 50 mg by mouth 1 (One) Time As Needed., Disp: , Rfl:   •  zolpidem (Ambien) 5 MG tablet, Take 5 mg by mouth At Night As Needed., Disp: , Rfl:   •  famotidine (PEPCID) 20 MG tablet, Take 20 mg by mouth 2 (Two) Times a Day., Disp: , Rfl:   •  nitrofurantoin, macrocrystal-monohydrate, (Macrobid) 100 MG capsule, Take 1 capsule by mouth 2 (Two) Times a Day., Disp: 14 capsule, Rfl: 0    Past Medical History:   Diagnosis Date   • GERD (gastroesophageal reflux disease)    • Migraine        Past Surgical History:   Procedure Laterality Date   • BREAST BIOPSY     • HYSTERECTOMY     • OOPHORECTOMY Right        Social History     Socioeconomic History   • Marital status:    Tobacco Use   • Smoking status: Never Smoker   • Smokeless tobacco: Never Used   Vaping Use   • Vaping Use: Never used   Substance and Sexual Activity   • Alcohol use: Yes   • Drug use: Never   • Sexual activity: Defer       Family History   Problem Relation Age of Onset   • Breast cancer Mother        Objective    Temp 98.8 °F (37.1 °C) (Temporal)   Ht 157.5 cm (62\")   Wt 70 kg (154 lb 4.8 oz)   LMP  (LMP Unknown)   BMI 28.22 kg/m²     Physical Exam        Results for orders placed or performed during the hospital encounter of 08/29/20   POC Urinalysis Dipstick, Multipro (Automated dipstick)    Specimen: Urine   Result Value Ref Range    Color Yellow Yellow, Straw, Dark Yellow, [place]    Clarity, UA Clear Clear    Glucose, UA Negative Negative, 1000 mg/dL (3+) mg/dL    Bilirubin Negative Negative    Ketones, UA " Negative Negative    Specific Gravity  1.015 1.005 - 1.030    Blood, UA Negative Negative    pH, Urine 6.0 5.0 - 8.0    Protein, POC Negative Negative mg/dL    Urobilinogen, UA Normal Normal    Nitrite, UA Negative Negative    Leukocytes Negative Negative     CT independent review  The CT scan of the abdomen/pelvis done without contrast is available for me to review.  Treatment recommendations require an independent review.  First I scanned the liver, spleen, and bowel pattern.  The retroperitoneum including the major vessels and lymphatic packages are briefly reviewed.  This film has been reviewed by the radiologist to determine any non-urologic abnormalities that are present.  The kidneys are closely inspected for size, symmetry, contour, parenchymal thickness, perinephric reaction, presence of calcifications, and intrarenal dilation of the collecting system.  The ureters are inspected for their course, caliber, and any calcifications.  The bladder is inspected for its thickness, size, and presence of any calcifications.  This scan shows:    The right kidney appears normal on this non-contrasted CT scan.  The renal parenchymal is normal in thickness.  There are no solid masses or cysts.  There is no hydronephrosis.  There are no stones.      The left kidney appears normal on this non-contrasted CT scan.  The renal parenchymal is normal in thickness.  There are no solid masses or cysts.  There is no hydronephrosis.  There are no stones.      The bladder appears circumferential bladder wall thickening.    Assessment and Plan    Diagnoses and all orders for this visit:    1. Bladder wall thickening (Primary)  -     Cancel: POC Urinalysis Dipstick, Multipro          I reviewed records indicating patient had a CT scan done for diarrhea.  This revealed a symmetrically circumferential thickened bladder.  She has minimal symptoms.  I do not recommend further evaluation with cystoscopy.  She will follow-up on a as needed  basis.

## 2022-01-17 ENCOUNTER — OFFICE VISIT (OUTPATIENT)
Dept: UROLOGY | Facility: CLINIC | Age: 72
End: 2022-01-17

## 2022-01-17 VITALS — TEMPERATURE: 98.8 F | HEIGHT: 62 IN | BODY MASS INDEX: 28.39 KG/M2 | WEIGHT: 154.3 LBS

## 2022-01-17 DIAGNOSIS — N32.89 BLADDER WALL THICKENING: Primary | ICD-10-CM

## 2022-01-17 PROCEDURE — 99203 OFFICE O/P NEW LOW 30 MIN: CPT | Performed by: UROLOGY

## 2022-01-17 RX ORDER — CHLORAL HYDRATE 500 MG
1200 CAPSULE ORAL
COMMUNITY

## 2022-01-17 RX ORDER — LISINOPRIL 2.5 MG/1
TABLET ORAL
COMMUNITY
Start: 2022-01-04 | End: 2023-03-06

## 2022-03-07 ENCOUNTER — OFFICE VISIT (OUTPATIENT)
Dept: CARDIOLOGY CLINIC | Age: 72
End: 2022-03-07
Payer: MEDICARE

## 2022-03-07 ENCOUNTER — TELEPHONE (OUTPATIENT)
Dept: CARDIOLOGY CLINIC | Age: 72
End: 2022-03-07

## 2022-03-07 VITALS
HEART RATE: 77 BPM | DIASTOLIC BLOOD PRESSURE: 90 MMHG | SYSTOLIC BLOOD PRESSURE: 138 MMHG | WEIGHT: 146 LBS | HEIGHT: 62 IN | BODY MASS INDEX: 26.87 KG/M2

## 2022-03-07 DIAGNOSIS — I08.0 MILD MITRAL AND AORTIC REGURGITATION: ICD-10-CM

## 2022-03-07 DIAGNOSIS — R00.2 PALPITATIONS: ICD-10-CM

## 2022-03-07 DIAGNOSIS — R00.2 PALPITATIONS: Primary | ICD-10-CM

## 2022-03-07 DIAGNOSIS — I51.89 GRADE I DIASTOLIC DYSFUNCTION: ICD-10-CM

## 2022-03-07 DIAGNOSIS — I10 ESSENTIAL HYPERTENSION: Primary | ICD-10-CM

## 2022-03-07 DIAGNOSIS — R42 DIZZINESS: ICD-10-CM

## 2022-03-07 PROCEDURE — 99214 OFFICE O/P EST MOD 30 MIN: CPT | Performed by: NURSE PRACTITIONER

## 2022-03-07 PROCEDURE — 93242 EXT ECG>48HR<7D RECORDING: CPT | Performed by: NURSE PRACTITIONER

## 2022-03-07 PROCEDURE — 93000 ELECTROCARDIOGRAM COMPLETE: CPT | Performed by: NURSE PRACTITIONER

## 2022-03-07 RX ORDER — FELODIPINE 2.5 MG/1
2.5 TABLET, EXTENDED RELEASE ORAL DAILY
COMMUNITY
End: 2022-03-23

## 2022-03-07 RX ORDER — FLUTICASONE PROPIONATE 50 MCG
1 SPRAY, SUSPENSION (ML) NASAL DAILY
COMMUNITY

## 2022-03-07 NOTE — TELEPHONE ENCOUNTER
Pt called stating she is have dizzy spells. /50, pulse is irregular at 83. Made pt an apt for today.

## 2022-03-07 NOTE — PROGRESS NOTES
[facility] [address] [place] [address]. [address] [facility]  [phone] office  [phone] fax      OFFICE VISIT:  3/7/2022    Otis Humphries - : 1950  Reason For Visit:  Alejandra Sam is a 73 y.o. female who is here for Palpitations and Hypertension    History:   Diagnosis Orders   1. Essential hypertension  ECHO Complete 2D W Doppler W Color   2. Palpitations  EKG 12 lead    ECHO Complete 2D W Doppler W Color    MD EXTERNAL ECG REC>48HR<7D RECORDING   3. Grade I diastolic dysfunction     4. Mild mitral and aortic regurgitation  ECHO Complete 2D W Doppler W Color   5. Dizziness  ECHO Complete 2D W Doppler W Color    MD EXTERNAL ECG REC>48HR<7D RECORDING     The patient presents today for cardiology follow up. The patient presents today for follow up after starting felodipine on 22. The patient reports BP this AM was 140/76. Later in the morning the patient reports feeling dizzy and \"not just right. \"  She reports BP at that time was 150/50. The patient reports that yesterday did not feel well and BP was checked at 156/72. She reports a history of drug intolerance to many anti-hypertensive medications in the past but seems to be tolerating the felodipine. A few days ago, the patient did a long bike ride and attained a heart rate of 132. She reports having no symptoms with the physical activity to suggest myocardia ischemia. The patient also reports \"I did a mile walk  Recently and felt a little short of breath with a little pressure. \"  The patient had a normal stress echo on 20. A 2D echocardiogram on 20 showed EF at 60-65%, mild LVH, grade 1 diastolic dysfunction, mild AR, mild MR and normal aortic root dimension. She also reports sensation of irregular heart rate this morning. The patient's PCP monitors cholesterol. The patient  Reports father had CABG in his 66's.       Subjective  Luz denies exertional chest pain, orthopnea, paroxysmal nocturnal dyspnea, syncope, presyncope,  edema and fatigue. The patient denies numbness or weakness to suggest cerebrovascular accident or transient ischemic attack. + intermittent elevated BP with associated dizziness. + episode of MCKINLEY with chest tightness. + episode of irregular heart rate.     Susan Mancera has the following history as recorded in [facility]:  Patient Active Problem List   Diagnosis Code    History of colonic polyps Z86.010    Intermittent constipation K59.09    Valvular heart disease I38    Essential hypertension I10    LVH (left ventricular hypertrophy) I51.7    Chest tightness R07.89     Past Medical History:   Diagnosis Date    Arthritis     osteoarthritis    Colon polyps     FHx: migraine headaches     Heart disease     leaky valve    Hyperlipidemia     Joint pain     Lipids abnormal      Past Surgical History:   Procedure Laterality Date    CHOLECYSTECTOMY      COLONOSCOPY  06/2011    Dr. Deirdre Rangel COLONOSCOPY  11/15/2016    Dr Alfie Khan yr recall    COLONOSCOPY N/A 11/15/2016    COLONOSCOPY performed by Nette Watson DO at [address] [facility]    COLONOSCOPY N/A 12/14/2021    Dr Lerner Balloon, Sub prep fair, (-)Micro Colitis, Mod to severe diverticulosis, Int hemorrhoids Gr 1 wo bleeding, 3 year recall    HYSTERECTOMY      KNEE ARTHROSCOPY Left     KNEE ARTHROSCOPY Right 01/27/2016    KNEE ARTHROSCOPY WITH SUBCHONDROPLASTY  performed by Garrett Urrutia MD at [facility] Right 2017     Family History   Problem Relation Age of Onset    Other Mother         HEART FAILURE    Breast Cancer Mother 48    Heart Disease Father 79        bypass    Prostate Cancer Father 79    Colon Cancer Neg Hx     Colon Polyps Neg Hx     Esophageal Cancer Neg Hx     Liver Cancer Neg Hx     Liver Disease Neg Hx     Rectal Cancer Neg Hx     Stomach Cancer Neg Hx      Social History     Tobacco Use    Smoking status: Never Smoker    Smokeless tobacco: Never Used   Substance Use Topics    Alcohol use: Yes     Comment:  SOCIALLY Rare       Current Outpatient Medications   Medication Sig Dispense Refill    felodipine (PLENDIL) 2.5 MG extended release tablet Take 2.5 mg by mouth daily      fluticasone (FLONASE) 50 MCG/ACT nasal spray 1 spray by Each Nostril route daily      Cholecalciferol (VITAMIN D) 50 MCG (2000 UT) CAPS capsule Take by mouth daily       Omega-3 Fatty Acids (FISH OIL) 1000 MG CAPS Take 1,200 mg by mouth as needed       calcium carbonate (OSCAL) 500 MG TABS tablet Take 1,200 mg by mouth daily as needed       Probiotic Product (PROBIOTIC-10 PO) Take by mouth daily as needed       conjugated estrogens (PREMARIN) 0.625 MG/GM vaginal cream Place vaginally as needed Place vaginally daily.  Multiple Vitamins-Minerals (VISION FORMULA PO) Take by mouth daily as needed       Fexofenadine HCl (ALLEGRA PO) Take 180 mg by mouth daily as needed       amitriptyline (ELAVIL) 10 MG tablet Take 10 mg by mouth nightly.  lisinopril (PRINIVIL;ZESTRIL) 2.5 MG tablet Take 2.5 mg by mouth daily       No current facility-administered medications for this visit. Allergies: Irbesartan, Atorvastatin, Atenolol, Codeine, and Lisinopril    Review of Systems  Constitutional - no appetite change, or unexpected weight change. No fever, chills or diaphoresis. No significant change in activity level or new onset of fatigue. HEENT - no significant rhinorrhea or epistaxis. No tinnitus or significant hearing loss. Eyes - no sudden vision change or amaurosis. No corneal arcus, xantholasma, subconjunctival hemorrhage or discharge. Respiratory - no significant wheezing, stridor, apnea or cough. + one episode MCKINLEY with associated chest tightness. Otherwise, very active with no symptoms. Cardiovascular - no exertional chest pain to suggest myocardial ischemia. No orthopnea or PND. No occurrence of slow heart rate. No palpitations. No claudication. + one episode of irregular heart rate. Gastrointestinal - no abdominal swelling or pain. No blood in stool. No severe constipation, diarrhea, nausea, or vomiting. Genitourinary - no dysuria, frequency, or urgency. No flank pain or hematuria. Musculoskeletal - no back pain or myalgia. No problems with gait. Extremities - no clubbing, cyanosis or extremity edema. Skin - no color change or rash. No pallor. No new surgical incision. Neurologic - no speech difficulty, facial asymmetry or lateralizing weakness. No seizures, presyncope or syncope.  + intermittent dizziness noted with BP elevation. Hematologic - no easy bruising or excessive bleeding. Psychiatric - no severe anxiety or insomnia. No confusion. All other review of systems are negative. Objective  Vital Signs - BP (!) 138/90   Pulse 77   Ht 5' 2\" (1.575 m)   Wt 146 lb (66.2 kg)   BMI 26.70 kg/m²   General - Luz is alert, cooperative, and pleasant. Well groomed. No acute distress. Body habitus - Body mass index is 26.7 kg/m². HEENT - Head is normocephalic. No circumoral cyanosis. Dentition is normal.  EYES -   Lids normal without ptosis. No discharge, edema or subconjunctival hemorrhage. Neck - Symmetrical without apparent mass or lymphadenopathy. Respiratory - Normal respiratory effort without use of accessory muscles. Ausculatation reveals vesicular breath sounds without crackles, wheezes, rub or rhonchi. Cardiovascular - No jugular venous distention. Auscultation reveals regular rate and rhythm. No audible clicks, gallop or rub. No murmur. No lower extremity varicosities. No carotid bruits. Abdominal -  No visible distention, mass or pulsations. Extremities - No clubbing or cyanosis. No statis dermatitis or ulcers. No edema. Musculoskeletal -   No Osler's nodes. No kyphosis or scoliosis. Gait is even and regular without limp or shuffle. Ambulates without assistance.   Skin -  Warm and dry; no rash or pallor. No new surgical wound. Neurological - No focal neurological deficits. Thought processes coherent. No apparent tremor. Oriented to person, place and time. Psychiatric -  Appropriate affect and mood. Data reviewed:  6/1/20 echo   LV is normal in size with normal systolic function. LV ejection fraction  estimated at 60-65%. Mild concentric LVH. Grade 1 diastolic dysfunction. RV is normal in size with normal systolic function. Normal left atrial size. Normal right atrial size. Aortic valve is trileaflet with mild to moderate leaflet thickening but  normal leaflet opening. No stenosis. Mild aortic regurgitation. Mitral valve is moderately thickened with normal leaflet mobility. No  stenosis. Mild mitral regurgitation. Mild tricuspid regurgitation. Trivial pulmonic regurgitation. Signature    Electronically signed by Willy Gaona MD(Interpreting physician)  on 06/01/2020 01:37 PM    4/6/20 SE  Stress echocardiogram without clinical, electrocardiographic, or  echocardiographic evidence of myocardial ischemia. Fishman Treadmill Score was 6. There is a low risk of myocardial ischemia.     Signature   Electronically signed by Willy Gaona MD(Interpreting physician)  on 04/06/2020 04:09 PM    Lab Results   Component Value Date    WBC 3.8 (L) 08/23/2016    HGB 14.6 08/23/2016    HCT 42.1 08/23/2016    MCV 90.9 08/23/2016     08/23/2016     Lab Results   Component Value Date     08/16/2016    K 4.1 08/16/2016     08/16/2016    CO2 25 08/16/2016    BUN 25 (H) 08/16/2016    CREATININE 0.9 08/16/2016    GLUCOSE 107 08/16/2016    CALCIUM 9.6 08/16/2016    PROT 7.1 08/16/2016    LABALBU 4.3 08/16/2016    BILITOT 0.3 08/16/2016    ALKPHOS 94 08/16/2016    AST 37 (H) 08/16/2016    ALT 47 (H) 08/16/2016    LABGLOM >60 08/16/2016    GLOB 2.8 08/16/2016       Lab Results   Component Value Date    CHOL 304 (H) 08/16/2016    CHOL 216 (H) 01/15/2016     Lab Results Component Value Date    TRIG 83 (L) 08/16/2016    TRIG 87 (L) 01/15/2016     Lab Results   Component Value Date    HDL 92 08/16/2016    HDL 96 01/15/2016     Lab Results   Component Value Date    LDLCALC 195 08/16/2016       Lab Results   Component Value Date    TSH 3.40 08/23/2016       EKG today reviewed: NSR 77; RBBB; occasional PACs. No acute ischemic changes. No significant change in comparison to 6/13/18 EKG. BP Readings from Last 3 Encounters:   03/07/22 (!) 138/90   01/12/22 139/80   12/14/21 (!) 95/57    Pulse Readings from Last 3 Encounters:   03/07/22 77   01/12/22 77   12/14/21 71        Wt Readings from Last 3 Encounters:   03/07/22 146 lb (66.2 kg)   01/12/22 149 lb (67.6 kg)   12/14/21 143 lb (64.9 kg)     Assessment/Plan:   Diagnosis Orders   1. Essential hypertension  ECHO Complete 2D W Doppler W Color   2. Palpitations  EKG 12 lead    ECHO Complete 2D W Doppler W Color    LA EXTERNAL ECG REC>48HR<7D RECORDING   3. Grade I diastolic dysfunction     4. Mild mitral and aortic regurgitation  ECHO Complete 2D W Doppler W Color   5. Dizziness  ECHO Complete 2D W Doppler W Color    LA EXTERNAL ECG REC>48HR<7D RECORDING     HTN - home BP log reviewed - see under media tab. Increase Plendil to 2.5 mg BID in an effort to achieve consistent BP goal of less than 130/80. Palpitations - one week Zio cardiac monitor placed today. Disposition pending. Grade 1 DD - no overt symptoms fo heart failure with preserved EF. Check 2D echocardiogram.    The patient is very active walking and biking with no symptoms other than one episode last week with some MCKINLEY and associated chest tightness. The following day, she biked and achieved a heart rate of 132 and felt great. She has no hx of CAD.  4/6/20 SE was negative. Will consider Lexiscan with persistent symptoms. Patient is compliant with medication regimen. Previous cardiac history and records reviewed.   Continue current medical management for cardiac related condition. Continue other current medications as directed. Continue to follow up with primary care provider for non cardiac medical problems. If your primary care provider is outside of the [facility], please request that your labs be faxed to this office at [phone]. BP goal 130/80 or less. Call the office with any problems, questions or concerns at [phone]. Cardiology follow up as scheduled in [address] appointments. 2 week follow up. Educational included in patient instructions. Heart health.       Shaun Robertson, APRN

## 2022-03-07 NOTE — PATIENT INSTRUCTIONS
New instructions for today:    Start taking felodipine 2.5 mg (1) tab twice daily for blood pressure control. Monitor your blood pressure twice daily and keep a log. Your blood pressure goal is 130/80 or less. We will discuss in 2 weeks by either scheduled telephone encounter or patient call back. Office phone number [phone]. The adhesive cardiac monitor will need to stay on for one week. Use the symptom marker as instructed. Mail back the monitor in the postage paid box provided. Echocardiogram -  No prep. Roy at the [address] [place] [address] and [address] [facility] located on the first floor of [place] [address] through hospital main entrance and turn immediately to your left. Date/Time:     An echocardiogram uses sound waves to produce images of your heart. This commonly used test allows your doctor to see how your heart is beating and pumping blood. Your doctor can use the images from an echocardiogram to identify various abnormalities in the heart muscle and valves. This test has 2 parts:   Ø You will be asked to disrobe from the waist up and given a gown to wear. The technologist will then hook up an EKG monitor to you for the entire exam.   Ø You will then have an ultrasound of your heart (echocardiogram) to assess the heart muscle, heart valves and heart function. You may eat and take any medicines before the exam.     If you need to change your appointment, please call outpatient scheduling at [phone]. Patient Instructions:  Continue current medications as prescribed. Always keep a current medication list. Bring your medications to every office visit. Continue to follow up with primary care provider for non cardiac medical problems. Call the office with any problems, questions or concerns at [phone]. If you have been asked to keep a blood pressure log, do so for 2 weeks. Call the office to report readings to the triage nurse at [phone].   Follow up with cardiologist as scheduled. The following educational material has been included in this after visit summary for your review: Life simple 7. Heart health. Life simple 7  1) Manage blood pressure - high blood pressure is a major risk factor for heart disease and stroke. Keeping blood pressure in health range reduces strain on your heart, arteries and kidneys. Blood pressure goal is less than 130/80. 2) Control cholesterol - contributes to plaque, which can clog arteries and lead to heart disease and stroke. When you control your cholesterol you are giving your arteries their best chance to remain clear. It is recommended that you get cholesterol lab work done once a year. 3) Reduce blood sugar - most of the food we eat is turning into glucose or blood sugar that our body uses for energy. Over time, high levels of blood sugar can damage your heart, kidneys, eyes and nerves. 4) Get active - living an active life is one of the most rewarding gifts you can give yourself and those you love. Simply put, daily physical activity increases your length and quality of life. Strive to exercise 15 minutes most days of the week. 5)  Eat better - A healthy diet is one of your best weapons for fighting cardiovascular disease. When you eat a heart healthy diet, you improve your chances for feeling good and staying healthy for life. 6)  Lose weight - when you shed extra fat an unnecessary pounds, you reduce the burden on your hear, lungs, blood vessels and skeleton. You give yourself the gift of active living, you lower your blood pressure and help yourself feel better. 7) Stop smoking - cigarette smokers have a higher risk of developing cardiovascular disease. If  You smoke, quitting is the best thing you can do for your health. Check American Heart Association on line for more information on Life's Simple 7 and tips for healthy living.      A Healthy Heart: Care Instructions  Your Care Instructions Coronary artery disease, also called heart disease, occurs when a substance called plaque builds up in the vessels that supply oxygen-rich blood to your heart muscle. This can narrow the blood vessels and reduce blood flow. A heart attack happens when blood flow is completely blocked. A high-fat diet, smoking, and other factors increase the risk of heart disease. Your doctor has found that you have a chance of having heart disease. You can do lots of things to keep your heart healthy. It may not be easy, but you can change your diet, exercise more, and quit smoking. These steps really work to lower your chance of heart disease. Follow-up care is a key part of your treatment and safety. Be sure to make and go to all appointments, and call your doctor if you are having problems. It's also a good idea to know your test results and keep a list of the medicines you take. How can you care for yourself at home? Diet  · Use less salt when you cook and eat. This helps lower your blood pressure. Taste food before salting. Add only a little salt when you think you need it. With time, your taste buds will adjust to less salt. · Eat fewer snack items, fast foods, canned soups, and other high-salt, high-fat, processed foods. · Read food labels and try to avoid saturated and trans fats. They increase your risk of heart disease by raising cholesterol levels. · Limit the amount of solid fat-butter, margarine, and shortening-you eat. Use olive, peanut, or canola oil when you cook. Bake, broil, and steam foods instead of frying them. · Eat a variety of fruit and vegetables every day. Dark green, deep orange, red, or yellow fruits and vegetables are especially good for you. Examples include spinach, carrots, peaches, and berries. · Foods high in fiber can reduce your cholesterol and provide important vitamins and minerals.  High-fiber foods include whole-grain cereals and breads, oatmeal, beans, brown rice, citrus fruits, and apples. · Eat lean proteins. Heart-healthy proteins include seafood, lean meats and poultry, eggs, beans, peas, nuts, seeds, and soy products. · Limit drinks and foods with added sugar. These include candy, desserts, and soda pop. Lifestyle changes  · If your doctor recommends it, get more exercise. Walking is a good choice. Bit by bit, increase the amount you walk every day. Try for at least 30 minutes on most days of the week. You also may want to swim, bike, or do other activities. · Do not smoke. If you need help quitting, talk to your doctor about stop-smoking programs and medicines. These can increase your chances of quitting for good. Quitting smoking may be the most important step you can take to protect your heart. It is never too late to quit. · Limit alcohol to 2 drinks a day for men and 1 drink a day for women. Too much alcohol can cause health problems. · Manage other health problems such as diabetes, high blood pressure, and high cholesterol. If you think you may have a problem with alcohol or drug use, talk to your doctor. Medicines  · Take your medicines exactly as prescribed. Call your doctor if you think you are having a problem with your medicine. · If your doctor recommends aspirin, take the amount directed each day. Make sure you take aspirin and not another kind of pain reliever, such as acetaminophen (Tylenol). When should you call for help? [id number] if you have symptoms of a heart attack. These may include:  · Chest pain or pressure, or a strange feeling in the chest.  · Sweating. · Shortness of breath. · Pain, pressure, or a strange feeling in the back, neck, jaw, or upper belly or in one or both shoulders or arms. · Lightheadedness or sudden weakness. · A fast or irregular heartbeat. After you call 911, the  may tell you to chew 1 adult-strength or 2 to 4 low-dose aspirin. Wait for an ambulance. Do not try to drive yourself.   Watch closely for changes in your health, and be sure to contact your doctor if you have any problems. Where can you learn more? Go to [email or web address].[email or web address]. org and sign in to your [employer] account. Enter E032 in the [facility] box to learn more about \"A Healthy Heart: Care Instructions. \"     If you do not have an account, please click on the \"Sign Up Now\" link. Current as of: December 16, 2019               Content Version: 12.5  © 9065-0940 [employer], [employer]. Care instructions adapted under license by [facility] ([facility]). If you have questions about a medical condition or this instruction, always ask your healthcare professional. [address] any warranty or liability for your use of this information.

## 2022-03-08 ENCOUNTER — TELEPHONE (OUTPATIENT)
Dept: CARDIOTHORACIC SURGERY | Age: 72
End: 2022-03-08

## 2022-03-08 NOTE — TELEPHONE ENCOUNTER
Returned call to patient's PCP Syd New as requested. Patient is not tolerating felodipine. She is considering a change to low dose Lopressor. Zio monitor in place for one week. 2D echo scheduled.     ASHLEY Patel

## 2022-03-09 DIAGNOSIS — R07.9 CHEST PAIN, UNSPECIFIED TYPE: Primary | ICD-10-CM

## 2022-03-18 ENCOUNTER — HOSPITAL ENCOUNTER (OUTPATIENT)
Dept: NON INVASIVE DIAGNOSTICS | Age: 72
Discharge: HOME | End: 2022-03-18
Payer: MEDICARE

## 2022-03-18 DIAGNOSIS — I08.0 MILD MITRAL AND AORTIC REGURGITATION: ICD-10-CM

## 2022-03-18 DIAGNOSIS — R00.2 PALPITATIONS: ICD-10-CM

## 2022-03-18 DIAGNOSIS — I10 ESSENTIAL HYPERTENSION: ICD-10-CM

## 2022-03-18 DIAGNOSIS — R42 DIZZINESS: ICD-10-CM

## 2022-03-18 LAB
LV EF: 58 %
LVEF MODALITY: NORMAL

## 2022-03-18 PROCEDURE — 93306 TTE W/DOPPLER COMPLETE: CPT

## 2022-03-22 ENCOUNTER — HOSPITAL ENCOUNTER (OUTPATIENT)
Dept: NUCLEAR MEDICINE | Age: 72
Discharge: HOME | End: 2022-03-24
Payer: MEDICARE

## 2022-03-22 ENCOUNTER — TELEPHONE (OUTPATIENT)
Dept: CARDIOTHORACIC SURGERY | Age: 72
End: 2022-03-22

## 2022-03-22 DIAGNOSIS — R07.9 CHEST PAIN, UNSPECIFIED TYPE: ICD-10-CM

## 2022-03-22 LAB
LV EF: 83 %
LVEF MODALITY: NORMAL

## 2022-03-22 PROCEDURE — 93017 CV STRESS TEST TRACING ONLY: CPT

## 2022-03-22 PROCEDURE — A9502 TC99M TETROFOSMIN: HCPCS | Performed by: NURSE PRACTITIONER

## 2022-03-22 PROCEDURE — 3430000000 HC RX DIAGNOSTIC RADIOPHARMACEUTICAL: Performed by: NURSE PRACTITIONER

## 2022-03-22 RX ADMIN — TETROFOSMIN 8 MILLICURIE: 1.38 INJECTION, POWDER, LYOPHILIZED, FOR SOLUTION INTRAVENOUS at 10:43

## 2022-03-22 RX ADMIN — TETROFOSMIN 24 MILLICURIE: 1.38 INJECTION, POWDER, LYOPHILIZED, FOR SOLUTION INTRAVENOUS at 10:43

## 2022-03-23 ENCOUNTER — TELEPHONE (OUTPATIENT)
Dept: CARDIOLOGY CLINIC | Age: 72
End: 2022-03-23

## 2022-03-23 ENCOUNTER — OFFICE VISIT (OUTPATIENT)
Dept: CARDIOLOGY CLINIC | Age: 72
End: 2022-03-23
Payer: MEDICARE

## 2022-03-23 VITALS
BODY MASS INDEX: 27.05 KG/M2 | SYSTOLIC BLOOD PRESSURE: 132 MMHG | HEIGHT: 62 IN | OXYGEN SATURATION: 98 % | DIASTOLIC BLOOD PRESSURE: 88 MMHG | HEART RATE: 68 BPM | WEIGHT: 147 LBS

## 2022-03-23 DIAGNOSIS — I10 ESSENTIAL HYPERTENSION: Primary | ICD-10-CM

## 2022-03-23 DIAGNOSIS — I51.89 GRADE I DIASTOLIC DYSFUNCTION: ICD-10-CM

## 2022-03-23 DIAGNOSIS — I35.1 MILD AORTIC REGURGITATION: ICD-10-CM

## 2022-03-23 DIAGNOSIS — R00.2 PALPITATIONS: ICD-10-CM

## 2022-03-23 PROCEDURE — 99214 OFFICE O/P EST MOD 30 MIN: CPT | Performed by: NURSE PRACTITIONER

## 2022-03-23 RX ORDER — NEBIVOLOL 2.5 MG/1
2.5 TABLET ORAL DAILY
Qty: 30 TABLET | Refills: 0 | Status: SHIPPED | OUTPATIENT
Start: 2022-03-23 | End: 2022-04-07 | Stop reason: SINTOL

## 2022-03-23 NOTE — PATIENT INSTRUCTIONS
New instructions for today:    Stop Lopressor. Start Bystolic 2.5 mg (1) every morning. Monitor your blood pressure twice daily and keep a log. Your blood pressure goal is 130/80 or less. We will discuss in 2 weeks by either scheduled telephone encounter or patient call back. Office phone number [phone]. Patient Instructions:  Continue current medications as prescribed. Always keep a current medication list. Bring your medications to every office visit. Continue to follow up with primary care provider for non cardiac medical problems. Call the office with any problems, questions or concerns at [phone]. If you have been asked to keep a blood pressure log, do so for 2 weeks. Call the office to report readings to the triage nurse at [phone]. Follow up with cardiologist as scheduled. The following educational material has been included in this after visit summary for your review: Life simple 7. Heart health. Life simple 7  1) Manage blood pressure - high blood pressure is a major risk factor for heart disease and stroke. Keeping blood pressure in health range reduces strain on your heart, arteries and kidneys. Blood pressure goal is less than 130/80. 2) Control cholesterol - contributes to plaque, which can clog arteries and lead to heart disease and stroke. When you control your cholesterol you are giving your arteries their best chance to remain clear. It is recommended that you get cholesterol lab work done once a year. 3) Reduce blood sugar - most of the food we eat is turning into glucose or blood sugar that our body uses for energy. Over time, high levels of blood sugar can damage your heart, kidneys, eyes and nerves. 4) Get active - living an active life is one of the most rewarding gifts you can give yourself and those you love. Simply put, daily physical activity increases your length and quality of life. Strive to exercise 15 minutes most days of the week.   5) Eat better - A healthy diet is one of your best weapons for fighting cardiovascular disease. When you eat a heart healthy diet, you improve your chances for feeling good and staying healthy for life. 6)  Lose weight - when you shed extra fat an unnecessary pounds, you reduce the burden on your hear, lungs, blood vessels and skeleton. You give yourself the gift of active living, you lower your blood pressure and help yourself feel better. 7) Stop smoking - cigarette smokers have a higher risk of developing cardiovascular disease. If  You smoke, quitting is the best thing you can do for your health. Check American Heart Association on line for more information on Life's Simple 7 and tips for healthy living. A Healthy Heart: Care Instructions  Your Care Instructions     Coronary artery disease, also called heart disease, occurs when a substance called plaque builds up in the vessels that supply oxygen-rich blood to your heart muscle. This can narrow the blood vessels and reduce blood flow. A heart attack happens when blood flow is completely blocked. A high-fat diet, smoking, and other factors increase the risk of heart disease. Your doctor has found that you have a chance of having heart disease. You can do lots of things to keep your heart healthy. It may not be easy, but you can change your diet, exercise more, and quit smoking. These steps really work to lower your chance of heart disease. Follow-up care is a key part of your treatment and safety. Be sure to make and go to all appointments, and call your doctor if you are having problems. It's also a good idea to know your test results and keep a list of the medicines you take. How can you care for yourself at home? Diet  · Use less salt when you cook and eat. This helps lower your blood pressure. Taste food before salting. Add only a little salt when you think you need it. With time, your taste buds will adjust to less salt.   · Eat fewer snack items, problem with your medicine. · If your doctor recommends aspirin, take the amount directed each day. Make sure you take aspirin and not another kind of pain reliever, such as acetaminophen (Tylenol). When should you call for help? [id number] if you have symptoms of a heart attack. These may include:  · Chest pain or pressure, or a strange feeling in the chest.  · Sweating. · Shortness of breath. · Pain, pressure, or a strange feeling in the back, neck, jaw, or upper belly or in one or both shoulders or arms. · Lightheadedness or sudden weakness. · A fast or irregular heartbeat. After you call 911, the  may tell you to chew 1 adult-strength or 2 to 4 low-dose aspirin. Wait for an ambulance. Do not try to drive yourself. Watch closely for changes in your health, and be sure to contact your doctor if you have any problems. Where can you learn more? Go to https://SocialBro.Jump On It. org and sign in to your [employer] account. Enter H706 in the Online-OR box to learn more about \"A Healthy Heart: Care Instructions. \"     If you do not have an account, please click on the \"Sign Up Now\" link. Current as of: December 16, 2019               Content Version: 12.5  © 1085-0504 [employer]. Care instructions adapted under license by [facility] ([facility]). If you have questions about a medical condition or this instruction, always ask your healthcare professional. [place] [address] any warranty or liability for your use of this information. Patient Education        Learning About High Blood Pressure  What is high blood pressure? Blood pressure is a measure of how hard the blood pushes against the walls of your arteries. It's normal for blood pressure to go up and down throughout the day. But if it stays up, you have high blood pressure. Another name for high blood pressure is hypertension. Two numbers tell you your blood pressure.  The first number is the systolic pressure (top number). It shows how hard the blood pushes when your heart is pumping. The second number is the diastolic pressure (bottom number). It shows how hard the blood pushes between heartbeats, when your heart is relaxed and filling with blood. Your doctor will give you a goal for your blood pressure based on your health and your age. High blood pressure (hypertension) means that the top number stays high, or the bottom number stays high, or both. High blood pressure increases the risk of stroke, heart attack, and other problems. What happens when you have high blood pressure? · Blood flows through your arteries with too much force. Over time, this can damage the heart and the walls of your arteries. But you can't feel it. High blood pressure usually doesn't cause symptoms. · High blood pressure makes your heart work harder. And that can lead to heart failure, which means your heart doesn't pump as much blood as your body needs. · Fat and calcium start to build up in your arteries. This buildup is called hardening of the arteries. It can cause many problems including a heart attack and stroke. · Arteries also carry blood and oxygen to organs like your eyes, kidneys, and brain. If high blood pressure damages those arteries, it can lead to vision loss, kidney disease, stroke, and a higher risk of dementia. How can you prevent high blood pressure? · Stay at a healthy weight. · Try to limit how much sodium you eat to less than 2,300 milligrams (mg) a day. If you limit your sodium to 1,500 mg a day, you can lower your blood pressure even more. ? Buy foods that are labeled \"unsalted,\" \"sodium-free,\" or \"low-sodium. \" Foods labeled \"reduced-sodium\" and \"light sodium\" may still have too much sodium. ? Flavor your food with garlic, lemon juice, onion, vinegar, herbs, and spices instead of salt. Do not use soy sauce, steak sauce, onion salt, garlic salt, mustard, or ketchup on your food. ?  Use less salt (or none) when recipes call for it. You can often use half the salt a recipe calls for without losing flavor. · Be physically active. Get at least 30 minutes of exercise on most days of the week. Walking is a good choice. You also may want to do other activities, such as running, swimming, cycling, or playing tennis or team sports. · Limit alcohol to 2 drinks a day for men and 1 drink a day for women. · Eat plenty of fruits, vegetables, and low-fat dairy products. Eat less saturated and total fats. How is high blood pressure treated? · Your doctor will suggest making lifestyle changes to help your heart. For example, your doctor may ask you to eat healthy foods, quit smoking, lose extra weight, and be more active. · If lifestyle changes don't help enough, your doctor may recommend that you take medicine. · When blood pressure is very high, medicines are needed to lower it. Follow-up care is a key part of your treatment and safety. Be sure to make and go to all appointments, and call your doctor if you are having problems. It's also a good idea to know your test results and keep a list of the medicines you take. Where can you learn more? Go to https://[employer].mylearnadfriend. org and sign in to your [employer] account. Enter P501 in the [facility] box to learn more about \"Learning About High Blood Pressure. \"     If you do not have an account, please click on the \"Sign Up Now\" link. Current as of: April 29, 2021               Content Version: 13.1  © 9972-9335 [employer]. Care instructions adapted under license by [facility] ([facility]). If you have questions about a medical condition or this instruction, always ask your healthcare professional. [place] [address] any warranty or liability for your use of this information.

## 2022-03-23 NOTE — PROGRESS NOTES
[facility], [place]. [address] [facility]  [phone] office  [phone] fax      OFFICE VISIT:  3/23/2022    Jeanette Alcaraz Block - : 1950  Reason For Visit:  Grecia Marrufo is a 73 y.o. female who is here for Follow-up and Hypertension    History:   Diagnosis Orders   1. Essential hypertension     2. Palpitations     3. Grade I diastolic dysfunction     4. Mild aortic regurgitation       The patient presents today for cardiology follow up. She hs been having issues with BP control and medication in tolerance. The patient discontinued Plendil and reports palpitations resolved. She is now taking Lopressor 25 mg 1/2 tab twice daily. Her blood pressure is much better, however, she is now having dyspepsia and worsened insomnia. The patient is very active and is also doing a breathing program which is helpful. A recent Lexiscan was negative for myocardial ischemia. A Zio cardiac monitor was uneventful other then occasional PACs. The patient reports today \"I have had those for years but don't even notice them now after stopping the Plendil. \"   The patient denies symptoms to suggest myocardial ischemia, heart failure or arrhythmia. BP log shows most BP reading less than 130/80. The patient's PCP monitors cholesterol. Natan Dieter denies exertional chest pain, shortness of breath, orthopnea, paroxysmal nocturnal dyspnea, syncope, presyncope, sensed arrhythmia, edema and fatigue. The patient denies numbness or weakness to suggest cerebrovascular accident or transient ischemic attack. + dyspepsia.  + insomnia.     Anila Fontana has the following history as [facility] in [facility]:  Patient Active Problem List   Diagnosis Code    History of colonic polyps Z86.010    Intermittent constipation K59.09    Valvular heart disease I38    Essential hypertension I10    LVH (left ventricular hypertrophy) I51.7    Chest tightness R07.89     Past Medical History:   Diagnosis Date    Arthritis     osteoarthritis    Colon polyps     FHx: migraine headaches     Heart disease     leaky valve    Hyperlipidemia     Joint pain     Lipids abnormal      Past Surgical History:   Procedure Laterality Date    CHOLECYSTECTOMY      COLONOSCOPY  06/2011    Dr. Justo Carroll COLONOSCOPY  11/15/2016    Dr De León Flash yr recall    COLONOSCOPY N/A 11/15/2016    COLONOSCOPY performed by Sam Vaz DO at [address] [facility]    COLONOSCOPY N/A 12/14/2021    Dr Butler Gram, Sub prep fair, (-)Micro Colitis, Mod to severe diverticulosis, Int hemorrhoids Gr 1 wo bleeding, 3 year recall    HYSTERECTOMY      KNEE ARTHROSCOPY Left     KNEE ARTHROSCOPY Right 01/27/2016    KNEE ARTHROSCOPY WITH SUBCHONDROPLASTY  performed by Delfina Da Silva MD at [address] [facility] Right 2017     Family History   Problem Relation Age of Onset    Other Mother         HEART FAILURE    Breast Cancer Mother 48    Heart Disease Father 79        bypass    Prostate Cancer Father 79    Colon Cancer Neg Hx     Colon Polyps Neg Hx     Esophageal Cancer Neg Hx     Liver Cancer Neg Hx     Liver Disease Neg Hx     Rectal Cancer Neg Hx     Stomach Cancer Neg Hx      Social History     Tobacco Use    Smoking status: Never Smoker    Smokeless tobacco: Never Used   Substance Use Topics    Alcohol use: Yes     Comment:  SOCIALLY Rare       Current Outpatient Medications   Medication Sig Dispense Refill    metoprolol tartrate (LOPRESSOR) 25 MG tablet Take 25 mg by mouth daily 1/2 tab daily      fluticasone (FLONASE) 50 MCG/ACT nasal spray 1 spray by Each Nostril route daily      Cholecalciferol (VITAMIN D) 50 MCG (2000 UT) CAPS capsule Take by mouth daily       Omega-3 Fatty Acids (FISH OIL) 1000 MG CAPS Take 1,200 mg by mouth as needed       Probiotic Product (PROBIOTIC-10 PO) Take by mouth daily as needed       conjugated estrogens (PREMARIN) 0.625 MG/GM vaginal cream Place vaginally as needed Place vaginally daily.  Multiple Vitamins-Minerals (VISION FORMULA PO) Take by mouth daily as needed       Fexofenadine HCl (ALLEGRA PO) Take 180 mg by mouth daily as needed       amitriptyline (ELAVIL) 10 MG tablet Take 10 mg by mouth nightly. No current facility-administered medications for this visit. Allergies: Felodipine, Irbesartan, Atorvastatin, Atenolol, Codeine, and Lisinopril    Review of Systems  Constitutional  no appetite change, or unexpected weight change. No fever, chills or diaphoresis. No significant change in activity level or new onset of fatigue. HEENT  no significant rhinorrhea or epistaxis. No tinnitus or significant hearing loss. Eyes  no sudden vision change or amaurosis. No corneal arcus, xantholasma, subconjunctival hemorrhage or discharge. Respiratory  no significant wheezing, stridor, apnea or cough. No dyspnea on exertion or shortness of air. Cardiovascular  no exertional chest pain to suggest myocardial ischemia. No orthopnea or PND. No sensation of sustained arrythmia. No occurrence of slow heart rate. No palpitations. No claudication. Gastrointestinal  no abdominal swelling or pain. No blood in stool. No severe constipation, diarrhea, nausea, or vomiting.  + dyspepsia onset with taking Lopressor. Genitourinary  no dysuria, frequency, or urgency. No flank pain or hematuria. Musculoskeletal  no back pain or myalgia. No problems with gait. Extremities - no clubbing, cyanosis or extremity edema. Skin  no color change or rash. No pallor. No new surgical incision. Neurologic  no speech difficulty, facial asymmetry or lateralizing weakness. No seizures, presyncope or syncope. No significant dizziness. Hematologic  no easy bruising or excessive bleeding. Psychiatric  no severe anxiety. No confusion. + worsened insomnia on Lopressor.   All other review of systems are negative. Objective  Vital Signs - /88 (Site: Right Upper Arm, Position: Sitting)   Pulse 68   Ht 5' 2\" (1.575 m)   Wt 147 lb (66.7 kg)   SpO2 98%   BMI 26.89 kg/m²   General - Luz is alert, cooperative, and pleasant. Well groomed. No acute distress. Body habitus - Body mass index is 26.89 kg/m². HEENT  Head is normocephalic. No circumoral cyanosis. Dentition is normal.  EYES -   Lids normal without ptosis. No discharge, edema or subconjunctival hemorrhage. Neck - Symmetrical without apparent mass or lymphadenopathy. Respiratory - Normal respiratory effort without use of accessory muscles. Ausculatation reveals vesicular breath sounds without crackles, wheezes, rub or rhonchi. Cardiovascular  No jugular venous distention. Auscultation reveals regular rate and rhythm. No audible clicks, gallop or rub. No murmur. No lower extremity varicosities. No carotid bruits. Abdominal -  No visible distention, mass or pulsations. Extremities - No clubbing or cyanosis. No statis dermatitis or ulcers. No edema. Musculoskeletal -   No Osler's nodes. No kyphosis or scoliosis. Gait is even and regular without limp or shuffle. Ambulates without assistance. Skin -  Warm and dry; no rash or pallor. No new surgical wound. Neurological - No focal neurological deficits. Thought processes coherent. No apparent tremor. Oriented to person, place and time. Psychiatric -  Appropriate affect and mood. Data reviewed:  3/22/22 Lexiscan  Impression   Impression:   There is no obvious infarct or ischemia, with a calculated ejection   fraction of 83 %. Suggest: Clinical correlation with consideration for medical   management. Signed by Dr Berny Mathews     3/18/22 echo   Normal left ventricular size with preserved LV function and an estimated  ejection fraction of approximately 55-60%. No definite regional wall  motion abnormalities.  The average global longitudinal strain is normal (-18.6%). Mild concentric left ventricular hypertrophy. Normal diastolic filling pattern for age. Normal right ventricular size with preserved RV function. Mildly sclerotic aortic valve. Mild aortic regurgitation. No stenosis is  noted. No evidence of significant pericardial effusion is noted. Aortic root and ascending aorta are within normal limits. The rhythm is sinus. Normal hemodynamic parameters; SVI 55 ml/m2, CI 3.13 L/min/m2, SVR 1,426  dynes/sec/cm-5. Electronically signed by Alvarez Urias(Interpreting physician)  on 03/18/2022 01:54 PM    YAYAO report reviewed  Analysis time 6 days and 14 hours from 3/7/2022 to 3/14/2022  Underlying rhythm normal sinus rhythm  Average heart rate 74 bpm  Minimum heart rate 54 bpm at 4:47 AM  Max heart rate 109 bpm  No VT, pauses, second or third-degree heart block, A. fib or SVT. Occasional PAC and rare PVC. No patient triggers. 1 diary entry for sinus rhythm and PACs. BP Readings from Last 3 Encounters:   03/23/22 132/88   03/07/22 (!) 138/90   01/12/22 139/80    Pulse Readings from Last 3 Encounters:   03/23/22 68   03/07/22 77   01/12/22 77        Wt Readings from Last 3 Encounters:   03/23/22 147 lb (66.7 kg)   03/07/22 146 lb (66.2 kg)   01/12/22 149 lb (67.6 kg)     Assessment/Plan:   Diagnosis Orders   1. Essential hypertension     2. Palpitations     3. Grade I diastolic dysfunction     4. Mild aortic regurgitation       Stable CV status without overt heart failure, sensed arrhythmia or angina. HTN - reviewed home BP log with patient and scanned under media. Overall, improved with most readings less than 130/80. However, Lopressor causing dyspepsia and insomnia. Discussed options and patient opted to try Bystolic 2.5 mg (1) tab daily. Lopressor discontinued. Continue home BP log. Palpitations - asymptomatic once Plendil was discontinued. Mild AR - follow periodically with echo.     Patient is compliant with medication regimen. Previous cardiac history and records reviewed. Continue current medical management for cardiac related condition. Continue other current medications as directed. Continue to follow up with primary care provider for non cardiac medical problems. If your primary care provider is outside of the [facility], please request that your labs be faxed to this office at [phone]. BP goal 130/80 or less. Call the office with any problems, questions or concerns at [phone]. Cardiology follow up as scheduled in [address] appointments. Educational included in patient instructions. Heart health.       Liliana Rankin, APRN

## 2022-03-23 NOTE — TELEPHONE ENCOUNTER
----- Message from ASHLEY Alvarado sent at 3/22/2022  5:30 PM CDT -----  Can you let Ailin Han know that I reviewed her Lexiscan report and it looked good. Normal EF with no prior heart attack or decreased blood flow to her heart.   Thanks, [place]

## 2022-04-07 ENCOUNTER — TELEMEDICINE (OUTPATIENT)
Dept: CARDIOLOGY CLINIC | Age: 72
End: 2022-04-07
Payer: MEDICARE

## 2022-04-07 DIAGNOSIS — I10 ESSENTIAL HYPERTENSION: Primary | ICD-10-CM

## 2022-04-07 PROCEDURE — 99441 PR PHYS/QHP TELEPHONE EVALUATION 5-10 MIN: CPT | Performed by: NURSE PRACTITIONER

## 2022-04-07 RX ORDER — MULTIVITAMIN/IRON/FOLIC ACID 18MG-0.4MG
250 TABLET ORAL PRN
COMMUNITY
End: 2022-09-07 | Stop reason: ALTCHOICE

## 2022-04-07 NOTE — PATIENT INSTRUCTIONS
New instructions for today:  Monitor your blood pressure twice daily and keep a log. Your blood pressure goal is 130/80 or less. We will discuss in 2 weeks by either scheduled telephone encounter or patient call back. Office phone number [phone]. Patient Instructions:  Continue current medications as prescribed. Always keep a current medication list. Bring your medications to every office visit. Continue to follow up with primary care provider for non cardiac medical problems. Call the office with any problems, questions or concerns at [phone]. If you have been asked to keep a blood pressure log, do so for 2 weeks. Call the office to report readings to the triage nurse at [phone]. Follow up with cardiologist as scheduled. The following educational material has been included in this after visit summary for your review: Life simple 7. Heart health. Life simple 7  1) Manage blood pressure - high blood pressure is a major risk factor for heart disease and stroke. Keeping blood pressure in health range reduces strain on your heart, arteries and kidneys. Blood pressure goal is less than 130/80. 2) Control cholesterol - contributes to plaque, which can clog arteries and lead to heart disease and stroke. When you control your cholesterol you are giving your arteries their best chance to remain clear. It is recommended that you get cholesterol lab work done once a year. 3) Reduce blood sugar - most of the food we eat is turning into glucose or blood sugar that our body uses for energy. Over time, high levels of blood sugar can damage your heart, kidneys, eyes and nerves. 4) Get active - living an active life is one of the most rewarding gifts you can give yourself and those you love. Simply put, daily physical activity increases your length and quality of life. Strive to exercise 15 minutes most days of the week.   5)  Eat better - A healthy diet is one of your best weapons for fighting cardiovascular disease. When you eat a heart healthy diet, you improve your chances for feeling good and staying healthy for life. 6)  Lose weight - when you shed extra fat an unnecessary pounds, you reduce the burden on your hear, lungs, blood vessels and skeleton. You give yourself the gift of active living, you lower your blood pressure and help yourself feel better. 7) Stop smoking - cigarette smokers have a higher risk of developing cardiovascular disease. If  You smoke, quitting is the best thing you can do for your health. Check American Heart Association on line for more information on Life's Simple 7 and tips for healthy living. A Healthy Heart: Care Instructions  Your Care Instructions     Coronary artery disease, also called heart disease, occurs when a substance called plaque builds up in the vessels that supply oxygen-rich blood to your heart muscle. This can narrow the blood vessels and reduce blood flow. A heart attack happens when blood flow is completely blocked. A high-fat diet, smoking, and other factors increase the risk of heart disease. Your doctor has found that you have a chance of having heart disease. You can do lots of things to keep your heart healthy. It may not be easy, but you can change your diet, exercise more, and quit smoking. These steps really work to lower your chance of heart disease. Follow-up care is a key part of your treatment and safety. Be sure to make and go to all appointments, and call your doctor if you are having problems. It's also a good idea to know your test results and keep a list of the medicines you take. How can you care for yourself at home? Diet  · Use less salt when you cook and eat. This helps lower your blood pressure. Taste food before salting. Add only a little salt when you think you need it. With time, your taste buds will adjust to less salt.   · Eat fewer snack items, fast foods, canned soups, and other high-salt, high-fat, processed foods. · Read food labels and try to avoid saturated and trans fats. They increase your risk of heart disease by raising cholesterol levels. · Limit the amount of solid fat-butter, margarine, and shortening-you eat. Use olive, peanut, or canola oil when you cook. Bake, broil, and steam foods instead of frying them. · Eat a variety of fruit and vegetables every day. Dark green, deep orange, red, or yellow fruits and vegetables are especially good for you. Examples include spinach, carrots, peaches, and berries. · Foods high in fiber can reduce your cholesterol and provide important vitamins and minerals. High-fiber foods include whole-grain cereals and breads, oatmeal, beans, brown rice, citrus fruits, and apples. · Eat lean proteins. Heart-healthy proteins include seafood, lean meats and poultry, eggs, beans, peas, nuts, seeds, and soy products. · Limit drinks and foods with added sugar. These include candy, desserts, and soda pop. Lifestyle changes  · If your doctor recommends it, get more exercise. Walking is a good choice. Bit by bit, increase the amount you walk every day. Try for at least 30 minutes on most days of the week. You also may want to swim, bike, or do other activities. · Do not smoke. If you need help quitting, talk to your doctor about stop-smoking programs and medicines. These can increase your chances of quitting for good. Quitting smoking may be the most important step you can take to protect your heart. It is never too late to quit. · Limit alcohol to 2 drinks a day for men and 1 drink a day for women. Too much alcohol can cause health problems. · Manage other health problems such as diabetes, high blood pressure, and high cholesterol. If you think you may have a problem with alcohol or drug use, talk to your doctor. Medicines  · Take your medicines exactly as prescribed. Call your doctor if you think you are having a problem with your medicine.   · If your doctor recommends

## 2022-04-07 NOTE — PROGRESS NOTES
Cam Peralta is a 73 y.o. female evaluated via telephone on 4/7/2022. Consent:  She and/or health care decision maker is aware that that she may receive a bill for this telephone service, depending on her insurance coverage, and has provided verbal consent to proceed: Yes    Documentation:  I communicated with the patient and/or health care decision maker about HTN. Details of this discussion including any medical advice provided: Heart health. I affirm this is a Patient Initiated Episode with an Established Patient who has not had a related appointment within my department in the past 7 days or scheduled within the next 24 hours. Total Time: minutes: 5-10 minutes    Note: not billable if this call serves to triage the patient into an appointment for the relevant concern    ASHLEY Sidhu      4/7/2022    Audio Patient Encouter([facility])  The telephone encourter was conducted with patient in their residence from [address] with ASHLEY Ferguson; assistance by Max Jackson MA.    HPI:  Luz Humphries   Diagnosis Orders   1. Essential hypertension       The patient presents for a phone visit regarding BP management. Elina Loera is very sensitive to medications and has been unable to tolerate anti-hypertensive therapy thsu far. At last Crissy Hanna was started at 2.5 mg daily. She tolerated the medication fairly well at 1/2 tab daily reporting \"it just really flared up my Raynaud's. \"  The patient is very active exercising regularly along with healthy eating. She has done a lot of research on a breathing device called Resperate which prolongs exhalation phase and lowers BP. The patient has been using the device 15 minutes each morning and feels it is helping. BP this AM was 130/70. She is wanting to stop the Bystolic due to issues with Raynaud's. The patient presents today for audio evaluation.    The patient denies symptoms to suggest myocardial ischemia, heart failure or arrhythmia. The patient's PCP monitors cholesterol. SUBJECTIVE:  Victor Manuel Beckman denies exertional chest pain, shortness of breath, orthopnea, paroxysmal nocturnal dyspnea, syncope, presyncope, sensed arrhythmia, edema and fatigue. The patient denies numbness or weakness to suggest cerebrovascular accident or transient ischemic attack. Review of Systems    Prior to Visit Medications    Medication Sig Taking? Authorizing Provider   Magnesium Oxide (MAGNESIUM-OXIDE) 250 MG TABS tablet Take 250 mg by mouth as needed Yes Historical Provider, MD   nebivolol (BYSTOLIC) 2.5 MG tablet Take 1 tablet by mouth daily  Patient taking differently: Take 2.5 mg by mouth daily Patient is taking 1/2 tablet Yes [place] Ser, APRN   fluticasone (FLONASE) 50 MCG/ACT nasal spray 1 spray by Each Nostril route daily Yes Historical Provider, MD   Cholecalciferol (VITAMIN D) 50 MCG (2000 UT) CAPS capsule Take by mouth daily  Yes Historical Provider, MD   Omega-3 Fatty Acids (FISH OIL) 1000 MG CAPS Take 1,200 mg by mouth as needed  Yes Historical Provider, MD   Probiotic Product (PROBIOTIC-10 PO) Take by mouth daily as needed  Yes Historical Provider, MD   conjugated estrogens (PREMARIN) 0.625 MG/GM vaginal cream Place vaginally as needed Place vaginally daily. Yes Historical Provider, MD   Multiple Vitamins-Minerals (VISION FORMULA PO) Take by mouth daily as needed  Yes Historical Provider, MD   Fexofenadine HCl (ALLEGRA PO) Take 180 mg by mouth daily as needed  Yes Historical Provider, MD   amitriptyline (ELAVIL) 10 MG tablet Take 10 mg by mouth nightly. Yes Historical Provider, MD       Social History     Tobacco Use    Smoking status: Never Smoker    Smokeless tobacco: Never Used   Vaping Use    Vaping Use: Never used   Substance Use Topics    Alcohol use: Yes     Comment:  SOCIALLY Rare     Drug use: No        REVIEW OF SYSTEMS:  Constitutional - no appetite change, or unexpected weight change.  No fever, chills or diaphoresis. No significant change in activity level or new onset of fatigue. HEENT - no significant rhinorrhea or epistaxis. No tinnitus or significant hearing loss. Eyes - no sudden vision change or amaurosis. No corneal arcus, xantholasma, subconjunctival hemorrhage or discharge. Respiratory - no significant wheezing, stridor, apnea or cough. No dyspnea on exertion or shortness of air. Cardiovascular - no exertional chest pain to suggest myocardial ischemia. No orthopnea or PND. No sensation of sustained arrythmia. No occurrence of slow heart rate. No palpitations. No claudication. Gastrointestinal - no abdominal swelling or pain. No blood in stool. No severe constipation, diarrhea, nausea, or vomiting. Genitourinary - no dysuria, frequency, or urgency. No flank pain or hematuria. Musculoskeletal - no back pain or myalgia. No problems with gait. Extremities - no clubbing, cyanosis or extremity edema. Skin - no color change or rash. No pallor. No new surgical incision. Neurologic - no speech difficulty, facial asymmetry or lateralizing weakness. No seizures, presyncope or syncope. No significant dizziness. Hematologic - no easy bruising or excessive bleeding. Psychiatric - no severe anxiety or insomnia. No confusion. All other review of systems are negative. DATA REVIEWED:  3/22/22 Lexiscan  Impression:   There is no obvious infarct or ischemia, with a calculated ejection   fraction of 83 %. Suggest: Clinical correlation with consideration for medical   management. Signed by Dr Bairon Mcrae     3/18/22 echo   Normal left ventricular size with preserved LV function and an estimated  ejection fraction of approximately 55-60%. No definite regional wall  motion abnormalities. The average global longitudinal strain is normal  (-18.6%). Mild concentric left ventricular hypertrophy. Normal diastolic filling pattern for age.    Normal right ventricular size with preserved RV function. Mildly sclerotic aortic valve. Mild aortic regurgitation. No stenosis is  noted. No evidence of significant pericardial effusion is noted. Aortic root and ascending aorta are within normal limits. The rhythm is sinus. Normal hemodynamic parameters; SVI 55 ml/m2, CI 3.13 L/min/m2, SVR 1,426  dynes/sec/cm-5. Electronically signed by Azul Urias(Interpreting physician)  on 03/18/2022 01:54 PM    Lab Results   Component Value Date    WBC 3.8 (L) 08/23/2016    HGB 14.6 08/23/2016    HCT 42.1 08/23/2016    MCV 90.9 08/23/2016     08/23/2016     Lab Results   Component Value Date     08/16/2016    K 4.1 08/16/2016     08/16/2016    CO2 25 08/16/2016    BUN 25 (H) 08/16/2016    CREATININE 0.9 08/16/2016    GLUCOSE 107 08/16/2016    CALCIUM 9.6 08/16/2016    PROT 7.1 08/16/2016    LABALBU 4.3 08/16/2016    BILITOT 0.3 08/16/2016    ALKPHOS 94 08/16/2016    AST 37 (H) 08/16/2016    ALT 47 (H) 08/16/2016    LABGLOM >60 08/16/2016    GLOB 2.8 08/16/2016       Lab Results   Component Value Date    CHOL 304 (H) 08/16/2016    CHOL 216 (H) 01/15/2016     Lab Results   Component Value Date    TRIG 83 (L) 08/16/2016    TRIG 87 (L) 01/15/2016     Lab Results   Component Value Date    HDL 92 08/16/2016    HDL 96 01/15/2016     Lab Results   Component Value Date    LDLCALC 195 08/16/2016     ASSESSMENT/PlAN:   Diagnosis Orders   1. Essential hypertension       Stable cardiac status with no overt heart failure, angina or sensed arrhythmia. HTN - patient is currently taking Bystolic 2.5 mg 1/2 tab daily and wishes to stop due to worsening Raynaud's on beta blocker. She will take 1/2 tab every other day for 3 doses then stop. The patient has thoroughly researched a device called Resperate breathing machine to lower blood pressure. Continue home BP log. Goal less than 130/80. Patient compliant with medication regimen.     Continue current medical management for cardiac condition. Continue current medications as prescribed. BP goal is 130/80 or less. If your primary care provider is outside of the [facility], please request your labs be faxed to this office at [phone]. Continue to follow up with primary care provider for non cardiac medical problems. Call the office with any problems, questions or concerns at [phone]. Follow up with cardiologist as scheduled in [facility] The following educational material has been included in this after visit summary for patient review:  Heart health. Raymundo Rain is a 73 y.o. female being evaluated by a telephone encounter to address concerns as mentioned above. A caregiver was present when appropriate. Due to this being a TeleHealth encounter (During [facility] emergency). Pursuant to the emergency declaration under the [address] [place] [address], [address] [place] [address] waiver authority and the [employer] Act, this Virtual Visit was conducted with patient's (and/or legal guardian's) consent, to reduce the patient's risk of exposure to COVID-19 and provide necessary medical care. The patient (and/or legal guardian) has also been advised to contact this office for worsening conditions or problems, and seek emergency medical treatment and/or call 911 if deemed necessary. Services were provided through a telephone discussion virtually to substitute for in-person clinic visit. Patient and provider were located at their individual homes. --ASHLEY Peres on 4/7/2022 at 9:04 AM    An electronic signature was used to authenticate this note.

## 2022-05-26 ENCOUNTER — SCHEDULED TELEPHONE ENCOUNTER (OUTPATIENT)
Dept: CARDIOLOGY CLINIC | Age: 72
End: 2022-05-26
Payer: MEDICARE

## 2022-05-26 DIAGNOSIS — I10 ESSENTIAL HYPERTENSION: Primary | ICD-10-CM

## 2022-05-26 DIAGNOSIS — I51.7 LVH (LEFT VENTRICULAR HYPERTROPHY): ICD-10-CM

## 2022-05-26 PROCEDURE — 99441 PR PHYS/QHP TELEPHONE EVALUATION 5-10 MIN: CPT | Performed by: NURSE PRACTITIONER

## 2022-05-26 NOTE — PATIENT INSTRUCTIONS
New instructions for today:    Patient Instructions:  Continue current medications as prescribed. Always keep a current medication list. Bring your medications to every office visit. Continue to follow up with primary care provider for non cardiac medical problems. Call the office with any problems, questions or concerns at [phone]. If you have been asked to keep a blood pressure log, do so for 2 weeks. Call the office to report readings to the triage nurse at [phone]. Follow up with cardiologist as scheduled. The following educational material has been included in this after visit summary for your review: Life simple 7. Heart health. Life simple 7  1) Manage blood pressure - high blood pressure is a major risk factor for heart disease and stroke. Keeping blood pressure in health range reduces strain on your heart, arteries and kidneys. Blood pressure goal is less than 130/80. 2) Control cholesterol - contributes to plaque, which can clog arteries and lead to heart disease and stroke. When you control your cholesterol you are giving your arteries their best chance to remain clear. It is recommended that you get cholesterol lab work done once a year. 3) Reduce blood sugar - most of the food we eat is turning into glucose or blood sugar that our body uses for energy. Over time, high levels of blood sugar can damage your heart, kidneys, eyes and nerves. 4) Get active - living an active life is one of the most rewarding gifts you can give yourself and those you love. Simply put, daily physical activity increases your length and quality of life. Strive to exercise 15 minutes most days of the week. 5)  Eat better - A healthy diet is one of your best weapons for fighting cardiovascular disease. When you eat a heart healthy diet, you improve your chances for feeling good and staying healthy for life.   6)  Lose weight - when you shed extra fat an unnecessary pounds, you reduce the burden on your hear, lungs, blood vessels and skeleton. You give yourself the gift of active living, you lower your blood pressure and help yourself feel better. 7) Stop smoking - cigarette smokers have a higher risk of developing cardiovascular disease. If  You smoke, quitting is the best thing you can do for your health. Check American Heart Association on line for more information on Life's Simple 7 and tips for healthy living. A Healthy Heart: Care Instructions  Your Care Instructions     Coronary artery disease, also called heart disease, occurs when a substance called plaque builds up in the vessels that supply oxygen-rich blood to your heart muscle. This can narrow the blood vessels and reduce blood flow. A heart attack happens when blood flow is completely blocked. A high-fat diet, smoking, and other factors increase the risk of heart disease. Your doctor has found that you have a chance of having heart disease. You can do lots of things to keep your heart healthy. It may not be easy, but you can change your diet, exercise more, and quit smoking. These steps really work to lower your chance of heart disease. Follow-up care is a key part of your treatment and safety. Be sure to make and go to all appointments, and call your doctor if you are having problems. It's also a good idea to know your test results and keep a list of the medicines you take. How can you care for yourself at home? Diet  · Use less salt when you cook and eat. This helps lower your blood pressure. Taste food before salting. Add only a little salt when you think you need it. With time, your taste buds will adjust to less salt. · Eat fewer snack items, fast foods, canned soups, and other high-salt, high-fat, processed foods. · Read food labels and try to avoid saturated and trans fats. They increase your risk of heart disease by raising cholesterol levels. · Limit the amount of solid fat-butter, margarine, and shortening-you eat.  Use olive, peanut, or canola oil when you cook. Bake, broil, and steam foods instead of frying them. · Eat a variety of fruit and vegetables every day. Dark green, deep orange, red, or yellow fruits and vegetables are especially good for you. Examples include spinach, carrots, peaches, and berries. · Foods high in fiber can reduce your cholesterol and provide important vitamins and minerals. High-fiber foods include whole-grain cereals and breads, oatmeal, beans, brown rice, citrus fruits, and apples. · Eat lean proteins. Heart-healthy proteins include seafood, lean meats and poultry, eggs, beans, peas, nuts, seeds, and soy products. · Limit drinks and foods with added sugar. These include candy, desserts, and soda pop. Lifestyle changes  · If your doctor recommends it, get more exercise. Walking is a good choice. Bit by bit, increase the amount you walk every day. Try for at least 30 minutes on most days of the week. You also may want to swim, bike, or do other activities. · Do not smoke. If you need help quitting, talk to your doctor about stop-smoking programs and medicines. These can increase your chances of quitting for good. Quitting smoking may be the most important step you can take to protect your heart. It is never too late to quit. · Limit alcohol to 2 drinks a day for men and 1 drink a day for women. Too much alcohol can cause health problems. · Manage other health problems such as diabetes, high blood pressure, and high cholesterol. If you think you may have a problem with alcohol or drug use, talk to your doctor. Medicines  · Take your medicines exactly as prescribed. Call your doctor if you think you are having a problem with your medicine. · If your doctor recommends aspirin, take the amount directed each day. Make sure you take aspirin and not another kind of pain reliever, such as acetaminophen (Tylenol). When should you call for help? [id number] if you have symptoms of a heart attack.  These may include:  · Chest pain or pressure, or a strange feeling in the chest.  · Sweating. · Shortness of breath. · Pain, pressure, or a strange feeling in the back, neck, jaw, or upper belly or in one or both shoulders or arms. · Lightheadedness or sudden weakness. · A fast or irregular heartbeat. After you call 911, the  may tell you to chew 1 adult-strength or 2 to 4 low-dose aspirin. Wait for an ambulance. Do not try to drive yourself. Watch closely for changes in your health, and be sure to contact your doctor if you have any problems. Where can you learn more? Go to https://[employer]. org and sign in to your [employer] account. Enter D516 in the [employer] box to learn more about \"A Healthy Heart: Care Instructions. \"     If you do not have an account, please click on the \"Sign Up Now\" link. Current as of: December 16, 2019               Content Version: 12.5  © 5878-0468 Healthwise, [employer]. Care instructions adapted under license by [facility] ([facility]). If you have questions about a medical condition or this instruction, always ask your healthcare professional. [address] any warranty or liability for your use of this information.

## 2022-05-26 NOTE — PROGRESS NOTES
Tammy Ellington is a 73 y.o. female evaluated via telephone on 5/26/2022. Consent:  She and/or health care decision maker is aware that that she may receive a bill for this telephone service, depending on her insurance coverage, and has provided verbal consent to proceed: Yes    Documentation:  I communicated with the patient and/or health care decision maker about HTN. Details of this discussion including any medical advice provided: heart health. I affirm this is a Patient Initiated Episode with an Established Patient who has not had a related appointment within my department in the past 7 days or scheduled within the next 24 hours. Total Time: minutes: 5-10 minutes    Note: not billable if this call serves to triage the patient into an appointment for the relevant concern    ASHLEY Gorman      5/26/2022    Audio Patient Encouter(During KRMIV-36 public health emergency)  The telephone encourter was conducted with patient in their residence from [address] with ASHLEY Flood Che; assistance by Mart Cueva MA.    HPI:  Luz Humphries   Diagnosis Orders   1. Essential hypertension     2. LVH (left ventricular hypertrophy)       The patient presents for telephone encounter regarding BP management. See BP log scanned under media. She continues to use alternative BP lowering device called Respirate every morning. Overall, her BP is well controlled. She bikes regularly with resting heart rate 69 and peak exercise mostly 130's with occasional 150's with more strenuous biking. The patient presents today for audio evaluation. The patient denies symptoms to suggest myocardial ischemia, heart failure or arrhythmia. BP is well controlled on current regimen. The patient's PCP monitors cholesterol. SUBJECTIVE:  Brian Yan denies exertional chest pain, shortness of breath, orthopnea, paroxysmal nocturnal dyspnea, syncope, presyncope, sensed arrhythmia, edema and fatigue.   The patient denies numbness or weakness to suggest cerebrovascular accident or transient ischemic attack. Review of Systems    Prior to Visit Medications    Medication Sig Taking? Authorizing Provider   Magnesium Oxide (MAGNESIUM-OXIDE) 250 MG TABS tablet Take 250 mg by mouth as needed Yes Historical Provider, MD   fluticasone (FLONASE) 50 MCG/ACT nasal spray 1 spray by Each Nostril route daily Yes Historical Provider, MD   Cholecalciferol (VITAMIN D) 50 MCG (2000 UT) CAPS capsule Take by mouth daily  Yes Historical Provider, MD   Omega-3 Fatty Acids (FISH OIL) 1000 MG CAPS Take 1,200 mg by mouth as needed  Yes Historical Provider, MD   Probiotic Product (PROBIOTIC-10 PO) Take by mouth daily as needed  Yes Historical Provider, MD   conjugated estrogens (PREMARIN) 0.625 MG/GM vaginal cream Place vaginally as needed Place vaginally daily. Yes Historical Provider, MD   Multiple Vitamins-Minerals (VISION FORMULA PO) Take by mouth daily as needed  Yes Historical Provider, MD   Fexofenadine HCl (ALLEGRA PO) Take 180 mg by mouth daily as needed  Yes Historical Provider, MD   amitriptyline (ELAVIL) 10 MG tablet Take 10 mg by mouth nightly. Yes Historical Provider, MD       Social History     Tobacco Use    Smoking status: Never Smoker    Smokeless tobacco: Never Used   Vaping Use    Vaping Use: Never used   Substance Use Topics    Alcohol use: Yes     Comment:  SOCIALLY Rare     Drug use: No        REVIEW OF SYSTEMS:  Constitutional - no appetite change, or unexpected weight change. No fever, chills or diaphoresis. No significant change in activity level or new onset of fatigue. HEENT - no significant rhinorrhea or epistaxis. No tinnitus or significant hearing loss. Eyes - no sudden vision change or amaurosis. No corneal arcus, xantholasma, subconjunctival hemorrhage or discharge. Respiratory - no significant wheezing, stridor, apnea or cough. No dyspnea on exertion or shortness of air.   Cardiovascular - no exertional chest pain to suggest myocardial ischemia. No orthopnea or PND. No sensation of sustained arrythmia. No occurrence of slow heart rate. No palpitations. No claudication. Gastrointestinal - no abdominal swelling or pain. No blood in stool. No severe constipation, diarrhea, nausea, or vomiting. Genitourinary - no dysuria, frequency, or urgency. No flank pain or hematuria. Musculoskeletal - no back pain or myalgia. No problems with gait. Extremities - no clubbing, cyanosis or extremity edema. Skin - no color change or rash. No pallor. No new surgical incision. Neurologic - no speech difficulty, facial asymmetry or lateralizing weakness. No seizures, presyncope or syncope. No significant dizziness. Hematologic - no easy bruising or excessive bleeding. Psychiatric - no severe anxiety or insomnia. No confusion. All other review of systems are negative. DATA REVIEWED:  3/22/22 Lexiscan  Impression:   There is no obvious infarct or ischemia, with a calculated ejection   fraction of 83 %. Suggest: Clinical correlation with consideration for medical   management. Signed by Dr Dolores Gupta      3/18/22 echo   Normal left ventricular size with preserved LV function and an estimated  ejection fraction of approximately 55-60%. No definite regional wall  motion abnormalities. The average global longitudinal strain is normal  (-18.6%). Mild concentric left ventricular hypertrophy.   Normal diastolic filling pattern for age.  Edgar Purpura right ventricular size with preserved RV function.   Mildly sclerotic aortic valve. Mild aortic regurgitation.  No stenosis is  noted.   No evidence of significant pericardial effusion is noted.   Aortic root and ascending aorta are within normal limits.   The rhythm is sinus.   Normal hemodynamic parameters; SVI 55 ml/m2, CI 3.13 L/min/m2, SVR 1,426  dynes/sec/cm-5.   Electronically signed by Jose Ramon Urias(Interpreting physician)  on 03/18/2022 01:54 PM    Lab Results   Component Value Date     WBC 3.8 (L) 08/23/2016     HGB 14.6 08/23/2016     HCT 42.1 08/23/2016     MCV 90.9 08/23/2016      08/23/2016            Lab Results   Component Value Date      08/16/2016     K 4.1 08/16/2016      08/16/2016     CO2 25 08/16/2016     BUN 25 (H) 08/16/2016     CREATININE 0.9 08/16/2016     GLUCOSE 107 08/16/2016     CALCIUM 9.6 08/16/2016     PROT 7.1 08/16/2016     LABALBU 4.3 08/16/2016     BILITOT 0.3 08/16/2016     ALKPHOS 94 08/16/2016     AST 37 (H) 08/16/2016     ALT 47 (H) 08/16/2016     LABGLOM >60 08/16/2016     GLOB 2.8 08/16/2016               Lab Results   Component Value Date     CHOL 304 (H) 08/16/2016     CHOL 216 (H) 01/15/2016            Lab Results   Component Value Date     TRIG 83 (L) 08/16/2016     TRIG 87 (L) 01/15/2016            Lab Results   Component Value Date     HDL 92 08/16/2016     HDL 96 01/15/2016            Lab Results   Component Value Date     LDLCALC 195 08/16/2016        ASSESSMENT/PlAN:   Diagnosis Orders   1. Essential hypertension     2. LVH (left ventricular hypertrophy)       Stable cardiac status with no overt heart failure, angina or sensed arrhythmia. HTN - currently normotensive. Continue same. BP goal less than 130/80. Patient compliant with medication regimen. Continue current medical management for cardiac condition. Continue current medications as prescribed. BP goal is 130/80 or less. If your primary care provider is outside of the [facility], please request your labs be faxed to this office at [phone]. Continue to follow up with primary care provider for non cardiac medical problems. Call the office with any problems, questions or concerns at [phone]. Follow up with cardiologist as scheduled in [address] The following educational material has been included in this after visit summary for patient review:  [facility].      Nasir Bradley is a 73 y.o. female being evaluated by a telephone encounter to address concerns as mentioned above. A caregiver was present when appropriate. Due to this being a TeleHealth encounter (During NCYRP-01 public health emergency). Pursuant to the emergency declaration under the [facility], [address] [place] [address] and the [employer], this Virtual Visit was conducted with patient's (and/or legal guardian's) consent, to reduce the patient's risk of exposure to COVID-19 and provide necessary medical care. The patient (and/or legal guardian) has also been advised to contact this office for worsening conditions or problems, and seek emergency medical treatment and/or call 911 if deemed necessary. Services were provided through a telephone discussion virtually to substitute for in-person clinic visit. Patient and provider were located at their individual homes. --ASHLEY Patel on 5/26/2022 at 9:17 AM    An electronic signature was used to authenticate this note.

## 2022-08-03 ENCOUNTER — HOSPITAL ENCOUNTER (OUTPATIENT)
Dept: GENERAL RADIOLOGY | Age: 72
Discharge: HOME | End: 2022-08-03
Payer: MEDICARE

## 2022-08-03 DIAGNOSIS — K21.9 GASTROESOPHAGEAL REFLUX DISEASE WITHOUT ESOPHAGITIS: ICD-10-CM

## 2022-08-03 PROCEDURE — 74220 X-RAY XM ESOPHAGUS 1CNTRST: CPT | Performed by: RADIOLOGY

## 2022-08-03 PROCEDURE — 74220 X-RAY XM ESOPHAGUS 1CNTRST: CPT

## 2022-09-07 ENCOUNTER — OFFICE VISIT (OUTPATIENT)
Dept: GASTROENTEROLOGY | Age: 72
End: 2022-09-07
Payer: MEDICARE

## 2022-09-07 VITALS
OXYGEN SATURATION: 98 % | WEIGHT: 148.4 LBS | HEIGHT: 62 IN | SYSTOLIC BLOOD PRESSURE: 138 MMHG | BODY MASS INDEX: 27.31 KG/M2 | HEART RATE: 89 BPM | DIASTOLIC BLOOD PRESSURE: 72 MMHG

## 2022-09-07 DIAGNOSIS — K21.9 GASTROESOPHAGEAL REFLUX DISEASE WITHOUT ESOPHAGITIS: Primary | ICD-10-CM

## 2022-09-07 DIAGNOSIS — R13.10 DYSPHAGIA, UNSPECIFIED TYPE: ICD-10-CM

## 2022-09-07 PROCEDURE — 99213 OFFICE O/P EST LOW 20 MIN: CPT | Performed by: NURSE PRACTITIONER

## 2022-09-07 PROCEDURE — 1123F ACP DISCUSS/DSCN MKR DOCD: CPT | Performed by: NURSE PRACTITIONER

## 2022-09-07 ASSESSMENT — ENCOUNTER SYMPTOMS
ABDOMINAL DISTENTION: 0
SHORTNESS OF BREATH: 0
NAUSEA: 0
DIARRHEA: 1
TROUBLE SWALLOWING: 1
COUGH: 0
CHOKING: 0
BLOOD IN STOOL: 0
ANAL BLEEDING: 0
VOMITING: 0
RECTAL PAIN: 0
CONSTIPATION: 0
ABDOMINAL PAIN: 0

## 2022-09-07 NOTE — PROGRESS NOTES
Subjective:     Patient ID: Los Downing is a 73 y.o. female  PCP: Livan Smart MD  Referring Provider: Marianna Chacon MD    HPI  Patient presents to the office today with the following complaints: Follow-up      Pt seen today for EGD. This started during COVID infection in June. She reports some issues swallowing, once every 2-3 weeks. Reflux is daily. She is scheduled to see Dr. Bhumi Antunez for EGD ;and BRAVO. Considering TIF procedure. She is currently taking TUMS prn and Pepcid prn. She has taken PPIs in the past and developed osteopenia. Last Colonoscopy 12/2021 - Sub prep fair, (-)Micro Colitis, Mod to severe diverticulosis, Int hemorrhoids Gr 1 wo bleeding, 3 year recall  Denies any family hx colon cancer or colon polyps    Narrative   EXAMINATION: Esophagram 8/3/2022   Fluoroscopy time: 1 minute 40 seconds. Dose: 0.309 mGym2. 42 images are submitted for interpretation. HISTORY: Gastroesophageal reflux disease without esophagitis. FINDINGS: A barium swallow was performed with oral ingestion of   contrast material. Rapid sequence imaging was obtained over the   cervical esophagus. There is a mildly prominent cricopharyngeus at the   C5 level without evidence of a Zenker's diverticulum and with only   transient holdup to passage of the contrast bolus. Tertiary contractions are noted in the middle and distal esophageal   segments resulting in some stasis of the barium column. No evidence of   mass or ulceration. There is a sliding-type hiatal hernia with a   mucosal ring. This results in transient holdup to passage of a 13 mm   barium tablet. There was moderate gastroesophageal reflux into the   upper esophageal segment in the supine position with the water siphon   test.       Impression   1.. Mildly prominent cricopharyngeus. No evidence of significant   holdup to passage of the barium bolus.    2. Dysmotility of the esophagus with tertiary contractions in the   middle and distal esophageal segments resulting in stasis of the   barium column. 3. Sliding type hiatal hernia with a mucosal ring. This results in   transient holdup to passage of a 13 mm barium tablet. There is   moderate gastroesophageal reflux into the upper esophageal segment in   the recumbent position with the water siphon test.   Signed by Dr Rl Duke:     1. Gastroesophageal reflux disease without esophagitis    2. Dysphagia, unspecified type            Plan:   - EGD recommended, pt states she already has this set up with Dr. William Rea  - Follow up prn or sooner if needed        Orders  No orders of the defined types were placed in this encounter. Medications  No orders of the defined types were placed in this encounter.         Patient History:     Past Medical History:   Diagnosis Date    Arthritis     osteoarthritis    Colon polyps     FHx: migraine headaches     Heart disease     leaky valve    Hyperlipidemia     Joint pain     Lipids abnormal        Past Surgical History:   Procedure Laterality Date    CHOLECYSTECTOMY      COLONOSCOPY  06/2011    Dr. Carlos Rene    COLONOSCOPY  11/15/2016    Dr Amaya Liz yr recall    COLONOSCOPY N/A 11/15/2016    COLONOSCOPY performed by Buddy León DO at [address] [facility]    COLONOSCOPY N/A 12/14/2021    Dr Jude Dasilva, Sub prep fair, (-)Micro Colitis, Mod to severe diverticulosis, Int hemorrhoids Gr 1 wo bleeding, 3 year recall    HYSTERECTOMY      KNEE ARTHROSCOPY Left     KNEE ARTHROSCOPY Right 01/27/2016    KNEE ARTHROSCOPY WITH SUBCHONDROPLASTY  performed by Anabell Mckeon MD at [address] [facility] Right 2017       Family History   Problem Relation Age of Onset    Other Mother         HEART FAILURE    Breast Cancer Mother 48    Heart Disease Father 79        bypass    Prostate Cancer Father 79    Colon Cancer Neg Hx     Colon Polyps Neg Hx     Esophageal Cancer Neg Hx     Liver Cancer Neg Hx     Liver Disease Neg Hx     Rectal Cancer Neg Hx     Stomach Cancer Neg Hx        Social History     Socioeconomic History    Marital status:    Tobacco Use    Smoking status: Never    Smokeless tobacco: Never   Vaping Use    Vaping Use: Never used   Substance and Sexual Activity    Alcohol use: Yes     Comment:  SOCIALLY Rare     Drug use: No    Sexual activity: Not Currently       Current Outpatient Medications   Medication Sig Dispense Refill    fluticasone (FLONASE) 50 MCG/ACT nasal spray 1 spray by Each Nostril route daily      Cholecalciferol (VITAMIN D) 50 MCG (2000 UT) CAPS capsule Take by mouth daily       Probiotic Product (PROBIOTIC-10 PO) Take by mouth daily as needed       conjugated estrogens (PREMARIN) 0.625 MG/GM vaginal cream Place vaginally as needed Place vaginally daily. Multiple Vitamins-Minerals (VISION FORMULA PO) Take by mouth daily as needed       Fexofenadine HCl (ALLEGRA PO) Take 180 mg by mouth daily as needed       amitriptyline (ELAVIL) 10 MG tablet Take 10 mg by mouth nightly. No current facility-administered medications for this visit. Allergies   Allergen Reactions    Felodipine      arrhythmias      Irbesartan Other (See Comments)    Atorvastatin Other (See Comments)     memory issues  Makes her feel \"awful\"      Atenolol      Other reaction(s): Dizziness    Codeine Nausea And Vomiting    Lisinopril Other (See Comments)       Review of Systems   Constitutional:  Negative for activity change, appetite change, fatigue, fever and unexpected weight change. HENT:  Positive for trouble swallowing. Respiratory:  Negative for cough, choking and shortness of breath. Cardiovascular:  Negative for chest pain. Gastrointestinal:  Positive for diarrhea. Negative for abdominal distention, abdominal pain, anal bleeding, blood in stool, constipation, nausea, rectal pain and vomiting. Reflux    Allergic/Immunologic: Negative for food allergies.    All other systems reviewed and are negative. Objective:     /72   Pulse 89   Ht 5' 2\" (1.575 m)   Wt 148 lb 6.4 oz (67.3 kg)   SpO2 98%   BMI 27.14 kg/m²     Physical Exam  Vitals reviewed. Constitutional:       General: She is not in acute distress. Appearance: She is well-developed. HENT:      Head: Normocephalic and atraumatic. Right Ear: External ear normal.      Left Ear: External ear normal.      Nose: Nose normal.      Comments: Mask on     Mouth/Throat:      Comments: Mask on  Eyes:      General: No scleral icterus. Right eye: No discharge. Left eye: No discharge. Conjunctiva/sclera: Conjunctivae normal.      Pupils: Pupils are equal, round, and reactive to light. Cardiovascular:      Rate and Rhythm: Normal rate and regular rhythm. Heart sounds: Normal heart sounds. No murmur heard. Pulmonary:      Effort: Pulmonary effort is normal. No respiratory distress. Breath sounds: Normal breath sounds. No wheezing or rales. Abdominal:      General: Bowel sounds are normal. There is no distension. Palpations: Abdomen is soft. There is no mass. Tenderness: no abdominal tenderness There is no guarding or rebound. Musculoskeletal:         General: Normal range of motion. Cervical back: Normal range of motion and neck supple. Skin:     General: Skin is warm and dry. Coloration: Skin is not pale. Neurological:      Mental Status: She is alert and oriented to person, place, and time.    Psychiatric:         Behavior: Behavior normal.

## 2022-09-07 NOTE — PATIENT INSTRUCTIONS
Patient Education        Gastroesophageal Reflux Disease (GERD): Care Instructions  Overview     Gastroesophageal reflux disease (GERD) is the backward flow of stomach acid into the esophagus. The esophagus is the tube that leads from your throat to your stomach. A one-way valve prevents the stomach acid from backing up into this tube. But when you have GERD, this valve does not close tightly enough. This can also cause pain and swelling in your esophagus. (This is calledesophagitis.)  If you have mild GERD symptoms including heartburn, you may be able to control the problem with antacids or over-the-counter medicine. You can also make lifestyle changes to help reduce your symptoms. These include changing yourdiet and eating habits, such as not eating late at night and losing weight. Follow-up care is a key part of your treatment and safety. Be sure to make and go to all appointments, and call your doctor if you are having problems. It's also a good idea to know your test results and keep alist of the medicines you take. How can you care for yourself at home? Take your medicines exactly as prescribed. Call your doctor if you think you are having a problem with your medicine. Your doctor may recommend over-the-counter medicine. For mild or occasional indigestion, antacids, such as Tums, Gaviscon, Mylanta, or Maalox, may help. Your doctor also may recommend over-the-counter acid reducers, such as famotidine (Pepcid AC), cimetidine (Tagamet HB), or omeprazole (Prilosec). Read and follow all instructions on the label. If you use these medicines often, talk with your doctor. Change your eating habits. It's best to eat several small meals instead of two or three large meals. After you eat, wait 2 to 3 hours before you lie down. Avoid foods that make your symptoms worse.  These may include chocolate, mint, alcohol, pepper, spicy foods, high-fat foods, or drinks with caffeine in them, such as tea, coffee, dagoberto, or

## 2022-10-04 ENCOUNTER — TELEPHONE (OUTPATIENT)
Dept: CARDIOLOGY CLINIC | Age: 72
End: 2022-10-04

## 2022-10-04 NOTE — TELEPHONE ENCOUNTER
Date: 10-31-22    Cardiologist: Dr. Wally Barnes    Procedure: laparoscopic paraesophageal hernia    Surgeon: Dr. Corrinne Piggs Office Visit: 4-7-22    Reason for office visit and medical concerns addressed at this office visit: HTN, hyperlipidemia    Testing Performed and Date of Service:  Janet 3-22-22  Echo 3-18-22    RCRI = 0 pts, low, 0.4%   METs 4    Current Medications: flonase, vit D, probiotic, estrogen, multiple vit, allegra, elavil     Is the patient currently taking an anticoagulant? If so, what is the diagnosis the patient has been given to warrant the need for the anticoagulant?  NA    Additional Notes: Cardiac Risk Request

## 2022-10-17 ENCOUNTER — OFFICE VISIT (OUTPATIENT)
Dept: CARDIOLOGY CLINIC | Age: 72
End: 2022-10-17
Payer: MEDICARE

## 2022-10-17 VITALS
BODY MASS INDEX: 27.23 KG/M2 | HEIGHT: 62 IN | SYSTOLIC BLOOD PRESSURE: 138 MMHG | DIASTOLIC BLOOD PRESSURE: 82 MMHG | WEIGHT: 148 LBS | HEART RATE: 88 BPM

## 2022-10-17 DIAGNOSIS — R00.2 PALPITATIONS: ICD-10-CM

## 2022-10-17 DIAGNOSIS — I10 ESSENTIAL HYPERTENSION: Primary | ICD-10-CM

## 2022-10-17 DIAGNOSIS — I51.7 LVH (LEFT VENTRICULAR HYPERTROPHY): ICD-10-CM

## 2022-10-17 PROCEDURE — 1123F ACP DISCUSS/DSCN MKR DOCD: CPT | Performed by: NURSE PRACTITIONER

## 2022-10-17 PROCEDURE — 99214 OFFICE O/P EST MOD 30 MIN: CPT | Performed by: NURSE PRACTITIONER

## 2022-10-17 RX ORDER — FAMOTIDINE 20 MG/1
20 TABLET, FILM COATED ORAL 2 TIMES DAILY PRN
COMMUNITY

## 2022-10-17 RX ORDER — VALSARTAN 40 MG/1
40 TABLET ORAL DAILY
Qty: 30 TABLET | Refills: 1 | Status: SHIPPED | OUTPATIENT
Start: 2022-10-17

## 2022-10-17 NOTE — PATIENT INSTRUCTIONS
New instructions for today:  Start valsartan 40 mg (1) tab daily. Monitor your blood pressure twice daily and keep a log. Your blood pressure goal is 130/80 or less. We will discuss in 2 weeks by either scheduled telephone encounter or patient call back. Office phone number [phone]. Patient Instructions:  Continue current medications as prescribed. Always keep a current medication list. Bring your medications to every office visit. Continue to follow up with primary care provider for non cardiac medical problems. Call the office with any problems, questions or concerns at [phone]. If you have been asked to keep a blood pressure log, do so for 2 weeks. Call the office to report readings to the triage nurse at [phone]. Follow up with cardiologist as scheduled. The following educational material has been included in this after visit summary for your review: Life simple 7. Heart health. Life simple 7  1) Manage blood pressure - high blood pressure is a major risk factor for heart disease and stroke. Keeping blood pressure in health range reduces strain on your heart, arteries and kidneys. Blood pressure goal is less than 130/80. 2) Control cholesterol - contributes to plaque, which can clog arteries and lead to heart disease and stroke. When you control your cholesterol you are giving your arteries their best chance to remain clear. It is recommended that you get cholesterol lab work done once a year. 3) Reduce blood sugar - most of the food we eat is turning into glucose or blood sugar that our body uses for energy. Over time, high levels of blood sugar can damage your heart, kidneys, eyes and nerves. 4) Get active - living an active life is one of the most rewarding gifts you can give yourself and those you love. Simply put, daily physical activity increases your length and quality of life. Strive to exercise 15 minutes most days of the week.   5)  Eat better - A healthy diet is one of your best weapons for fighting cardiovascular disease. When you eat a heart healthy diet, you improve your chances for feeling good and staying healthy for life. 6)  Lose weight - when you shed extra fat an unnecessary pounds, you reduce the burden on your hear, lungs, blood vessels and skeleton. You give yourself the gift of active living, you lower your blood pressure and help yourself feel better. 7) Stop smoking - cigarette smokers have a higher risk of developing cardiovascular disease. If  You smoke, quitting is the best thing you can do for your health. Check American Heart Association on line for more information on Life's Simple 7 and tips for healthy living. A Healthy Heart: Care Instructions  Your Care Instructions     Coronary artery disease, also called heart disease, occurs when a substance called plaque builds up in the vessels that supply oxygen-rich blood to your heart muscle. This can narrow the blood vessels and reduce blood flow. A heart attack happens when blood flow is completely blocked. A high-fat diet, smoking, and other factors increase the risk of heart disease. Your doctor has found that you have a chance of having heart disease. You can do lots of things to keep your heart healthy. It may not be easy, but you can change your diet, exercise more, and quit smoking. These steps really work to lower your chance of heart disease. Follow-up care is a key part of your treatment and safety. Be sure to make and go to all appointments, and call your doctor if you are having problems. It's also a good idea to know your test results and keep a list of the medicines you take. How can you care for yourself at home? Diet  Use less salt when you cook and eat. This helps lower your blood pressure. Taste food before salting. Add only a little salt when you think you need it. With time, your taste buds will adjust to less salt.   Eat fewer snack items, fast foods, canned soups, and other high-salt, high-fat, processed foods. Read food labels and try to avoid saturated and trans fats. They increase your risk of heart disease by raising cholesterol levels. Limit the amount of solid fat-butter, margarine, and shortening-you eat. Use olive, peanut, or canola oil when you cook. Bake, broil, and steam foods instead of frying them. Eat a variety of fruit and vegetables every day. Dark green, deep orange, red, or yellow fruits and vegetables are especially good for you. Examples include spinach, carrots, peaches, and berries. Foods high in fiber can reduce your cholesterol and provide important vitamins and minerals. High-fiber foods include whole-grain cereals and breads, oatmeal, beans, brown rice, citrus fruits, and apples. Eat lean proteins. Heart-healthy proteins include seafood, lean meats and poultry, eggs, beans, peas, nuts, seeds, and soy products. Limit drinks and foods with added sugar. These include candy, desserts, and soda pop. Lifestyle changes  If your doctor recommends it, get more exercise. Walking is a good choice. Bit by bit, increase the amount you walk every day. Try for at least 30 minutes on most days of the week. You also may want to swim, bike, or do other activities. Do not smoke. If you need help quitting, talk to your doctor about stop-smoking programs and medicines. These can increase your chances of quitting for good. Quitting smoking may be the most important step you can take to protect your heart. It is never too late to quit. Limit alcohol to 2 drinks a day for men and 1 drink a day for women. Too much alcohol can cause health problems. Manage other health problems such as diabetes, high blood pressure, and high cholesterol. If you think you may have a problem with alcohol or drug use, talk to your doctor. Medicines  Take your medicines exactly as prescribed. Call your doctor if you think you are having a problem with your medicine.   If your doctor recommends aspirin, take the amount directed each day. Make sure you take aspirin and not another kind of pain reliever, such as acetaminophen (Tylenol). When should you call for help? [id number] if you have symptoms of a heart attack. These may include:  Chest pain or pressure, or a strange feeling in the chest.  Sweating. Shortness of breath. Pain, pressure, or a strange feeling in the back, neck, jaw, or upper belly or in one or both shoulders or arms. Lightheadedness or sudden weakness. A fast or irregular heartbeat. After you call 911, the  may tell you to chew 1 adult-strength or 2 to 4 low-dose aspirin. Wait for an ambulance. Do not try to drive yourself. Watch closely for changes in your health, and be sure to contact your doctor if you have any problems. Where can you learn more? Go to https://Off[employer] VISION.Bizak. org and sign in to your [employer] account. Enter F712 in the [employer]ence box to learn more about \"A Healthy Heart: Care Instructions. \"     If you do not have an account, please click on the \"Sign Up Now\" link. Current as of: December 16, 2019               Content Version: 12.5  © 6655-2832 Healthwise, Incorporated. Care instructions adapted under license by [facility] ([facility]). If you have questions about a medical condition or this instruction, always ask your healthcare professional. [place] [address] any warranty or liability for your use of this information.

## 2022-10-17 NOTE — PROGRESS NOTES
[facility], [place]. [address] [facility]  [phone] office  [phone] fax      OFFICE VISIT:  10/17/2022    Beverly Linder Block - : 1950  Reason For Visit:  Keaton Posadas is a 73 y.o. female who is here for Follow-up (No cardiac symptoms), Hypertension, and Cardiac Valve Problem    History:   Diagnosis Orders   1. Essential hypertension        2. Palpitations        3. LVH (left ventricular hypertrophy)          The patient presents today for cardiology follow up. The patient presents today with BP issues. She presented for EGD with esophageal stricture dilatation and the procedure was stopped due to BP elevation over 819 systolic. Her PCP is wanted her to start BP medication and re schedule the procedure 2 weeks out. The patient reports BP runs 130-140 with home monitoring. She has a hx of anti hypertensive med side effects in the past.   The patient denies symptoms to suggest myocardial ischemia, heart failure or arrhythmia. BP today is 138/82. The patient's PCP monitors cholesterol. Felix Diallo denies exertional chest pain, shortness of breath, orthopnea, paroxysmal nocturnal dyspnea, syncope, presyncope, sensed arrhythmia, edema and fatigue. The patient denies numbness or weakness to suggest cerebrovascular accident or transient ischemic attack.       [place] has the following history as recorded in [facility]:  Patient Active Problem List   Diagnosis Code    History of colonic polyps Z86.010    Intermittent constipation K59.09    Valvular heart disease I38    Essential hypertension I10    LVH (left ventricular hypertrophy) I51.7    Chest tightness R07.89     Past Medical History:   Diagnosis Date    Arthritis     osteoarthritis    Colon polyps     FHx: migraine headaches     Heart disease     leaky valve    Hyperlipidemia     Joint pain     Lipids abnormal      Past Surgical History:   Procedure Laterality Date    CHOLECYSTECTOMY      COLONOSCOPY  06/2011    Dr. James Hughes    COLONOSCOPY  11/15/2016    Dr Sree Escalante yr recall    COLONOSCOPY N/A 11/15/2016    COLONOSCOPY performed by Maurice Norris DO at [facility]    COLONOSCOPY N/A 12/14/2021    Dr Pinon Legacy, Sub prep fair, (-)Micro Colitis, Mod to severe diverticulosis, Int hemorrhoids Gr 1 wo bleeding, 3 year recall    HYSTERECTOMY (CERVIX STATUS UNKNOWN)      KNEE ARTHROSCOPY Left     KNEE ARTHROSCOPY Right 01/27/2016    KNEE ARTHROSCOPY WITH SUBCHONDROPLASTY  performed by Marion Richards MD at [address] [place] [address] 2017     Family History   Problem Relation Age of Onset    Other Mother         HEART FAILURE    Breast Cancer Mother 48    Heart Disease Father 79        bypass    Prostate Cancer Father 79    Colon Cancer Neg Hx     Colon Polyps Neg Hx     Esophageal Cancer Neg Hx     Liver Cancer Neg Hx     Liver Disease Neg Hx     Rectal Cancer Neg Hx     Stomach Cancer Neg Hx      Social History     Tobacco Use    Smoking status: Never    Smokeless tobacco: Never   Substance Use Topics    Alcohol use: Yes     Comment:  SOCIALLY Rare       Current Outpatient Medications   Medication Sig Dispense Refill    famotidine (PEPCID) 20 MG tablet Take 20 mg by mouth 2 times daily as needed      fluticasone (FLONASE) 50 MCG/ACT nasal spray 1 spray by Each Nostril route daily      Cholecalciferol (VITAMIN D) 50 MCG (2000 UT) CAPS capsule Take by mouth daily       Probiotic Product (PROBIOTIC-10 PO) Take by mouth daily as needed       conjugated estrogens (PREMARIN) 0.625 MG/GM vaginal cream Place vaginally as needed Place vaginally daily. Multiple Vitamins-Minerals (VISION FORMULA PO) Take by mouth daily as needed       Fexofenadine HCl (ALLEGRA PO) Take 180 mg by mouth daily as needed       amitriptyline (ELAVIL) 10 MG tablet Take 10 mg by mouth nightly. No current facility-administered medications for this visit. Allergies: Felodipine, Irbesartan, Atorvastatin, Atenolol, Codeine, and Lisinopril    Review of Systems  Constitutional - no appetite change, or unexpected weight change. No fever, chills or diaphoresis. No significant change in activity level or new onset of fatigue. HEENT - no significant rhinorrhea or epistaxis. No tinnitus or significant hearing loss. Eyes - no sudden vision change or amaurosis. No corneal arcus, xantholasma, subconjunctival hemorrhage or discharge. Respiratory - no significant wheezing, stridor, apnea or cough. No dyspnea on exertion or shortness of air. Cardiovascular - no exertional chest pain to suggest myocardial ischemia. No orthopnea or PND. No sensation of sustained arrythmia. No occurrence of slow heart rate. No palpitations. No claudication. Gastrointestinal - no abdominal swelling or pain. No blood in stool. No severe constipation, diarrhea, nausea, or vomiting. Genitourinary - no dysuria, frequency, or urgency. No flank pain or hematuria. Musculoskeletal - no back pain or myalgia. No problems with gait. Extremities - no clubbing, cyanosis or extremity edema. Skin - no color change or rash. No pallor. No new surgical incision. Neurologic - no speech difficulty, facial asymmetry or lateralizing weakness. No seizures, presyncope or syncope. No significant dizziness. Hematologic - no easy bruising or excessive bleeding. Psychiatric - no severe anxiety or insomnia. No confusion. All other review of systems are negative. Objective  Vital Signs - /82   Pulse 88   Ht 5' 2\" (1.575 m)   Wt 148 lb (67.1 kg)   BMI 27.07 kg/m²   General - Luz is alert, cooperative, and pleasant. Well groomed. No acute distress. Body habitus - Body mass index is 27.07 kg/m². HEENT - Head is normocephalic. No circumoral cyanosis. Dentition is normal.  EYES -   Lids normal without ptosis. No discharge, edema or subconjunctival hemorrhage.    Neck - Symmetrical without apparent mass or lymphadenopathy. Respiratory - Normal respiratory effort without use of accessory muscles. Ausculatation reveals vesicular breath sounds without crackles, wheezes, rub or rhonchi. Cardiovascular - No jugular venous distention. Auscultation reveals regular rate and rhythm. No audible clicks, gallop or rub. No murmur. No lower extremity varicosities. No carotid bruits. Abdominal -  No visible distention, mass or pulsations. Extremities - No clubbing or cyanosis. No statis dermatitis or ulcers. No edema. Musculoskeletal -   No Osler's nodes. No kyphosis or scoliosis. Gait is even and regular without limp or shuffle. Ambulates without assistance. Skin -  Warm and dry; no rash or pallor. No new surgical wound. Neurological - No focal neurological deficits. Thought processes coherent. No apparent tremor. Oriented to person, place and time. Psychiatric -  Appropriate affect and mood. Data reviewed:  3/22/22 Lexiscan  Impression   Impression:   There is no obvious infarct or ischemia, with a calculated ejection   fraction of 83 %. Suggest: Clinical correlation with consideration for medical   management. Signed by Dr Errol Haro     3/18/22 echo  Normal left ventricular size with preserved LV function and an estimated  ejection fraction of approximately 55-60%. No definite regional wall  motion abnormalities. The average global longitudinal strain is normal  (-18.6%). Mild concentric left ventricular hypertrophy. Normal diastolic filling pattern for age. Normal right ventricular size with preserved RV function. Mildly sclerotic aortic valve. Mild aortic regurgitation. No stenosis is  noted. No evidence of significant pericardial effusion is noted. Aortic root and ascending aorta are within normal limits. The rhythm is sinus. Normal hemodynamic parameters; SVI 55 ml/m2, CI 3.13 L/min/m2, SVR 1,426  dynes/sec/cm-5.    Electronically signed by Bianca Urias(Interpreting physician)   on 03/18/2022 01:54 PM    Lab Results   Component Value Date    WBC 3.8 (L) 08/23/2016    HGB 14.6 08/23/2016    HCT 42.1 08/23/2016    MCV 90.9 08/23/2016     08/23/2016     Lab Results   Component Value Date     08/16/2016    K 4.1 08/16/2016     08/16/2016    CO2 25 08/16/2016    BUN 25 (H) 08/16/2016    CREATININE 0.9 08/16/2016    GLUCOSE 107 08/16/2016    CALCIUM 9.6 08/16/2016    PROT 7.1 08/16/2016    LABALBU 4.3 08/16/2016    BILITOT 0.3 08/16/2016    ALKPHOS 94 08/16/2016    AST 37 (H) 08/16/2016    ALT 47 (H) 08/16/2016    LABGLOM >60 08/16/2016    GLOB 2.8 08/16/2016       Lab Results   Component Value Date    CHOL 304 (H) 08/16/2016    CHOL 216 (H) 01/15/2016     Lab Results   Component Value Date    TRIG 83 (L) 08/16/2016    TRIG 87 (L) 01/15/2016     Lab Results   Component Value Date    HDL 92 08/16/2016    HDL 96 01/15/2016     Lab Results   Component Value Date    LDLCALC 195 08/16/2016     BP Readings from Last 3 Encounters:   10/17/22 138/82   09/07/22 138/72   03/23/22 132/88    Pulse Readings from Last 3 Encounters:   10/17/22 88   09/07/22 89   03/23/22 68        Wt Readings from Last 3 Encounters:   10/17/22 148 lb (67.1 kg)   09/07/22 148 lb 6.4 oz (67.3 kg)   03/23/22 147 lb (66.7 kg)       Assessment/Plan:   Diagnosis Orders   1. Essential hypertension        2. Palpitations        3. LVH (left ventricular hypertrophy)          Stable CV status without overt heart failure, sensed arrhythmia or angina. HTN - add Diovan 40 mg one tab daily. BP goal less than 130/80. Continue home BP log. Palpitations - currently no issues with arrhythmia. Patient is compliant with medication regimen. Previous cardiac history and records reviewed. Continue current medical management for cardiac related condition. Continue other current medications as directed.   Continue to follow up with primary care provider for non cardiac medical problems. [place] provider [place] [facility] please request that your labs be faxed to this office at [phone]. BP goal 130/80 or less. Call the office with any problems, questions or concerns at [phone]. Cardiology follow up as scheduled in [address] appointments. Educational included in patient instructions. Heart health.       Saurav Lopez, APRN

## 2022-11-08 RX ORDER — VALSARTAN 40 MG/1
TABLET ORAL
Qty: 30 TABLET | Refills: 1 | Status: SHIPPED | OUTPATIENT
Start: 2022-11-08

## 2022-11-11 ENCOUNTER — OFFICE VISIT (OUTPATIENT)
Dept: CARDIOLOGY CLINIC | Age: 72
End: 2022-11-11
Payer: MEDICARE

## 2022-11-11 VITALS
DIASTOLIC BLOOD PRESSURE: 70 MMHG | SYSTOLIC BLOOD PRESSURE: 128 MMHG | WEIGHT: 143 LBS | BODY MASS INDEX: 26.31 KG/M2 | HEART RATE: 97 BPM | HEIGHT: 62 IN

## 2022-11-11 DIAGNOSIS — I35.1 MILD AORTIC REGURGITATION: Primary | ICD-10-CM

## 2022-11-11 DIAGNOSIS — I10 ESSENTIAL HYPERTENSION: ICD-10-CM

## 2022-11-11 PROCEDURE — 99214 OFFICE O/P EST MOD 30 MIN: CPT | Performed by: INTERNAL MEDICINE

## 2022-11-11 PROCEDURE — 3074F SYST BP LT 130 MM HG: CPT | Performed by: INTERNAL MEDICINE

## 2022-11-11 PROCEDURE — 3078F DIAST BP <80 MM HG: CPT | Performed by: INTERNAL MEDICINE

## 2022-11-11 PROCEDURE — 1123F ACP DISCUSS/DSCN MKR DOCD: CPT | Performed by: INTERNAL MEDICINE

## 2022-11-11 ASSESSMENT — ENCOUNTER SYMPTOMS
SHORTNESS OF BREATH: 0
BACK PAIN: 0
BLOOD IN STOOL: 0
CONSTIPATION: 0
EYE DISCHARGE: 0
ABDOMINAL DISTENTION: 0
DIARRHEA: 0
COUGH: 0
WHEEZING: 0
VOMITING: 0

## 2022-11-11 NOTE — PROGRESS NOTES
[facility]  Cardiology Office Note  Naomi Morales  [id number]  Phone: [phone]  Fax: [phone]                            Date:  11/11/2022  Patient: Lois Erazo  Age:  73 y.o., 1950    Referral: No ref. provider found      PROBLEM LIST:    Patient Active Problem List    Diagnosis Date Noted    Valvular heart disease 04/22/2020     Priority: Low    Essential hypertension 04/22/2020     Priority: Low    LVH (left ventricular hypertrophy) 04/22/2020     Priority: Low    Chest tightness 04/22/2020     Priority: Low    History of colonic polyps 07/20/2016     Priority: Low     Overview Note:     Updating Deprecated Diagnoses      Intermittent constipation 07/20/2016     Priority: Low     1. Sclerotic aortic valve with mild aortic regurgitation with mild mitral regurgitation, hyperdynamic left ventricle with mild LVH, prior catheterization 10/29/2015 with patent coronary arteries with severe vessel tortuosity. .  2.  Hypertension likely stress related, multiple drug intolerance. PRESENTATION: Lois Erazo is a 73y.o. year old female presents for follow-up evaluation. Since her last visit she has been to the nurse practitioner multiple times. She has had an echocardiogram, she has had a stress test all of which were unremarkable. She has also been through many iterations of blood pressure medicine starting with atenolol which was switched to Plendil which was switched to Bystolic and now has settled on Diovan. Calcium channel blocker seem to increase her palpitations. Beta-blockers increase her Raynaud's, ACE inhibitor's increased coughing. She has been on Diovan for a few weeks and seems to be working by her account. She is on low-dose 40 mg twice daily. She did have a recent laparoscopic diaphragmatic repair and TIF procedure 10/31/2022. She is active and she exercises almost daily. No chest pain or shortness of breath.   No leg swelling. REVIEW OF SYSTEMS:  Review of Systems   Constitutional:  Negative for activity change, fatigue and fever. HENT:  Negative for ear pain, hearing loss and tinnitus. Eyes:  Negative for discharge and visual disturbance. Respiratory:  Negative for cough, shortness of breath and wheezing. Cardiovascular:  Negative for chest pain, palpitations and leg swelling. Gastrointestinal:  Negative for abdominal distention, blood in stool, constipation, diarrhea and vomiting. Endocrine: Negative for cold intolerance, heat intolerance, polydipsia and polyuria. Genitourinary:  Negative for dysuria and hematuria. Musculoskeletal:  Negative for arthralgias, back pain and myalgias. Skin:  Negative for pallor and rash. Neurological:  Negative for seizures, syncope, weakness and headaches. Psychiatric/Behavioral:  Negative for behavioral problems and dysphoric mood.       Past Medical History:      Diagnosis Date    Arthritis     osteoarthritis    Colon polyps     FHx: migraine headaches     Heart disease     leaky valve    Hyperlipidemia     Joint pain     Lipids abnormal        Past Surgical History:      Procedure Laterality Date    CHOLECYSTECTOMY      COLONOSCOPY  06/2011    Dr. Ammon Torres    COLONOSCOPY  11/15/2016    Dr Jordan Cardinal yr recall    COLONOSCOPY N/A 11/15/2016    COLONOSCOPY performed by Berto Cottrell DO at [facility] [place], [place] - [facility]    COLONOSCOPY N/A 12/14/2021    Dr Marla Ortiz, Sub prep fair, (-)Micro Colitis, Mod to severe diverticulosis, Int hemorrhoids Gr 1 wo bleeding, 3 year recall    DIAPHRAGMATIC HERNIA REPAIR      10/31/2022    HYSTERECTOMY (CERVIX STATUS UNKNOWN)      KNEE ARTHROSCOPY Left     KNEE ARTHROSCOPY Right 01/27/2016    KNEE ARTHROSCOPY WITH SUBCHONDROPLASTY  performed by Avis Spivey MD at [address] [place] [address] 83 Right 2017       Medications:  Current Outpatient Medications   Medication Sig Dispense Refill    valsartan (DIOVAN) 40 MG tablet TAKE 1 TABLET BY MOUTH EVERY DAY (Patient taking differently: Take 40 mg by mouth 2 times daily) 30 tablet 1    fluticasone (FLONASE) 50 MCG/ACT nasal spray 1 spray by Each Nostril route daily      Cholecalciferol (VITAMIN D) 50 MCG (2000 UT) CAPS capsule Take by mouth daily       Probiotic Product (PROBIOTIC-10 PO) Take by mouth daily as needed       conjugated estrogens (PREMARIN) 0.625 MG/GM vaginal cream Place vaginally as needed Place vaginally daily. Multiple Vitamins-Minerals (VISION FORMULA PO) Take by mouth daily as needed       Fexofenadine HCl (ALLEGRA PO) Take 180 mg by mouth daily as needed       amitriptyline (ELAVIL) 10 MG tablet Take 10 mg by mouth nightly. famotidine (PEPCID) 20 MG tablet Take 20 mg by mouth 2 times daily as needed (Patient not taking: Reported on 11/11/2022)       No current facility-administered medications for this visit.        Allergies:  Felodipine, Irbesartan, Atorvastatin, Atenolol, Codeine, and Lisinopril    Past Social History:  Social History     Socioeconomic History    Marital status:      Spouse name: Not on file    Number of children: Not on file    Years of education: Not on file    Highest education level: Not on file   Occupational History    Not on file   Tobacco Use    Smoking status: Never    Smokeless tobacco: Never   Vaping Use    Vaping Use: Never used   Substance and Sexual Activity    Alcohol use: Yes     Comment: Socially    Drug use: No    Sexual activity: Not Currently   Other Topics Concern    Not on file   Social History Narrative    Not on file     Social Determinants of Health     Financial Resource Strain: Not on file   Food Insecurity: Not on file   Transportation Needs: Not on file   Physical Activity: Not on file   Stress: Not on file   Social Connections: Not on file   Intimate Partner Violence: Not on file   Housing Stability: Not on file       Family History:       Problem Relation Age of Onset    Other Mother         [place] Breast Cancer Mother 48    Heart Disease Father 79        bypass    Prostate Cancer Father 79    Colon Cancer Neg Hx     Colon Polyps Neg Hx     Esophageal Cancer Neg Hx     Liver Cancer Neg Hx     Liver Disease Neg Hx     Rectal Cancer Neg Hx     Stomach Cancer Neg Hx          Physical Examination:  /70   Pulse 97   Ht 5' 2\" (1.575 m)   Wt 143 lb (64.9 kg)   BMI 26.16 kg/m²   Physical Exam  Constitutional:       General: She is not in acute distress. Appearance: She is not diaphoretic. Comments: Normal build  Blood pressure right arm sitting 120/70 mmHg, pulse 72 bpm regular   HENT:      Mouth/Throat:      Pharynx: No oropharyngeal exudate. Eyes:      General: No scleral icterus. Right eye: No discharge. Left eye: No discharge. Neck:      Thyroid: No thyromegaly. Vascular: No JVD. Cardiovascular:      Rate and Rhythm: Normal rate and regular rhythm. No extrasystoles are present. Heart sounds: Normal heart sounds, S1 normal and S2 normal. No murmur heard. No systolic murmur is present. No diastolic murmur is present. No friction rub. No gallop. No S3 or S4 sounds. Comments: No JVD  No edema  Short systolic murmur in the aortic area with A2 well heard  Pulmonary:      Effort: Pulmonary effort is normal. No respiratory distress. Breath sounds: Normal breath sounds. No wheezing or rales. Chest:      Chest wall: No tenderness. Abdominal:      General: Bowel sounds are normal. There is no distension. Palpations: Abdomen is soft. There is no mass. Tenderness: There is no abdominal tenderness. There is no guarding or rebound. Hernia: No hernia is present. Comments: No palpable organomegaly   Musculoskeletal:         General: Normal range of motion. Skin:     General: Skin is warm. Coloration: Skin is not pale. Findings: No rash. Neurological:      Mental Status: She is alert and oriented to person, place, and time. Cranial Nerves: No cranial nerve deficit. Deep Tendon Reflexes: Reflexes normal.         Labs:   CBC: No results for input(s): WBC, HGB, HCT, PLT in the last 72 hours. BMP:No results for input(s): NA, K, CO2, BUN, CREATININE, LABGLOM, GLUCOSE in the last 72 hours. BNP: No results for input(s): BNP in the last 72 hours. PT/INR: No results for input(s): PROTIME, INR in the last 72 hours. APTT:No results for input(s): APTT in the last 72 hours. CARDIAC ENZYMES:No results for input(s): CKTOTAL, CKMB, CKMBINDEX, TROPONINI in the last 72 hours. FASTING LIPID PANEL:  Lab Results   Component Value Date/Time    HDL 92 08/16/2016 08:08 AM    LDLDIRECT 112 01/15/2016 07:58 AM    LDLCALC 195 08/16/2016 08:08 AM    TRIG 83 08/16/2016 08:08 AM     LIVER PROFILE:No results for input(s): AST, ALT, LABALBU in the last 72 hours. Imaging:    Treadmill  Nuclear Stress Test Report   Procedure date: 3/22/2022   Indications: chest pain   Procedure: Stress was performed using a standard Eriberto protocol. Vital   signs and EKG were monitored. Technetium-99 Myoview was injected in   divided doses, 7.5 mCi and 23.8 mCi respectively for rest and stress   imaging. The patient was discharged in stable condition. Results: Patient had  dyspnea during exercise that resolved in   recovery. Baseline EKG showed sinus rhythm with right bundle branch   block. During stress there was  nonspecific ST changes. Changes   resolved in recovery. Baseline and peak blood pressures were 152/77,   and 216/66 respectively. Baseline and peak heart rates were 72 and    133 respectively. The patient exercised for 6 minutes 0 seconds on a   standard Eriberto protocol obtaining target heart rate. EF= 83%   Lexiscan/Cardiolyte Nuclear Medicine Report   Date of Procedure: 20/2/2022   The patient was injected with 7.5 millicuries (mCi) of Technetium   (Tc99m).   After an appropriate level of stress the patient was   re-injected with 11.6 the millicuries (mCi) of Technetium (Tc99m). Repeat gated images were then performed per standard protocol. Findings:   1. Analysis of the the stress and rest images reveals no obvious   defects on stress and rest images. 2.  Analysis of the gated images reveals grossly normal left   ventricular function with a calculated ejection fraction of 83 %. Impression   Impression:   There is no obvious infarct or ischemia, with a calculated ejection   fraction of 83 %. Suggest: Clinical correlation with consideration for medical   management. Signed by Dr Hetal Myrick        Type of Study      TTE procedure:ECHO NO CONTRAST WITH DOP/COLR. Study Location: Echo Lab  Technical Quality: Good visualization     Patient Status: Outpatient     HR: 57 bpm BP: 133/80 mmHg     Indications:Hypertension. Conclusions      Summary   Normal left ventricular size with preserved LV function and an estimated   ejection fraction of approximately 55-60%. No definite regional wall   motion abnormalities. The average global longitudinal strain is normal   (-18.6%). Mild concentric left ventricular hypertrophy. Normal diastolic filling pattern for age. Normal right ventricular size with preserved RV function. Mildly sclerotic aortic valve. Mild aortic regurgitation. No stenosis is   noted. No evidence of significant pericardial effusion is noted. Aortic root and ascending aorta are within normal limits. The rhythm is sinus. Normal hemodynamic parameters; SVI 55 ml/m2, CI 3.13 L/min/m2, SVR 1,426   dynes/sec/cm-5.       Signature      ----------------------------------------------------------------   Electronically signed by Jewel Urias(Interpreting physician)   on 03/18/2022 01:54 PM   ----------------------------------------------------------------    ASSESSMENT and PLAN:    62-year-old female patient with past medical history of sclerotic aortic valve with mild aortic regurgitation, mild mitral regurgitation, hyperdynamic left ventricle with mild LVH, stress related hypertension here for follow-up evaluation. 1.  I have again reassured her as regards her valve disease. This can be followed periodically clinically. She does not require an echocardiogram to be done every 2 years as a routine. Would avoid excessive testing unless new issues should arise. A large part of her hypertension is related to anxiety. Currently she is tolerating Diovan and hopefully this will continue to manage her blood pressure. 2.  Continue current activity levels. She is undertaking meditation type exercises on a daily basis. 3.  Can follow-up with nurse practitioner in 6 months and with me in 1 year. Orders:  No orders of the defined types were placed in this encounter. No orders of the defined types were placed in this encounter. Return for NP 6 mths; me 1 year. Electronically signed by John Dugan MD on 11/11/2022 at 9:52 AM    [facility]      Thisdictation was generated by voice recognition computer software. Although all attempts are made to edit the dictation for accuracy, there may be errors in the transcription that are not intended.

## 2022-12-19 ENCOUNTER — TELEPHONE (OUTPATIENT)
Dept: CARDIOLOGY CLINIC | Age: 72
End: 2022-12-19

## 2022-12-19 NOTE — TELEPHONE ENCOUNTER
Called and left VM with patient to reschedule 11/10 with [facility] to later time, BW 12/19

## 2023-01-05 ENCOUNTER — TELEPHONE (OUTPATIENT)
Dept: CARDIOLOGY CLINIC | Age: 73
End: 2023-01-05

## 2023-01-05 NOTE — TELEPHONE ENCOUNTER
Called and left VM with patient to reschedule 11/10 with Trey Gutierrez to later time, BW 12/19, 12/20, 1/5 Cancelled due to 3x attempts to reschedule with no response

## 2023-02-15 RX ORDER — VALSARTAN 40 MG/1
40 TABLET ORAL 2 TIMES DAILY
Qty: 60 TABLET | Refills: 5 | Status: SHIPPED | OUTPATIENT
Start: 2023-02-15

## 2023-03-02 ENCOUNTER — SCHEDULED TELEPHONE ENCOUNTER (OUTPATIENT)
Dept: CARDIOLOGY CLINIC | Age: 73
End: 2023-03-02
Payer: MEDICARE

## 2023-03-02 DIAGNOSIS — I51.89 GRADE I DIASTOLIC DYSFUNCTION: ICD-10-CM

## 2023-03-02 DIAGNOSIS — R00.2 PALPITATIONS: ICD-10-CM

## 2023-03-02 DIAGNOSIS — I10 ESSENTIAL HYPERTENSION: Primary | ICD-10-CM

## 2023-03-02 DIAGNOSIS — E78.2 MIXED HYPERLIPIDEMIA: ICD-10-CM

## 2023-03-02 DIAGNOSIS — Z82.49 FAMILY HISTORY OF CORONARY ARTERY DISEASE IN FATHER: ICD-10-CM

## 2023-03-02 PROCEDURE — 99441 PR PHYS/QHP TELEPHONE EVALUATION 5-10 MIN: CPT | Performed by: NURSE PRACTITIONER

## 2023-03-02 RX ORDER — VALSARTAN 80 MG/1
TABLET ORAL
COMMUNITY
Start: 2023-01-21

## 2023-03-02 NOTE — PATIENT INSTRUCTIONS
New instructions for today:  You will be notified about scheduling cardiac calcium score. Patient Instructions:  Continue current medications as prescribed. Always keep a current medication list. Bring your medications to every office visit. Continue to follow up with primary care provider for non cardiac medical problems. Call the office with any problems, questions or concerns at [phone]. If you have been asked to keep a blood pressure log, do so for 2 weeks. Call the office to report readings to the triage nurse at [phone]. Follow up with cardiologist as scheduled. The following educational material has been included in this after visit summary for your review: Life simple 7. Heart health. Life simple 7  1) Manage blood pressure - high blood pressure is a major risk factor for heart disease and stroke. Keeping blood pressure in health range reduces strain on your heart, arteries and kidneys. Blood pressure goal is less than 130/80. 2) Control cholesterol - contributes to plaque, which can clog arteries and lead to heart disease and stroke. When you control your cholesterol you are giving your arteries their best chance to remain clear. It is recommended that you get cholesterol lab work done once a year. 3) Reduce blood sugar - most of the food we eat is turning into glucose or blood sugar that our body uses for energy. Over time, high levels of blood sugar can damage your heart, kidneys, eyes and nerves. 4) Get active - living an active life is one of the most rewarding gifts you can give yourself and those you love. Simply put, daily physical activity increases your length and quality of life. Strive to exercise 15 minutes most days of the week. 5)  Eat better - A healthy diet is one of your best weapons for fighting cardiovascular disease. When you eat a heart healthy diet, you improve your chances for feeling good and staying healthy for life.   6)  Lose weight - when you shed extra fat an unnecessary pounds, you reduce the burden on your hear, lungs, blood vessels and skeleton. You give yourself the gift of active living, you lower your blood pressure and help yourself feel better. 7) Stop smoking - cigarette smokers have a higher risk of developing cardiovascular disease. If  You smoke, quitting is the best thing you can do for your health. Check American Heart Association on line for more information on Life's Simple 7 and tips for healthy living. A Healthy Heart: Care Instructions  Your Care Instructions     Coronary artery disease, also called heart disease, occurs when a substance called plaque builds up in the vessels that supply oxygen-rich blood to your heart muscle. This can narrow the blood vessels and reduce blood flow. A heart attack happens when blood flow is completely blocked. A high-fat diet, smoking, and other factors increase the risk of heart disease. Your doctor has found that you have a chance of having heart disease. You can do lots of things to keep your heart healthy. It may not be easy, but you can change your diet, exercise more, and quit smoking. These steps really work to lower your chance of heart disease. Follow-up care is a key part of your treatment and safety. Be sure to make and go to all appointments, and call your doctor if you are having problems. It's also a good idea to know your test results and keep a list of the medicines you take. How can you care for yourself at home? Diet  Use less salt when you cook and eat. This helps lower your blood pressure. Taste food before salting. Add only a little salt when you think you need it. With time, your taste buds will adjust to less salt. Eat fewer snack items, fast foods, canned soups, and other high-salt, high-fat, processed foods. Read food labels and try to avoid saturated and trans fats. They increase your risk of heart disease by raising cholesterol levels.   Limit the amount of solid fat-butter, margarine, and shortening-you eat. Use olive, peanut, or canola oil when you cook. Bake, broil, and steam foods instead of frying them. Eat a variety of fruit and vegetables every day. Dark green, deep orange, red, or yellow fruits and vegetables are especially good for you. Examples include spinach, carrots, peaches, and berries. Foods high in fiber can reduce your cholesterol and provide important vitamins and minerals. High-fiber foods include whole-grain cereals and breads, oatmeal, beans, brown rice, citrus fruits, and apples. Eat lean proteins. Heart-healthy proteins include seafood, lean meats and poultry, eggs, beans, peas, nuts, seeds, and soy products. Limit drinks and foods with added sugar. These include candy, desserts, and soda pop. Lifestyle changes  If your doctor recommends it, get more exercise. Walking is a good choice. Bit by bit, increase the amount you walk every day. Try for at least 30 minutes on most days of the week. You also may want to swim, bike, or do other activities. Do not smoke. If you need help quitting, talk to your doctor about stop-smoking programs and medicines. These can increase your chances of quitting for good. Quitting smoking may be the most important step you can take to protect your heart. It is never too late to quit. Limit alcohol to 2 drinks a day for men and 1 drink a day for women. Too much alcohol can cause health problems. Manage other health problems such as diabetes, high blood pressure, and high cholesterol. If you think you may have a problem with alcohol or drug use, talk to your doctor. Medicines  Take your medicines exactly as prescribed. Call your doctor if you think you are having a problem with your medicine. If your doctor recommends aspirin, take the amount directed each day. Make sure you take aspirin and not another kind of pain reliever, such as acetaminophen (Tylenol). When should you call for help?    [id number] if you have symptoms of a heart attack. These may include:  Chest pain or pressure, or a strange feeling in the chest.  Sweating. Shortness of breath. Pain, pressure, or a strange feeling in the back, neck, jaw, or upper belly or in one or both shoulders or arms. Lightheadedness or sudden weakness. A fast or irregular heartbeat. After you call 911, the  may tell you to chew 1 adult-strength or 2 to 4 low-dose aspirin. Wait for an ambulance. Do not try to drive yourself. Watch closely for changes in your health, and be sure to contact your doctor if you have any problems. Where can you learn more? Go to https://[employer].igobubble. org and sign in to your [employer] account. Enter B040 in the [employer]ocia box to learn more about \"A Healthy Heart: Care Instructions. \"     If you do not have an account, please click on the \"Sign Up Now\" link. Current as of: December 16, 2019               Content Version: 12.5  © 9084-8074 [employer]. Care instructions adapted under license by [facility] ([facility]). If you have questions about a medical condition or this instruction, always ask your healthcare professional. [place] [address] any warranty or liability for your use of this information.

## 2023-03-02 NOTE — PROGRESS NOTES
Lucille Rivera is a 81 y.o. female evaluated via telephone on 3/2/2023. Consent:  She and/or health care decision maker is aware that that she may receive a bill for this telephone service, depending on her insurance coverage, and has provided verbal consent to proceed: Yes    Documentation:  I communicated with the patient and/or health care decision maker about HTN. Details of this discussion including any medical advice provided: heart block. I affirm this is a Patient Initiated Episode with an Established Patient who has not had a related appointment within my department in the past 7 days or scheduled within the next 24 hours. Total Time: minutes: 5-10 minutes    Note: not billable if this call serves to triage the patient into an appointment for the relevant concern    ASHLEY Solis      3/2/2023    Audio Patient Encouter([facility])  The telephone encourter was conducted with patient in their residence from [address] with ASHLEY Peterson; assistance by Ester Tinoco MA.    HPI:  Luz Humphries   Diagnosis Orders   1. Essential hypertension  CT CARDIAC CALCIUM SCORING      2. Grade I diastolic dysfunction        3. Palpitations        4. Mixed hyperlipidemia  CT CARDIAC CALCIUM SCORING      5. Family history of coronary artery disease in father          The patient presents today for phone visit regarding BP management and cholesterol. Her BP has been doing very well running 079'H systolic on Diovan 40 mg AM and 80 mg PM.    She is concerned about recent lipid profile showing HDL 89,  and TRIG 80. Her father had CAD and mother VHD. The patient is concerned about taking a statin due to hx of medication intolerance. She is inquiring about a CT cardiac calcium score. The patient denies symptoms to suggest myocardial ischemia, heart failure or arrhythmia.         SUBJECTIVE:  Corrine Gallardo denies exertional chest pain, shortness of breath, orthopnea, paroxysmal nocturnal dyspnea, syncope, presyncope, sensed arrhythmia, edema and fatigue. The patient denies numbness or weakness to suggest cerebrovascular accident or transient ischemic attack. Review of Systems    Prior to Visit Medications    Medication Sig Taking? Authorizing Provider   valsartan (DIOVAN) 80 MG tablet TAKE 1 TABLET BY MOUTH EVERYDAY AT BEDTIME Yes Historical Provider, MD   valsartan (DIOVAN) 40 MG tablet Take 1 tablet by mouth 2 times daily  Patient taking differently: Take 40 mg by mouth daily Yes ASHLEY Cabral   fluticasone (FLONASE) 50 MCG/ACT nasal spray 1 spray by Each Nostril route daily Yes Historical Provider, MD   Cholecalciferol (VITAMIN D) 50 MCG (2000 UT) CAPS capsule Take by mouth daily  Yes Historical Provider, MD   Probiotic Product (PROBIOTIC-10 PO) Take by mouth daily as needed  Yes Historical Provider, MD   conjugated estrogens (PREMARIN) 0.625 MG/GM vaginal cream Place vaginally as needed Place vaginally daily. Yes Historical Provider, MD   Multiple Vitamins-Minerals (VISION FORMULA PO) Take by mouth daily as needed  Yes Historical Provider, MD   Fexofenadine HCl (ALLEGRA PO) Take 180 mg by mouth daily as needed  Yes Historical Provider, MD   amitriptyline (ELAVIL) 10 MG tablet Take 10 mg by mouth nightly. Yes Historical Provider, MD       Social History     Tobacco Use    Smoking status: Never    Smokeless tobacco: Never   Vaping Use    Vaping Use: Never used   Substance Use Topics    Alcohol use: Yes     Comment: Socially    Drug use: No        REVIEW OF SYSTEMS:  Constitutional - no appetite change, or unexpected weight change. No fever, chills or diaphoresis. No significant change in activity level or new onset of fatigue. HEENT - no significant rhinorrhea or epistaxis. No tinnitus or significant hearing loss. Eyes - no sudden vision change or amaurosis. No corneal arcus, xantholasma, subconjunctival hemorrhage or discharge.   Respiratory - no significant wheezing, stridor, apnea or cough.  No dyspnea on exertion or shortness of air.  Cardiovascular - no exertional chest pain to suggest myocardial ischemia.  No orthopnea or PND.  No sensation of sustained arrythmia.  No occurrence of slow heart rate.  No palpitations.  No claudication.    Gastrointestinal - no abdominal swelling or pain. No blood in stool. No severe constipation, diarrhea, nausea, or vomiting.   Genitourinary - no dysuria, frequency, or urgency. No flank pain or hematuria.   Musculoskeletal - no back pain or myalgia.  No problems with gait.  Extremities - no clubbing, cyanosis or extremity edema.  Skin - no color change or rash.  No pallor.  No new surgical incision.   Neurologic - no speech difficulty, facial asymmetry or lateralizing weakness.  No seizures, presyncope or syncope.  No significant dizziness.  Hematologic - no easy bruising or excessive bleeding.   Psychiatric - no severe anxiety or insomnia.  No confusion.   All other review of systems are negative.    DATA REVIEWED:  3/22/22 Lexiscan  Impression:   There is no obvious infarct or ischemia, with a calculated ejection   fraction of 83 %.   Suggest: Clinical correlation with consideration for medical   management.   Signed by Dr Karthik Gaona     3/7/22 echo   Normal left ventricular size with preserved LV function and an estimated  ejection fraction of approximately 55-60%. No definite regional wall  motion abnormalities. The average global longitudinal strain is normal  (-18.6%). Mild concentric left ventricular hypertrophy.   Normal diastolic filling pattern for age.   Normal right ventricular size with preserved RV function.   Mildly sclerotic aortic valve. Mild aortic regurgitation. No stenosis is   noted.   No evidence of significant pericardial effusion is noted.   Aortic root and ascending aorta are within normal limits.   The rhythm is sinus.   Normal hemodynamic parameters; SVI 55 ml/m2, CI 3.13 L/min/m2, SVR 1,426   dynes/sec/cm-5. Electronically signed by Hermelinda Urias(Interpreting physician)   on 03/18/2022 01:54 PM    ASSESSMENT/PlAN:   Diagnosis Orders   1. Essential hypertension  CT CARDIAC CALCIUM SCORING      2. Grade I diastolic dysfunction        3. Palpitations        4. Mixed hyperlipidemia  CT CARDIAC CALCIUM SCORING      5. Family history of coronary artery disease in father          Stable cardiac status with no overt heart failure, angina or sensed arrhythmia. HTN - well controlled on current regimen. Tolerating Diovan well. BP running 816'A sytolic. Palpitations - no current issue with arrhythmia. Hyperlipidemia with family hx CAD - schedule CT cardiac calcium score. Discussed statin therapy. Patient will defer until test results are back. Discussed other options such as Zetia, Welchol or Repatha. Patient compliant with medication regimen. Continue current medical management for cardiac condition. Continue current medications as prescribed. BP goal is 130/80 or less. If your primary care provider is outside of the [facility], please request your labs be faxed to this office at [phone]. Continue to follow up with primary care provider for non cardiac medical problems. Call the office with any problems, questions or concerns at [phone]. Follow up with cardiologist as scheduled in [address] The following educational material has been included in this after visit summary for patient review:  Heart healthWolfgang Wilcox is a 81 y.o. female being evaluated by a telephone encounter to address concerns as mentioned above. A caregiver was present when appropriate. Due to this being a TeleHealth encounter (During [facility]-49 public health emergency).   Pursuant to the emergency declaration under the [address], [address] [place] [address] authority and the [employer] General Act, this Virtual Visit was conducted with patient's (and/or legal guardian's) consent, to reduce the patient's risk of exposure to COVID-19 and provide necessary medical care. The patient (and/or legal guardian) has also been advised to contact this office for worsening conditions or problems, and seek emergency medical treatment and/or call 911 if deemed necessary. Services were provided through a telephone discussion virtually to substitute for in-person clinic visit. Patient and provider were located at their individual homes. --ASHLEY Baltazar on 3/2/2023 at 9:26 AM    An electronic signature was used to authenticate this note.

## 2023-03-06 ENCOUNTER — PRE-ADMISSION TESTING (OUTPATIENT)
Dept: PREADMISSION TESTING | Facility: HOSPITAL | Age: 73
End: 2023-03-06
Payer: MEDICARE

## 2023-03-06 ENCOUNTER — TELEPHONE (OUTPATIENT)
Dept: CARDIOLOGY CLINIC | Age: 73
End: 2023-03-06

## 2023-03-06 VITALS
BODY MASS INDEX: 25.96 KG/M2 | DIASTOLIC BLOOD PRESSURE: 91 MMHG | HEIGHT: 62 IN | SYSTOLIC BLOOD PRESSURE: 166 MMHG | WEIGHT: 141.09 LBS | HEART RATE: 96 BPM | OXYGEN SATURATION: 98 % | RESPIRATION RATE: 18 BRPM

## 2023-03-06 LAB
ANION GAP SERPL CALCULATED.3IONS-SCNC: 10 MMOL/L (ref 5–15)
BUN SERPL-MCNC: 22 MG/DL (ref 8–23)
BUN/CREAT SERPL: 25 (ref 7–25)
CALCIUM SPEC-SCNC: 9.3 MG/DL (ref 8.6–10.5)
CHLORIDE SERPL-SCNC: 101 MMOL/L (ref 98–107)
CO2 SERPL-SCNC: 25 MMOL/L (ref 22–29)
CREAT SERPL-MCNC: 0.88 MG/DL (ref 0.57–1)
DEPRECATED RDW RBC AUTO: 40 FL (ref 37–54)
EGFRCR SERPLBLD CKD-EPI 2021: 70.8 ML/MIN/1.73
ERYTHROCYTE [DISTWIDTH] IN BLOOD BY AUTOMATED COUNT: 12.1 % (ref 12.3–15.4)
GLUCOSE SERPL-MCNC: 124 MG/DL (ref 65–99)
HCT VFR BLD AUTO: 39.3 % (ref 34–46.6)
HGB BLD-MCNC: 13.5 G/DL (ref 12–15.9)
MCH RBC QN AUTO: 31 PG (ref 26.6–33)
MCHC RBC AUTO-ENTMCNC: 34.4 G/DL (ref 31.5–35.7)
MCV RBC AUTO: 90.1 FL (ref 79–97)
PLATELET # BLD AUTO: 190 10*3/MM3 (ref 140–450)
PMV BLD AUTO: 12.8 FL (ref 6–12)
POTASSIUM SERPL-SCNC: 3.9 MMOL/L (ref 3.5–5.2)
RBC # BLD AUTO: 4.36 10*6/MM3 (ref 3.77–5.28)
SODIUM SERPL-SCNC: 136 MMOL/L (ref 136–145)
WBC NRBC COR # BLD: 6.81 10*3/MM3 (ref 3.4–10.8)

## 2023-03-06 PROCEDURE — 80048 BASIC METABOLIC PNL TOTAL CA: CPT

## 2023-03-06 PROCEDURE — 85027 COMPLETE CBC AUTOMATED: CPT

## 2023-03-06 PROCEDURE — 36415 COLL VENOUS BLD VENIPUNCTURE: CPT

## 2023-03-06 PROCEDURE — 93005 ELECTROCARDIOGRAM TRACING: CPT

## 2023-03-06 PROCEDURE — 93010 ELECTROCARDIOGRAM REPORT: CPT | Performed by: INTERNAL MEDICINE

## 2023-03-06 RX ORDER — VALSARTAN 80 MG/1
80 TABLET ORAL NIGHTLY
COMMUNITY

## 2023-03-06 RX ORDER — VALSARTAN 40 MG/1
40 TABLET ORAL DAILY
COMMUNITY

## 2023-03-06 NOTE — TELEPHONE ENCOUNTER
The pt requested the CT orders sent to [facility].  The pt stated that she was told [facility]'s CT machine is down.

## 2023-03-08 ENCOUNTER — ANESTHESIA (OUTPATIENT)
Dept: PERIOP | Facility: HOSPITAL | Age: 73
End: 2023-03-08
Payer: MEDICARE

## 2023-03-08 ENCOUNTER — HOSPITAL ENCOUNTER (OUTPATIENT)
Facility: HOSPITAL | Age: 73
Setting detail: HOSPITAL OUTPATIENT SURGERY
Discharge: HOME | End: 2023-03-08
Attending: ORTHOPAEDIC SURGERY | Admitting: ORTHOPAEDIC SURGERY
Payer: MEDICARE

## 2023-03-08 ENCOUNTER — ANESTHESIA EVENT (OUTPATIENT)
Dept: PERIOP | Facility: HOSPITAL | Age: 73
End: 2023-03-08
Payer: MEDICARE

## 2023-03-08 VITALS
SYSTOLIC BLOOD PRESSURE: 119 MMHG | OXYGEN SATURATION: 99 % | HEART RATE: 64 BPM | DIASTOLIC BLOOD PRESSURE: 66 MMHG | TEMPERATURE: 97.5 F | RESPIRATION RATE: 16 BRPM

## 2023-03-08 PROBLEM — M65.341 TRIGGER RING FINGER OF RIGHT HAND: Status: ACTIVE | Noted: 2023-03-08

## 2023-03-08 LAB
QT INTERVAL: 426 MS
QTC INTERVAL: 469 MS

## 2023-03-08 PROCEDURE — 25010000002 CEFAZOLIN PER 500 MG: Performed by: ORTHOPAEDIC SURGERY

## 2023-03-08 PROCEDURE — 25010000002 PROPOFOL 1000 MG/100ML EMULSION: Performed by: NURSE ANESTHETIST, CERTIFIED REGISTERED

## 2023-03-08 PROCEDURE — 0 LIDOCAINE 1 % SOLUTION: Performed by: ORTHOPAEDIC SURGERY

## 2023-03-08 RX ORDER — MIDAZOLAM HYDROCHLORIDE 1 MG/ML
0.5 INJECTION INTRAMUSCULAR; INTRAVENOUS
Status: DISCONTINUED | OUTPATIENT
Start: 2023-03-08 | End: 2023-03-08 | Stop reason: HOSPADM

## 2023-03-08 RX ORDER — OXYCODONE AND ACETAMINOPHEN 7.5; 325 MG/1; MG/1
2 TABLET ORAL EVERY 4 HOURS PRN
Status: DISCONTINUED | OUTPATIENT
Start: 2023-03-08 | End: 2023-03-08 | Stop reason: HOSPADM

## 2023-03-08 RX ORDER — IBUPROFEN 600 MG/1
600 TABLET ORAL ONCE AS NEEDED
Status: DISCONTINUED | OUTPATIENT
Start: 2023-03-08 | End: 2023-03-08 | Stop reason: HOSPADM

## 2023-03-08 RX ORDER — SODIUM CHLORIDE, SODIUM LACTATE, POTASSIUM CHLORIDE, CALCIUM CHLORIDE 600; 310; 30; 20 MG/100ML; MG/100ML; MG/100ML; MG/100ML
100 INJECTION, SOLUTION INTRAVENOUS CONTINUOUS PRN
Status: DISCONTINUED | OUTPATIENT
Start: 2023-03-08 | End: 2023-03-08 | Stop reason: HOSPADM

## 2023-03-08 RX ORDER — DROPERIDOL 2.5 MG/ML
0.62 INJECTION, SOLUTION INTRAMUSCULAR; INTRAVENOUS ONCE AS NEEDED
Status: DISCONTINUED | OUTPATIENT
Start: 2023-03-08 | End: 2023-03-08 | Stop reason: HOSPADM

## 2023-03-08 RX ORDER — ONDANSETRON 2 MG/ML
4 INJECTION INTRAMUSCULAR; INTRAVENOUS ONCE AS NEEDED
Status: DISCONTINUED | OUTPATIENT
Start: 2023-03-08 | End: 2023-03-08 | Stop reason: HOSPADM

## 2023-03-08 RX ORDER — SODIUM CHLORIDE 0.9 % (FLUSH) 0.9 %
10 SYRINGE (ML) INJECTION AS NEEDED
Status: DISCONTINUED | OUTPATIENT
Start: 2023-03-08 | End: 2023-03-08 | Stop reason: HOSPADM

## 2023-03-08 RX ORDER — LABETALOL HYDROCHLORIDE 5 MG/ML
5 INJECTION, SOLUTION INTRAVENOUS
Status: DISCONTINUED | OUTPATIENT
Start: 2023-03-08 | End: 2023-03-08 | Stop reason: HOSPADM

## 2023-03-08 RX ORDER — OXYCODONE AND ACETAMINOPHEN 10; 325 MG/1; MG/1
1 TABLET ORAL ONCE AS NEEDED
Status: DISCONTINUED | OUTPATIENT
Start: 2023-03-08 | End: 2023-03-08 | Stop reason: HOSPADM

## 2023-03-08 RX ORDER — SODIUM CHLORIDE 9 MG/ML
40 INJECTION, SOLUTION INTRAVENOUS AS NEEDED
Status: DISCONTINUED | OUTPATIENT
Start: 2023-03-08 | End: 2023-03-08 | Stop reason: HOSPADM

## 2023-03-08 RX ORDER — FENTANYL CITRATE 50 UG/ML
25 INJECTION, SOLUTION INTRAMUSCULAR; INTRAVENOUS
Status: DISCONTINUED | OUTPATIENT
Start: 2023-03-08 | End: 2023-03-08 | Stop reason: HOSPADM

## 2023-03-08 RX ORDER — SODIUM CHLORIDE, SODIUM LACTATE, POTASSIUM CHLORIDE, CALCIUM CHLORIDE 600; 310; 30; 20 MG/100ML; MG/100ML; MG/100ML; MG/100ML
100 INJECTION, SOLUTION INTRAVENOUS CONTINUOUS
Status: DISCONTINUED | OUTPATIENT
Start: 2023-03-08 | End: 2023-03-08 | Stop reason: HOSPADM

## 2023-03-08 RX ORDER — LIDOCAINE HYDROCHLORIDE 10 MG/ML
INJECTION, SOLUTION INFILTRATION; PERINEURAL AS NEEDED
Status: DISCONTINUED | OUTPATIENT
Start: 2023-03-08 | End: 2023-03-08 | Stop reason: HOSPADM

## 2023-03-08 RX ORDER — LIDOCAINE HYDROCHLORIDE 10 MG/ML
0.5 INJECTION, SOLUTION EPIDURAL; INFILTRATION; INTRACAUDAL; PERINEURAL ONCE AS NEEDED
Status: DISCONTINUED | OUTPATIENT
Start: 2023-03-08 | End: 2023-03-08 | Stop reason: HOSPADM

## 2023-03-08 RX ORDER — PROPOFOL 10 MG/ML
INJECTION, EMULSION INTRAVENOUS AS NEEDED
Status: DISCONTINUED | OUTPATIENT
Start: 2023-03-08 | End: 2023-03-08 | Stop reason: SURG

## 2023-03-08 RX ORDER — NALOXONE HCL 0.4 MG/ML
0.4 VIAL (ML) INJECTION AS NEEDED
Status: DISCONTINUED | OUTPATIENT
Start: 2023-03-08 | End: 2023-03-08 | Stop reason: HOSPADM

## 2023-03-08 RX ORDER — ACETAMINOPHEN 500 MG
1000 TABLET ORAL ONCE
Status: COMPLETED | OUTPATIENT
Start: 2023-03-08 | End: 2023-03-08

## 2023-03-08 RX ORDER — SODIUM CHLORIDE 0.9 % (FLUSH) 0.9 %
3 SYRINGE (ML) INJECTION EVERY 12 HOURS SCHEDULED
Status: DISCONTINUED | OUTPATIENT
Start: 2023-03-08 | End: 2023-03-08 | Stop reason: HOSPADM

## 2023-03-08 RX ORDER — SODIUM CHLORIDE 0.9 % (FLUSH) 0.9 %
3-10 SYRINGE (ML) INJECTION AS NEEDED
Status: DISCONTINUED | OUTPATIENT
Start: 2023-03-08 | End: 2023-03-08 | Stop reason: HOSPADM

## 2023-03-08 RX ORDER — FLUMAZENIL 0.1 MG/ML
0.2 INJECTION INTRAVENOUS AS NEEDED
Status: DISCONTINUED | OUTPATIENT
Start: 2023-03-08 | End: 2023-03-08 | Stop reason: HOSPADM

## 2023-03-08 RX ORDER — SODIUM CHLORIDE 0.9 % (FLUSH) 0.9 %
10 SYRINGE (ML) INJECTION EVERY 12 HOURS SCHEDULED
Status: DISCONTINUED | OUTPATIENT
Start: 2023-03-08 | End: 2023-03-08 | Stop reason: HOSPADM

## 2023-03-08 RX ADMIN — PROPOFOL 70 MG: 10 INJECTION, EMULSION INTRAVENOUS at 10:49

## 2023-03-08 RX ADMIN — ACETAMINOPHEN 1000 MG: 500 TABLET, FILM COATED ORAL at 10:25

## 2023-03-08 RX ADMIN — PROPOFOL 150 MCG/KG/MIN: 10 INJECTION, EMULSION INTRAVENOUS at 10:50

## 2023-03-08 RX ADMIN — SODIUM CHLORIDE, POTASSIUM CHLORIDE, SODIUM LACTATE AND CALCIUM CHLORIDE 100 ML/HR: 600; 310; 30; 20 INJECTION, SOLUTION INTRAVENOUS at 10:10

## 2023-03-08 NOTE — OP NOTE
Patient Name: Roberto  : 1950  MRN: [id number]    DATE of SURGERY: 3/8/2023    SURGEON: Reed Crowell MD    ASSISTANT: NONE    PREOPERATIVE DIAGNOSES:   1.  Right ring finger A1 stenosing tenosynovitis/trigger finger      POSTOPERATIVE DIAGNOSES:   1.  Right ring finger A1 stenosing tenosynovitis/trigger finger     PROCEDURE PERFORMED:   1.  Right ring finger A1 pulley release (trigger release)       IMPLANTS  None.      ANESTHESIA    Monitored anesthesia care with local anesthetic      OPERATIVE INDICATIONS    This patient is 72 y.o. female who presented to my clinic with complaints of triggering of the digit listed above.  Patient failed conservative management and wished to proceed with surgery and understood the risks, benefits, and alternatives.  I did discuss the risk of damaging neurovascular bundles, recurrence, need for additional procedures, stiffness, painful scar formation, infection, anesthetic complications.        ESTIMATED BLOOD LOSS    Less than 10 mL.      SPECIMENS  None.      DRAINS  None.      COMPLICATIONS    None.      PROCEDURE IN DETAIL  The patient was seen in the preoperative holding room where the correct patient, procedure and side were confirmed.  The operative site was marked by myself with the patient's agreement.  The consent was signed by myself. The patient was transported to the operating room, where a timeout was performed, identifying the correct patient, procedure and operative site.  Dose appropriate antibiotics were given as perioperative antibiotics.  A nonsterile tourniquet was applied to the operative brachium.  The operative extremity was prepped and draped in a normal sterile fashion.    A skin marker was used to delineate an approximately 1 cm longitudinal incision overlying the proposed location of the A1 pulley.  Prior to inflation of the tourniquet, approximately 3 cc of 1% lidocaine with epinephrine were injected in line with the incision.  The operative  extremity was then exsanguinated with an Esmarch and the tourniquet was inflated to 250 mmHg for less than 10 minutes.  A 15 blade scalpel was used to incise only through the skin.  Blunt dissection was carried through the subcutaneous tissues in line with the flexor tendon sheath of the operative finger.  Blunt retractors were placed to protect the digital neurovascular bundles.  The A1 pulley was identified.  There was a cystic structure overlying the A1 pulley which was sharply excised.  The A1 pulley was then sharply incised with a 15 blade scalpel, it was noted to be slightly thickened.  A complete A1 pulley release was performed utilizing deep knife and blunt tipped scissor dissection with care taken to protect the proximal portion of the A2 pulley distally.  Blunt tipped scissors were utilized to break up proximal fascial adhesions.  Underlying FDS and FDP tendons were intact without significant fraying or hypertrophic tenosynovium.  No appreciable soft tissue masses were noted.  Intrasubstance nodular swelling was noted in both the FDS and FDP tendons.  After release, gentle flexion and extension of the operative finger was performed with no residual catching or locking.  The wound was irrigated with normal saline.  Skin edges were reapproximated with nylon sutures.  A soft dressing was applied to the operative hand.  The tourniquet was deflated and adequate capillary refill was retained to all digits.     The patient was awakened from anesthesia and transported to the recovery room in stable condition.      POSTOPERATIVE PLAN    1.  Discharged home with family.     2.  Follow up in 2 weeks for a clinical check

## 2023-03-08 NOTE — ANESTHESIA POSTPROCEDURE EVALUATION
Patient: Gayatri R Block    Procedure Summary     Date: 03/08/23 Room / Location:  [facility] OR 08 /  [facility] OR    Anesthesia Start: 1046 Anesthesia Stop: 1122    Procedure: RIGHT RING FINGER TRIGGER FINGER RELEASE (Right: Fingers) Diagnosis: (M65.341)    Surgeons: Reed Crowell MD Provider: Sterling Ba CRNA    Anesthesia Type: MAC ASA Status: 2          Anesthesia Type: MAC    Vitals  Vitals Value Taken Time   /66 03/08/23 1201   Temp     Pulse 77 03/08/23 1202   Resp 16 03/08/23 1119   SpO2 98 % 03/08/23 1202   Vitals shown include unvalidated device data.        Post Anesthesia Care and Evaluation    Patient location during evaluation: PACU  Patient participation: complete - patient participated  Level of consciousness: awake and alert  Pain management: adequate    Airway patency: patent  Anesthetic complications: No anesthetic complications    Cardiovascular status: acceptable  Respiratory status: acceptable  Hydration status: acceptable    Comments: Blood pressure 119/66, pulse 64, temperature 97.5 °F (36.4 °C), temperature source Temporal, resp. rate 16, SpO2 99 %, not currently breastfeeding.    Pt discharged from PACU based on jodi score >8

## 2023-03-08 NOTE — ANESTHESIA PREPROCEDURE EVALUATION
Anesthesia Evaluation     Patient summary reviewed   history of anesthetic complications: PONV  NPO Solid Status: > 6 hours             Airway   Mallampati: II  Dental      Pulmonary    (-) not a smoker  Cardiovascular   Exercise tolerance: excellent (>7 METS)    (+) hypertension, hyperlipidemia,       Neuro/Psych  (-) seizures, CVA  GI/Hepatic/Renal/Endo    (-) no renal disease, diabetes    Musculoskeletal     Abdominal    Substance History      OB/GYN          Other   arthritis,                      Anesthesia Plan    ASA 2     MAC     intravenous induction     Anesthetic plan, risks, benefits, and alternatives have been provided, discussed and informed consent has been obtained with: patient.        CODE STATUS:

## 2023-03-10 NOTE — TELEPHONE ENCOUNTER
PT called wanting to know if this has been done. She is wanting to get this done. no fever/no chills

## 2023-03-16 ENCOUNTER — TRANSCRIBE ORDERS (OUTPATIENT)
Dept: ADMINISTRATIVE | Facility: HOSPITAL | Age: 73
End: 2023-03-16
Payer: MEDICARE

## 2023-03-16 DIAGNOSIS — I10 PRIMARY HYPERTENSION: Primary | ICD-10-CM

## 2023-04-13 ENCOUNTER — HOSPITAL ENCOUNTER (OUTPATIENT)
Dept: CT IMAGING | Facility: HOSPITAL | Age: 73
Discharge: HOME | End: 2023-04-13
Admitting: NURSE PRACTITIONER

## 2023-04-13 DIAGNOSIS — I10 PRIMARY HYPERTENSION: ICD-10-CM

## 2023-04-13 PROCEDURE — 75571 CT HRT W/O DYE W/CA TEST: CPT | Performed by: INTERNAL MEDICINE

## 2023-04-13 PROCEDURE — 75571 CT HRT W/O DYE W/CA TEST: CPT

## 2023-04-17 ENCOUNTER — OFFICE VISIT (OUTPATIENT)
Dept: GASTROENTEROLOGY | Age: 73
End: 2023-04-17

## 2023-04-17 VITALS
HEIGHT: 62 IN | OXYGEN SATURATION: 95 % | SYSTOLIC BLOOD PRESSURE: 139 MMHG | WEIGHT: 140 LBS | BODY MASS INDEX: 25.76 KG/M2 | HEART RATE: 80 BPM | DIASTOLIC BLOOD PRESSURE: 80 MMHG

## 2023-04-17 DIAGNOSIS — R10.9 ABDOMINAL CRAMPING: ICD-10-CM

## 2023-04-17 DIAGNOSIS — R15.2 INCONTINENCE OF FECES WITH FECAL URGENCY: ICD-10-CM

## 2023-04-17 DIAGNOSIS — R14.3 FLATULENCE: ICD-10-CM

## 2023-04-17 DIAGNOSIS — R15.9 INCONTINENCE OF FECES WITH FECAL URGENCY: ICD-10-CM

## 2023-04-17 DIAGNOSIS — R19.7 DIARRHEA, UNSPECIFIED TYPE: Primary | ICD-10-CM

## 2023-04-17 ASSESSMENT — ENCOUNTER SYMPTOMS
CONSTIPATION: 0
NAUSEA: 0
ANAL BLEEDING: 0
CHOKING: 0
BLOOD IN STOOL: 0
VOMITING: 0
TROUBLE SWALLOWING: 0
ABDOMINAL DISTENTION: 0
RECTAL PAIN: 0
DIARRHEA: 1
ABDOMINAL PAIN: 1
COUGH: 0
SHORTNESS OF BREATH: 0

## 2023-04-17 NOTE — PROGRESS NOTES
Subjective:     Patient ID: Byron Coleman is a 81 y.o. female  PCP: Dino Stockton MD  Referring Provider: No ref. provider found    HPI  Patient presents to the office today with the following complaints: Follow-up      Pt seen today for \"stomach issues. \"  She states she will have days with several BMs, or days without any BM. Pt was seen 1/2022 for similar symptoms.  (+) E coli. Stools are described as \"runny\" with watery at times. Denies any blood in stool. She is following high fiber diet and taking daily Probiotics. This has been going on for a couple of months. She states there are times \"I don't even know I am going. \"  She admits to rare nocturnal diarrhea. Worse in the am.  She c/o \"lots of gas. \"  She also c/o bloating and abdominal cramping. TIF surgery   Last EGD 1/2023 - gastritis per pt (Dr. Ines Wolf)   Last Colonoscopy 12/2021 - Sub prep fair, (-)Micro Colitis, Mod to severe diverticulosis, Int hemorrhoids Gr 1 wo bleeding, 3 year recall  Denies any family hx colon cancer or colon polyps     Assessment:     1. Diarrhea, unspecified type    2. Abdominal cramping    3. Flatulence    4. Incontinence of feces with fecal urgency            Plan:   - Check stools for GI panel  - Pending results, consider trial of Xifaxan   - Continue Probiotics and High Fiber diet  - Call with any questions or concerns       Orders  Orders Placed This Encounter   Procedures    Gastrointestinal Panel, Molecular     Medications  No orders of the defined types were placed in this encounter.         Patient History:     Past Medical History:   Diagnosis Date    Arthritis     osteoarthritis    Colon polyps     FHx: migraine headaches     Heart disease     leaky valve    Hyperlipidemia     Joint pain     Lipids abnormal        Past Surgical History:   Procedure Laterality Date    CHOLECYSTECTOMY      COLONOSCOPY  06/2011    Dr. Zack Awad    COLONOSCOPY N/A 11/15/2016    Dr Janes Rubin yr recall

## 2023-04-18 ENCOUNTER — TELEPHONE (OUTPATIENT)
Dept: GASTROENTEROLOGY | Age: 73
End: 2023-04-18

## 2023-04-18 DIAGNOSIS — K58.0 IRRITABLE BOWEL SYNDROME WITH DIARRHEA: Primary | ICD-10-CM

## 2023-04-18 LAB
ADV 40+41 DNA STL QL NAA+NON-PROBE: NOT DETECTED
C CAYETANENSIS DNA STL QL NAA+NON-PROBE: NOT DETECTED
C COLI+JEJ+UPSA DNA STL QL NAA+NON-PROBE: NOT DETECTED
C DIF TOX TCDA+TCDB STL QL NAA+NON-PROBE: NOT DETECTED
CRYPTOSP DNA STL QL NAA+NON-PROBE: NOT DETECTED
E HISTOLYT DNA STL QL NAA+NON-PROBE: NOT DETECTED
EAEC PAA PLAS AGGR+AATA ST NAA+NON-PRB: NOT DETECTED
EC STX1+STX2 GENES STL QL NAA+NON-PROBE: NOT DETECTED
EPEC EAE GENE STL QL NAA+NON-PROBE: NOT DETECTED
ETEC LTA+ST1A+ST1B TOX ST NAA+NON-PROBE: NOT DETECTED
G LAMBLIA DNA STL QL NAA+NON-PROBE: NOT DETECTED
GI PATH DNA+RNA PNL STL NAA+NON-PROBE: NOT DETECTED
NOROVIRUS GI+II RNA STL QL NAA+NON-PROBE: NOT DETECTED
P SHIGELLOIDES DNA STL QL NAA+NON-PROBE: NOT DETECTED
RVA RNA STL QL NAA+NON-PROBE: NOT DETECTED
S ENT+BONG DNA STL QL NAA+NON-PROBE: NOT DETECTED
SAPO I+II+IV+V RNA STL QL NAA+NON-PROBE: NOT DETECTED
SHIGELLA SP+EIEC IPAH ST NAA+NON-PROBE: NOT DETECTED
V CHOL+PARA+VUL DNA STL QL NAA+NON-PROBE: NOT DETECTED
V CHOLERAE DNA STL QL NAA+NON-PROBE: NOT DETECTED
Y ENTEROCOL DNA STL QL NAA+NON-PROBE: NOT DETECTED

## 2023-04-18 NOTE — TELEPHONE ENCOUNTER
----- Message from ASHLEY Thomas - NP sent at 4/18/2023  3:59 PM CDT -----  Negative GI panel.   Recommend 14 day course of Xifaxan - sent to pharmacy     Called LVM in regards to results and recommendations   Also sent my chart message to the patient

## 2023-04-20 NOTE — TELEPHONE ENCOUNTER
04-20-23 Called [facility]   Spoke with Shira Campbell she stated that the patient picked up her medication last night

## 2023-04-21 ENCOUNTER — TELEPHONE (OUTPATIENT)
Dept: CARDIOLOGY CLINIC | Age: 73
End: 2023-04-21

## 2023-04-21 ENCOUNTER — TELEPHONE (OUTPATIENT)
Dept: GASTROENTEROLOGY | Age: 73
End: 2023-04-21

## 2023-04-21 NOTE — TELEPHONE ENCOUNTER
[address],    Patient left  today stating that she has been taking the Xifaxan a few days now. Took a while for her to get it. Her epigastric pain that she's had a while is worse. She was reading about her BP med, valsartan, and it said can cause diarrhea, abd pain, and pancreas issues. She would like to know if that could be the reason for her issues?  Thanks

## 2023-04-25 RX ORDER — OLMESARTAN MEDOXOMIL 20 MG/1
20 TABLET ORAL DAILY
Qty: 30 TABLET | Refills: 3 | Status: SHIPPED | OUTPATIENT
Start: 2023-04-25

## 2023-04-25 NOTE — TELEPHONE ENCOUNTER
Arun Lyles,  She could try switching to Benicar 20 mg daily and stop the Diovan. Benicar in same class with little to no side effects.   Thanks, [employer]

## 2023-05-11 ENCOUNTER — OFFICE VISIT (OUTPATIENT)
Dept: CARDIOLOGY CLINIC | Age: 73
End: 2023-05-11
Payer: MEDICARE

## 2023-05-11 VITALS
HEIGHT: 62 IN | SYSTOLIC BLOOD PRESSURE: 140 MMHG | BODY MASS INDEX: 26.68 KG/M2 | DIASTOLIC BLOOD PRESSURE: 90 MMHG | WEIGHT: 145 LBS | HEART RATE: 76 BPM

## 2023-05-11 DIAGNOSIS — I10 ESSENTIAL HYPERTENSION: Primary | ICD-10-CM

## 2023-05-11 DIAGNOSIS — E78.2 MIXED HYPERLIPIDEMIA: ICD-10-CM

## 2023-05-11 DIAGNOSIS — I51.89 GRADE I DIASTOLIC DYSFUNCTION: ICD-10-CM

## 2023-05-11 PROCEDURE — 93000 ELECTROCARDIOGRAM COMPLETE: CPT | Performed by: NURSE PRACTITIONER

## 2023-05-11 PROCEDURE — 1123F ACP DISCUSS/DSCN MKR DOCD: CPT | Performed by: NURSE PRACTITIONER

## 2023-05-11 PROCEDURE — 3077F SYST BP >= 140 MM HG: CPT | Performed by: NURSE PRACTITIONER

## 2023-05-11 PROCEDURE — 99214 OFFICE O/P EST MOD 30 MIN: CPT | Performed by: NURSE PRACTITIONER

## 2023-05-11 PROCEDURE — 3080F DIAST BP >= 90 MM HG: CPT | Performed by: NURSE PRACTITIONER

## 2023-05-11 NOTE — PROGRESS NOTES
valve. Mild aortic regurgitation. No stenosis is  noted. No evidence of significant pericardial effusion is noted. Aortic root and ascending aorta are within normal limits. The rhythm is sinus. Normal hemodynamic parameters; SVI 55 ml/m2, CI 3.13 L/min/m2, SVR 1,426  dynes/sec/cm-5. Electronically signed by Mecca Urias(Interpreting physician)  on 03/18/2022 01:54 PM    EKG today reviewed: NSR 76 bpm; RBBB; no acute ischemic changes or ectopy. BP Readings from Last 3 Encounters:   05/11/23 (!) 140/90   04/17/23 139/80   11/11/22 128/70    Pulse Readings from Last 3 Encounters:   05/11/23 76   04/17/23 80   11/11/22 97        Wt Readings from Last 3 Encounters:   05/11/23 145 lb (65.8 kg)   04/17/23 140 lb (63.5 kg)   11/11/22 143 lb (64.9 kg)     Assessment/Plan:   Diagnosis Orders   1. Essential hypertension  EKG 12 lead      2. Mixed hyperlipidemia        3. Grade I diastolic dysfunction          Stable CV status without overt heart failure, sensed arrhythmia or angina. HTN - currently fairly good control. Using alternative therapy and weight loss. BP readings 138/74, 138/87, 120/61, 121/80 and 130/78. Hyperlipidemia - monitored by PCP. Not currently on statin. Grade 1 DD - no symptoms of HFpEF. Patient is compliant with medication regimen. Previous cardiac history and records reviewed. Continue current medical management for cardiac related condition. Continue other current medications as directed. Continue to follow up with primary care provider for non cardiac medical problems. If your primary care provider is outside of the [facility], please request that your labs be faxed to this office at [phone]. BP goal 130/80 or less. Call the office with any problems, questions or concerns at [phone]. Cardiology follow up as scheduled in Reg appointments. Educational included in patient instructions. Heart health.       ASHLEY Reed

## 2023-05-11 NOTE — PATIENT INSTRUCTIONS
pressure, or a strange feeling in the chest.  Sweating. Shortness of breath. Pain, pressure, or a strange feeling in the back, neck, jaw, or upper belly or in one or both shoulders or arms. Lightheadedness or sudden weakness. A fast or irregular heartbeat. After you call 911, the  may tell you to chew 1 adult-strength or 2 to 4 low-dose aspirin. Wait for an ambulance. Do not try to drive yourself. Watch closely for changes in your health, and be sure to contact your doctor if you have any problems. Where can you learn more? Go to [email or web address] and sign in to your [employer] account. Enter Q026 in the finalsite box to learn more about \"A Healthy Heart: Care Instructions. \"     If you do not have an account, please click on the \"Sign Up Now\" link. Current as of: December 16, 2019               Content Version: 12.5  © 6587-5180 Healthwise, Incorporated. Care instructions adapted under license by [facility] [place] ([facility]). If you have questions about a medical condition or this instruction, always ask your healthcare professional. [place] [address] any warranty or liability for your use of this information.

## 2023-06-16 ENCOUNTER — TRANSCRIBE ORDERS (OUTPATIENT)
Dept: PHYSICAL THERAPY | Facility: CLINIC | Age: 73
End: 2023-06-16
Payer: MEDICARE

## 2023-06-16 DIAGNOSIS — M79.10 MYALGIA, UNSPECIFIED SITE: Primary | ICD-10-CM

## 2023-07-11 NOTE — TELEPHONE ENCOUNTER
YAYAO report reviewed  Analysis time 6 days and 14 hours from 3/7/2022 to 3/14/2022  Underlying rhythm normal sinus rhythm  Average heart rate 74 bpm  Minimum heart rate 54 bpm at 4:47 AM  Max heart rate 109 bpm  No VT, pauses, second or third-degree heart block, A. fib or SVT. Occasional PAC and rare PVC. No patient triggers. 1 diary entry for sinus rhythm and PACs.
show

## 2023-09-14 ENCOUNTER — OFFICE VISIT (OUTPATIENT)
Dept: GASTROENTEROLOGY | Age: 73
End: 2023-09-14
Payer: MEDICARE

## 2023-09-14 VITALS
HEIGHT: 62 IN | HEART RATE: 82 BPM | SYSTOLIC BLOOD PRESSURE: 135 MMHG | DIASTOLIC BLOOD PRESSURE: 80 MMHG | BODY MASS INDEX: 25.4 KG/M2 | OXYGEN SATURATION: 94 % | WEIGHT: 138 LBS

## 2023-09-14 DIAGNOSIS — K58.0 IRRITABLE BOWEL SYNDROME WITH DIARRHEA: Primary | ICD-10-CM

## 2023-09-14 PROCEDURE — 1123F ACP DISCUSS/DSCN MKR DOCD: CPT | Performed by: NURSE PRACTITIONER

## 2023-09-14 PROCEDURE — 3079F DIAST BP 80-89 MM HG: CPT | Performed by: NURSE PRACTITIONER

## 2023-09-14 PROCEDURE — 3075F SYST BP GE 130 - 139MM HG: CPT | Performed by: NURSE PRACTITIONER

## 2023-09-14 PROCEDURE — 99214 OFFICE O/P EST MOD 30 MIN: CPT | Performed by: NURSE PRACTITIONER

## 2023-09-14 ASSESSMENT — ENCOUNTER SYMPTOMS
CONSTIPATION: 0
RECTAL PAIN: 0
ABDOMINAL PAIN: 1
BLOOD IN STOOL: 0
ANAL BLEEDING: 0
SHORTNESS OF BREATH: 0
DIARRHEA: 1
VOMITING: 0
COUGH: 0
NAUSEA: 0
TROUBLE SWALLOWING: 0
CHOKING: 0
ABDOMINAL DISTENTION: 0

## 2023-09-14 NOTE — PATIENT INSTRUCTIONS
The Low FODMAP Diet (FODMAP=Fermentable Oligo-Di-Monosaccharides and Polyols)     FODMAPs are carbohydrates (sugars) that are found in foods. Not all carbohydrates are considered FODMAPs. The FODMAPs in the diet are:   Fructose (fruits, honey, high fructose corn syrup (HFCS), etc)   Lactose (dairy)   Fructans (wheat, onion, garlic, etc)(fructans are also known as inulin)   Galactans (beans, lentils, legumes such as soy, etc)   Polyols (sweeteners containing sorbitol, mannitol, xylitol, maltitol, stone fruits such as avocado, apricots, cherries, nectarines, peaches, plums, etc)     FODMAPs are osmotic (means they pull water into the intestinal tract) , may not be   digested or absorbed well and could be fermented upon by bacteria in the intestinal   tract when eaten in excess. Symptoms of gas, bloating, cramping and/or diarrhea may occur in those who could be sensitive to the effects of FODMAPs. A low FODMAP diet may help reduce symptoms, which will limit foods high in fructose, lactose, fructans, galactans and polyols. The low FODMAP diet is often used in those with irritable bowel syndrome (18S). The diet also has potential use in those with similar symptoms arising from other digestive disorders such as inflammatory bowel disease. This diet will also limit fiber as some high fiber foods have also high amounts of FODMAPs.  (Fiber is a component of complex carbohydrates that the body cannot digest, found in plant based foods such as beans, fruits, vegetables, whole grains, etc)     Food Group Foods to Eat Foods to limit   Meats, [employer], [employer], chicken, canned tuna, eggs, egg whites, fish, lamb, pork, shellfish, turkey, cold cuts Foods made with high FODMAP fruit sauces or with HFCS   Dairy Lactose-free dairy, small amounts of:  cream cheese, half and half, hard cheeses (cheddar, sai, parmesan, swiss), mozzarella, sherbet Buttermilk, chocolate, cottage cheese, ice cream, creamy/cheesy

## 2023-09-14 NOTE — PROGRESS NOTES
movements intact. Conjunctiva/sclera: Conjunctivae normal.   Cardiovascular:      Rate and Rhythm: Normal rate. Pulmonary:      Effort: Pulmonary effort is normal. No respiratory distress. Abdominal:      General: There is no distension. Musculoskeletal:         General: Normal range of motion. Cervical back: Normal range of motion. Skin:     General: Skin is warm and dry. Neurological:      General: No focal deficit present. Mental Status: She is alert and oriented to person, place, and time.    Psychiatric:         Mood and Affect: Mood normal.         Behavior: Behavior normal.

## 2023-11-13 ENCOUNTER — OFFICE VISIT (OUTPATIENT)
Dept: CARDIOLOGY CLINIC | Age: 73
End: 2023-11-13
Payer: MEDICARE

## 2023-11-13 VITALS
WEIGHT: 138 LBS | HEIGHT: 62 IN | HEART RATE: 72 BPM | OXYGEN SATURATION: 98 % | BODY MASS INDEX: 25.4 KG/M2 | SYSTOLIC BLOOD PRESSURE: 136 MMHG | DIASTOLIC BLOOD PRESSURE: 84 MMHG

## 2023-11-13 DIAGNOSIS — I10 ESSENTIAL HYPERTENSION: Primary | ICD-10-CM

## 2023-11-13 PROCEDURE — 3075F SYST BP GE 130 - 139MM HG: CPT | Performed by: INTERNAL MEDICINE

## 2023-11-13 PROCEDURE — 3079F DIAST BP 80-89 MM HG: CPT | Performed by: INTERNAL MEDICINE

## 2023-11-13 PROCEDURE — 99214 OFFICE O/P EST MOD 30 MIN: CPT | Performed by: INTERNAL MEDICINE

## 2023-11-13 PROCEDURE — 1123F ACP DISCUSS/DSCN MKR DOCD: CPT | Performed by: INTERNAL MEDICINE

## 2023-11-13 ASSESSMENT — ENCOUNTER SYMPTOMS
EYE DISCHARGE: 0
APNEA: 0
DIARRHEA: 0
CHEST TIGHTNESS: 0
NAUSEA: 0
EYE REDNESS: 0
COUGH: 0
VOMITING: 0
EYE PAIN: 0
ABDOMINAL DISTENTION: 0
SORE THROAT: 0
BLOOD IN STOOL: 0
FACIAL SWELLING: 0
SHORTNESS OF BREATH: 0
CONSTIPATION: 0
WHEEZING: 0
ABDOMINAL PAIN: 0

## 2023-11-13 NOTE — PROGRESS NOTES
wheezing. Cardiovascular:  Negative for chest pain, palpitations and leg swelling. Gastrointestinal:  Negative for abdominal distention, abdominal pain, blood in stool, constipation, diarrhea, nausea and vomiting. Endocrine: Negative for polydipsia, polyphagia and polyuria. Genitourinary:  Negative for dysuria, flank pain, frequency and hematuria. Musculoskeletal:  Negative for joint swelling, myalgias and neck pain. Skin:  Negative for pallor and rash. Neurological:  Negative for dizziness, syncope, speech difficulty, light-headedness, numbness and headaches. Psychiatric/Behavioral:  Negative for confusion, hallucinations and sleep disturbance.         Past Medical History:      Diagnosis Date    Arthritis     osteoarthritis    Colon polyps     FHx: migraine headaches     Heart disease     leaky valve    Hyperlipidemia     IBS (irritable bowel syndrome)     Joint pain     Lipids abnormal        Past Surgical History:      Procedure Laterality Date    CHOLECYSTECTOMY      COLONOSCOPY  06/2011    Dr. Mihir Perez N/A 11/15/2016    Dr Neelam Macias yr recall    COLONOSCOPY N/A 12/14/2021    Dr Lin Grief, Sub prep fair, (-)Micro Colitis, Mod to severe diverticulosis, Int hemorrhoids Gr 1 wo bleeding, 3 year recall    DIAPHRAGMATIC HERNIA REPAIR      10/31/2022    HAND SURGERY Right 03/08/2023    Trigger Finger    HYSTERECTOMY (CERVIX STATUS UNKNOWN)      KNEE ARTHROSCOPY Left     KNEE ARTHROSCOPY Right 01/27/2016    KNEE ARTHROSCOPY WITH SUBCHONDROPLASTY  performed by Ash Hilton MD at [address]  10/31/2022    Dr Palmer Diamond incisionless fundoplication    TOTAL KNEE ARTHROPLASTY Right 2017    UPPER GASTROINTESTINAL ENDOSCOPY  01/2023    [facility]- Dr Gabrielle Lobo esophagus per pt       Medications:  Current Outpatient Medications   Medication Sig Dispense Refill    Multiple Vitamins-Minerals (PRESERVISION AREDS PO) Take 1 tablet by mouth

## 2023-11-28 ENCOUNTER — TRANSCRIBE ORDERS (OUTPATIENT)
Dept: ADMINISTRATIVE | Facility: HOSPITAL | Age: 73
End: 2023-11-28
Payer: MEDICARE

## 2023-11-28 DIAGNOSIS — Z12.31 ENCOUNTER FOR SCREENING MAMMOGRAM FOR MALIGNANT NEOPLASM OF BREAST: Primary | ICD-10-CM

## 2023-12-04 LAB
NCCN CRITERIA FLAG: ABNORMAL
TYRER CUZICK SCORE: 12.3

## 2023-12-06 NOTE — PROGRESS NOTES
This patient recently took the CARE risk assessment for a mammogram appointment. Based on the patient's responses, NCCN criteria for genetic testing was met.  The patient had genetic testing in 2021.

## 2023-12-12 ENCOUNTER — OFFICE VISIT (OUTPATIENT)
Dept: GASTROENTEROLOGY | Age: 73
End: 2023-12-12
Payer: MEDICARE

## 2023-12-12 VITALS
BODY MASS INDEX: 25.58 KG/M2 | WEIGHT: 139 LBS | HEIGHT: 62 IN | OXYGEN SATURATION: 98 % | SYSTOLIC BLOOD PRESSURE: 130 MMHG | DIASTOLIC BLOOD PRESSURE: 70 MMHG | HEART RATE: 84 BPM

## 2023-12-12 DIAGNOSIS — E73.9 LACTOSE INTOLERANCE: ICD-10-CM

## 2023-12-12 DIAGNOSIS — K90.41 GLUTEN INTOLERANCE: ICD-10-CM

## 2023-12-12 DIAGNOSIS — K58.0 IRRITABLE BOWEL SYNDROME WITH DIARRHEA: Primary | ICD-10-CM

## 2023-12-12 DIAGNOSIS — K58.2 IRRITABLE BOWEL SYNDROME WITH BOTH CONSTIPATION AND DIARRHEA: ICD-10-CM

## 2023-12-12 PROCEDURE — 1123F ACP DISCUSS/DSCN MKR DOCD: CPT | Performed by: NURSE PRACTITIONER

## 2023-12-12 PROCEDURE — 99214 OFFICE O/P EST MOD 30 MIN: CPT | Performed by: NURSE PRACTITIONER

## 2023-12-12 PROCEDURE — 3078F DIAST BP <80 MM HG: CPT | Performed by: NURSE PRACTITIONER

## 2023-12-12 PROCEDURE — 3074F SYST BP LT 130 MM HG: CPT | Performed by: NURSE PRACTITIONER

## 2023-12-12 NOTE — PROGRESS NOTES
Subjective:     Patient ID: Senthil Alcantara is a 72 y.o. female  PCP: Dwain Leach MD  Referring Provider: No ref. provider found    HPI  Patient presents to the office today with the following complaints: Follow-up      Pt seen today for follow up. Hx IBS with diarrhea. This will alternate with constipation. She is using Lactaid and \"pretty much gluten free. \"  She has tried and failed Metamucil. She will have days without a BM. She is using \"cramp defense\" prn for bouts of constipation. She will have days of diarrhea as well. She is trying to follow low FODMAP diet. She has questions regarding food triggers. Denies any blood in stool. TIF surgery   Last EGD 1/2023 - gastritis per pt (Dr. Aman Núñez)   Last Colonoscopy 12/2021 - Sub prep fair, (-)Micro Colitis, Mod to severe diverticulosis, Int hemorrhoids Gr 1 wo bleeding, 3 year recall  Denies any family hx colon cancer or colon polyps     Assessment:     1. Irritable bowel syndrome with diarrhea    2. Lactose intolerance    3. Irritable bowel syndrome with both constipation and diarrhea    4. Gluten intolerance            Plan:   - Refer to Dietician   - Consider another round of Xifaxan  - Consider follow up with MD  - Call with any questions or concerns     Time spent on visit > 30 minues   Orders  Orders Placed This Encounter   Procedures    External Referral To Nutrition Services     Referral Priority:   Routine     Referral Type:   Eval and Treat     Referral Reason:   Specialty Services Required     Requested Specialty:   Nutrition     Number of Visits Requested:   1     Medications  No orders of the defined types were placed in this encounter.         Patient History:     Past Medical History:   Diagnosis Date    Arthritis     osteoarthritis    Colon polyps     FHx: migraine headaches     Heart disease     leaky valve    Hyperlipidemia     IBS (irritable bowel syndrome)     Joint pain     Lipids abnormal        Past Surgical History:

## 2023-12-13 ENCOUNTER — TELEPHONE (OUTPATIENT)
Dept: GASTROENTEROLOGY | Age: 73
End: 2023-12-13

## 2023-12-13 ASSESSMENT — ENCOUNTER SYMPTOMS
ABDOMINAL PAIN: 0
SHORTNESS OF BREATH: 0
NAUSEA: 0
VOMITING: 0
DIARRHEA: 1
ABDOMINAL DISTENTION: 1
ANAL BLEEDING: 0
CONSTIPATION: 1
BLOOD IN STOOL: 0
CHOKING: 0
TROUBLE SWALLOWING: 0
COUGH: 0
RECTAL PAIN: 0

## 2023-12-13 NOTE — TELEPHONE ENCOUNTER
----- Message from ERICH WOOD [facility] sent at 12/12/2023  2:46 PM CST -----  Regarding: checkout note 12/12/23  Refer to [facility] records to [facility]   Ph [phone] Fax [phone]      Called patient notified that the referral her been sent

## 2023-12-14 ENCOUNTER — HOSPITAL ENCOUNTER (OUTPATIENT)
Dept: MAMMOGRAPHY | Facility: HOSPITAL | Age: 73
Discharge: HOME | End: 2023-12-14
Admitting: FAMILY MEDICINE
Payer: MEDICARE

## 2023-12-14 DIAGNOSIS — Z12.31 ENCOUNTER FOR SCREENING MAMMOGRAM FOR MALIGNANT NEOPLASM OF BREAST: ICD-10-CM

## 2023-12-14 PROCEDURE — 77067 SCR MAMMO BI INCL CAD: CPT

## 2023-12-14 PROCEDURE — 77063 BREAST TOMOSYNTHESIS BI: CPT

## 2023-12-15 ENCOUNTER — TRANSCRIBE ORDERS (OUTPATIENT)
Dept: ADMINISTRATIVE | Facility: HOSPITAL | Age: 73
End: 2023-12-15
Payer: MEDICARE

## 2023-12-15 DIAGNOSIS — K58.0 IRRITABLE BOWEL SYNDROME WITH DIARRHEA: Primary | ICD-10-CM

## 2023-12-15 DIAGNOSIS — E73.9 LACTOSE INTOLERANCE: ICD-10-CM

## 2024-01-04 ENCOUNTER — HOSPITAL ENCOUNTER (OUTPATIENT)
Dept: NUTRITION | Facility: HOSPITAL | Age: 74
Discharge: HOME | End: 2024-01-04
Admitting: NURSE PRACTITIONER
Payer: MEDICARE

## 2024-01-04 PROCEDURE — 97802 MEDICAL NUTRITION INDIV IN: CPT

## 2024-01-04 NOTE — CONSULTS
Adult Outpatient Nutrition  Assessment/PES    Patient Name:  Gayatri Bianchi  YOB: 1950  MRN: [id number]    Assessment Date:  1/4/2024    Pt presented at [facility] Conference Room with spouse present. Pt here for IBS MNT edu. Pt stated she has been experience diarrhea but has subsided the past couple of days. Pt stated she has been following low-FODMAP diet but still consumes lactose and other High-FODMAP foods. Pt stated some of the high-FODMAP foods she consumes does not bother her. Pt stated when she consumes products with lactose, she does use Lactaid which seems to prevent diarrhea from occurring. Although, pt wanted to know exactly how much Lactaid she should consume. Provided education and handouts to pt. Education included diagnosis, fiber, low-FODMAP diet, gluten-free diet, basic nutrition, and food/snack alternatives. Advised pt to try Nutrisource fiber supplement as pt believes she is not consuming enough fiber. Discussed to pt about moderating/eliminating foods that cause irritation. Pt verbalized acceptance and understanding.     Handouts:  Gluten-Free Diet  Low-FODMAP Nutrition Therapy  IBS Nutrition Therapy    Provided contact information to pt at the end of the session.     General Info       Row Name 01/04/24 1109       Today's Session    Person(s) attending today's session Patient;Spouse     Services Used Today? No       General Information    How Well Do You Speak English? very well    Preferred Language English    Are you able to read and write English? Yes    People in Home spouse                   Physical Findings       Row Name 01/04/24 1109          Physical Findings    Overall Physical Appearance Calm and cooperative.                    Retired Nutritional Info/Activity       Row Name 01/04/24 1102          Eating Environment    Eating environment Family        Physical Activity    Are you currently involved in an activity/exercise program?  Yes                     Home Nutrition Report       Row Name 01/04/24 1109          Home Nutrition Report    Diet Self modified     Typical Intake (Food/Fluid/EN/PN) Pt presented at [facility] Conference Room with spouse present. Pt here for IBS MNT edu. Pt stated she has been following low-FODMAP diet but still consumes lactose and other High-FODMAP foods. Pt stated some of the high-FODMAP foods she consumes does not bother her and when she consumes products with lactose, she does use Lactaid which seems to prevent diarrhea from occurring. Although, pt wanted to know exactly how much Lactaid she should consume. Provided education and handouts to pt. Education included diagnosis, fiber, low-FODMAP diet, gluten-free diet, basic nutrition, and food/snack alternatives. Advised pt to try Nutrisource fiber supplement as pt believes she is not consuming enough fiber. Discussed to pt about moderating/eliminating foods that cause irritation. Pt verbalized acceptance and understanding.     Vitamin/Mineral Supplements Probiotics                           Problem/Interventions:   Problem 1       Row Name 01/04/24 1110          Nutrition Diagnoses Problem 1    Problem 1 Knowledge Deficit     Etiology (related to) MNT for Treatment/Condition;Medical Diagnosis     Gastrointestinal IBS     Signs/Symptoms (evidenced by) Potential Information Deficit                            Intervention Goal       Row Name 01/04/24 1111          Intervention Goal    General Provide information regarding MNT for treatment/condition                        Education/Evaluation       Row Name 01/04/24 1112          Education    Education Advised regarding habits/behavior;Education topics;Provided education regarding     Provided education regarding Avoidance/improvement of symptoms     Education Topics Fiber     Advised Regarding Habits/Behavior Use supplement        Monitor/Evaluation    Monitor --                     Electronically signed by:  Marlon Moses  RD  01/04/24 11:13 CST

## 2024-03-15 ENCOUNTER — TELEPHONE (OUTPATIENT)
Dept: GASTROENTEROLOGY | Age: 74
End: 2024-03-15

## 2024-03-15 NOTE — TELEPHONE ENCOUNTER
Lets monitor and if the pain persists or worsens call us back, may add miralax daily if her bowels are not moving daily

## 2024-03-15 NOTE — TELEPHONE ENCOUNTER
3-14-24       Luz MARTINEZ [facility] Staff (supporting Denita Perez APRN - NP)22 hours ago (11:01 AM)       I continue to have some issues with IBS. While overall I'm better I've noticed that lately I've been awakened in the early morning with left lower quadrant belly pain. I got a diagnosis of diverticulitis at my last colonoscopy so this may be related. Seems to be worse when I'm either constipated or having issues. I started on the two probiotics recommended by the dietitian and this is when I started getting better. I can now eat sourdough bread and most of the foods on the list without aggravating my bowel issues as long as I don't go crazy. Lactose is still a problem. But as long as I take my lactaid I'm OK. Luz Escobar,  are you just letting us know this or what is you question?  Please advise, thanks.  Meghan Wei MA            ----- Message from Luz Humphries sent at 3/14/2024  7:05 PM CDT -----  Regarding: IBS  Contact: [phone]  I just want to know if I need to do anything else. And if there is anything I need to do about the morning left lower quadrant abdominal pain or is this normal as my gut heals. Luz Humphries

## (undated) DEVICE — INTENDED FOR TISSUE SEPARATION, AND OTHER PROCEDURES THAT REQUIRE A SHARP SURGICAL BLADE TO PUNCTURE OR CUT.: Brand: BARD-PARKER ® STAINLESS STEEL BLADES

## (undated) DEVICE — SPNG GZ STRL 2S 4X4 12PLY

## (undated) DEVICE — CABL BIPOL MEGADYNE 12FT DISP

## (undated) DEVICE — BAPTIST TURNOVER KIT: Brand: MEDLINE INDUSTRIES, INC.

## (undated) DEVICE — PAD UNDERCAST WYTEX 3IN 4YD LF STRL

## (undated) DEVICE — DISPOSABLE TOURNIQUET CUFF SINGLE BLADDER, SINGLE PORT AND QUICK CONNECT CONNECTOR: Brand: COLOR CUFF

## (undated) DEVICE — BNDG ELAS ECON W/CLIP 3IN 5YD LF STRL

## (undated) DEVICE — 4-PORT MANIFOLD: Brand: NEPTUNE 2

## (undated) DEVICE — DRSNG GZ CURAD XEROFORM NONADHS 5X9IN STRL

## (undated) DEVICE — SUT ETHLN 4/0 FS2 18IN 662H

## (undated) DEVICE — BNDG ESMARK 4IN 9FT LF STRL BLU

## (undated) DEVICE — GLV SURG TRIUMPH PF LTX 7.5 STRL

## (undated) DEVICE — PK EXTRM 30

## (undated) DEVICE — CVR BRD ARM 13X30

## (undated) DEVICE — GLV SURG DERMASSURE GRN LF PF 8.0

## (undated) DEVICE — ENDO KIT,LOURDES HOSPITAL: Brand: MEDLINE INDUSTRIES, INC.

## (undated) DEVICE — SYR LL TP 10ML STRL